# Patient Record
Sex: FEMALE | Race: BLACK OR AFRICAN AMERICAN | Employment: PART TIME | URBAN - METROPOLITAN AREA
[De-identification: names, ages, dates, MRNs, and addresses within clinical notes are randomized per-mention and may not be internally consistent; named-entity substitution may affect disease eponyms.]

---

## 2017-01-11 ENCOUNTER — HOSPITAL ENCOUNTER (OUTPATIENT)
Dept: SLEEP MEDICINE | Age: 62
Discharge: HOME | End: 2017-01-11
Payer: MEDICARE

## 2017-01-11 PROCEDURE — 95810 POLYSOM 6/> YRS 4/> PARAM: CPT

## 2017-05-15 ENCOUNTER — HOSPITAL ENCOUNTER (OUTPATIENT)
Dept: LAB | Age: 62
Discharge: HOME | End: 2017-05-15
Attending: INTERNAL MEDICINE
Payer: MEDICARE

## 2017-05-15 DIAGNOSIS — I10 ESSENTIAL HYPERTENSION: ICD-10-CM

## 2017-05-15 DIAGNOSIS — E78.5 DYSLIPIDEMIA: ICD-10-CM

## 2017-05-15 LAB
ALBUMIN SERPL BCP-MCNC: 3.4 G/DL (ref 3.5–5)
ALBUMIN/GLOB SERPL: 0.9 {RATIO}
ALP SERPL-CCNC: 98 U/L (ref 50–136)
ALT SERPL-CCNC: 20 U/L (ref 12–65)
ANION GAP BLD CALC-SCNC: 5 MMOL/L
AST SERPL W P-5'-P-CCNC: 18 U/L (ref 15–37)
BILIRUB DIRECT SERPL-MCNC: 0.1 MG/DL
BILIRUB SERPL-MCNC: 0.4 MG/DL (ref 0.2–1.1)
BUN SERPL-MCNC: 13 MG/DL (ref 6–23)
CALCIUM SERPL-MCNC: 8.8 MG/DL (ref 8.3–10.4)
CHLORIDE SERPL-SCNC: 103 MMOL/L (ref 98–107)
CHOLEST SERPL-MCNC: 190 MG/DL
CO2 SERPL-SCNC: 34 MMOL/L (ref 21–32)
CREAT SERPL-MCNC: 1 MG/DL (ref 0.6–1)
GLOBULIN SER CALC-MCNC: 3.9 G/DL
GLUCOSE SERPL-MCNC: 113 MG/DL (ref 65–100)
HDLC SERPL-MCNC: 71 MG/DL (ref 40–60)
HDLC SERPL: 2.7 {RATIO}
LDLC SERPL CALC-MCNC: 97 MG/DL
LIPID PROFILE,FLP: ABNORMAL
POTASSIUM SERPL-SCNC: 3.2 MMOL/L (ref 3.5–5.1)
PROT SERPL-MCNC: 7.3 G/DL (ref 6.3–8.2)
SODIUM SERPL-SCNC: 142 MMOL/L (ref 136–145)
TRIGL SERPL-MCNC: 110 MG/DL (ref 35–150)
VLDLC SERPL CALC-MCNC: 22 MG/DL (ref 6–23)

## 2017-05-15 PROCEDURE — 80076 HEPATIC FUNCTION PANEL: CPT | Performed by: INTERNAL MEDICINE

## 2017-05-15 PROCEDURE — 36415 COLL VENOUS BLD VENIPUNCTURE: CPT | Performed by: INTERNAL MEDICINE

## 2017-05-15 PROCEDURE — 80048 BASIC METABOLIC PNL TOTAL CA: CPT | Performed by: INTERNAL MEDICINE

## 2017-05-15 PROCEDURE — 80061 LIPID PANEL: CPT | Performed by: INTERNAL MEDICINE

## 2017-10-31 PROBLEM — E11.9 TYPE 2 DIABETES MELLITUS (HCC): Status: ACTIVE | Noted: 2017-10-31

## 2017-10-31 PROBLEM — F34.1 DYSTHYMIA: Status: ACTIVE | Noted: 2017-10-31

## 2017-11-27 PROBLEM — R06.02 SHORTNESS OF BREATH: Status: ACTIVE | Noted: 2017-11-27

## 2018-01-19 PROBLEM — E66.01 OBESITY, MORBID (HCC): Status: ACTIVE | Noted: 2018-01-19

## 2018-01-19 PROBLEM — R06.02 SHORTNESS OF BREATH: Status: RESOLVED | Noted: 2017-11-27 | Resolved: 2018-01-19

## 2018-02-13 ENCOUNTER — ANESTHESIA EVENT (OUTPATIENT)
Dept: ENDOSCOPY | Age: 63
End: 2018-02-13
Payer: MEDICARE

## 2018-02-13 RX ORDER — SODIUM CHLORIDE 0.9 % (FLUSH) 0.9 %
5-10 SYRINGE (ML) INJECTION AS NEEDED
Status: CANCELLED | OUTPATIENT
Start: 2018-02-13

## 2018-02-13 RX ORDER — SODIUM CHLORIDE, SODIUM LACTATE, POTASSIUM CHLORIDE, CALCIUM CHLORIDE 600; 310; 30; 20 MG/100ML; MG/100ML; MG/100ML; MG/100ML
100 INJECTION, SOLUTION INTRAVENOUS CONTINUOUS
Status: CANCELLED | OUTPATIENT
Start: 2018-02-13

## 2018-02-14 ENCOUNTER — ANESTHESIA (OUTPATIENT)
Dept: ENDOSCOPY | Age: 63
End: 2018-02-14
Payer: MEDICARE

## 2018-02-14 ENCOUNTER — HOSPITAL ENCOUNTER (OUTPATIENT)
Age: 63
Setting detail: OUTPATIENT SURGERY
Discharge: HOME | End: 2018-02-14
Attending: SURGERY | Admitting: SURGERY
Payer: MEDICARE

## 2018-02-14 VITALS
WEIGHT: 200 LBS | DIASTOLIC BLOOD PRESSURE: 74 MMHG | RESPIRATION RATE: 16 BRPM | TEMPERATURE: 96.8 F | SYSTOLIC BLOOD PRESSURE: 127 MMHG | BODY MASS INDEX: 34.15 KG/M2 | OXYGEN SATURATION: 100 % | HEART RATE: 63 BPM | HEIGHT: 64 IN

## 2018-02-14 LAB — GLUCOSE BLD STRIP.AUTO-MCNC: 97 MG/DL (ref 65–100)

## 2018-02-14 PROCEDURE — 76040000025: Performed by: SURGERY

## 2018-02-14 PROCEDURE — 74011250636 HC RX REV CODE- 250/636: Performed by: ANESTHESIOLOGY

## 2018-02-14 PROCEDURE — 74011250636 HC RX REV CODE- 250/636

## 2018-02-14 PROCEDURE — 74011250637 HC RX REV CODE- 250/637: Performed by: ANESTHESIOLOGY

## 2018-02-14 PROCEDURE — 82962 GLUCOSE BLOOD TEST: CPT

## 2018-02-14 PROCEDURE — 76060000032 HC ANESTHESIA 0.5 TO 1 HR: Performed by: SURGERY

## 2018-02-14 RX ORDER — FAMOTIDINE 20 MG/1
20 TABLET, FILM COATED ORAL AS NEEDED
Status: COMPLETED | OUTPATIENT
Start: 2018-02-14 | End: 2018-02-14

## 2018-02-14 RX ORDER — PROPOFOL 10 MG/ML
INJECTION, EMULSION INTRAVENOUS AS NEEDED
Status: DISCONTINUED | OUTPATIENT
Start: 2018-02-14 | End: 2018-02-14 | Stop reason: HOSPADM

## 2018-02-14 RX ORDER — PROPOFOL 10 MG/ML
INJECTION, EMULSION INTRAVENOUS
Status: DISCONTINUED | OUTPATIENT
Start: 2018-02-14 | End: 2018-02-14 | Stop reason: HOSPADM

## 2018-02-14 RX ORDER — DEXTROMETHORPHAN/PSEUDOEPHED 2.5-7.5/.8
40 DROPS ORAL
Status: DISCONTINUED | OUTPATIENT
Start: 2018-02-14 | End: 2018-02-14 | Stop reason: HOSPADM

## 2018-02-14 RX ORDER — SODIUM CHLORIDE, SODIUM LACTATE, POTASSIUM CHLORIDE, CALCIUM CHLORIDE 600; 310; 30; 20 MG/100ML; MG/100ML; MG/100ML; MG/100ML
100 INJECTION, SOLUTION INTRAVENOUS CONTINUOUS
Status: DISCONTINUED | OUTPATIENT
Start: 2018-02-14 | End: 2018-02-14 | Stop reason: HOSPADM

## 2018-02-14 RX ADMIN — SODIUM CHLORIDE, SODIUM LACTATE, POTASSIUM CHLORIDE, AND CALCIUM CHLORIDE 100 ML/HR: 600; 310; 30; 20 INJECTION, SOLUTION INTRAVENOUS at 07:45

## 2018-02-14 RX ADMIN — PROPOFOL 30 MG: 10 INJECTION, EMULSION INTRAVENOUS at 08:08

## 2018-02-14 RX ADMIN — FAMOTIDINE 20 MG: 20 TABLET, FILM COATED ORAL at 07:44

## 2018-02-14 RX ADMIN — PROPOFOL 300 MCG/KG/MIN: 10 INJECTION, EMULSION INTRAVENOUS at 08:08

## 2018-02-14 NOTE — ANESTHESIA POSTPROCEDURE EVALUATION
Post-Anesthesia Evaluation and Assessment    Patient: Alecia Gillespie MRN: [id number]  SSN: [id number]    YOB: 1955  Age: 63 y.o. Sex: female       Cardiovascular Function/Vital Signs  Visit Vitals    /62    Pulse 66    Temp 36 °C (96.8 °F)    Resp 16    Ht 5' 4\" (1.626 m)    Wt 90.7 kg (200 lb)    SpO2 98%    BMI 34.33 kg/m2       Patient is status post total IV anesthesia anesthesia for Procedure(s):  COLONOSCOPY/ 41. Nausea/Vomiting: None    Postoperative hydration reviewed and adequate. Pain:  Pain Scale 1: Numeric (0 - 10) (02/14/18 0850)  Pain Intensity 1: 0 (02/14/18 0850)   Managed    Neurological Status: At baseline    Mental Status and Level of Consciousness: Arousable    Pulmonary Status:   O2 Device: Room air (02/14/18 0850)   Adequate oxygenation and airway patent    Complications related to anesthesia: None    Post-anesthesia assessment completed.  No concerns    Signed By: Ada Suarez MD     February 14, 2018

## 2018-02-14 NOTE — PROGRESS NOTES
Discharge instructions and discharge med list given to pt's daughter Geno Burgess, voiced understanding.

## 2018-02-14 NOTE — ANESTHESIA PREPROCEDURE EVALUATION
Anesthetic History   No history of anesthetic complications            Review of Systems / Medical History  Patient summary reviewed, nursing notes reviewed and pertinent labs reviewed    Pulmonary    COPD: mild    Sleep apnea: No treatment  Shortness of breath and smoker (quit 22 years)         Neuro/Psych       CVA (had some left sided weakness, resolved)       Cardiovascular    Hypertension: well controlled          Past MI (2003, no intervention, no angina since.) and CAD    Exercise tolerance: <4 METS     GI/Hepatic/Renal     GERD: well controlled           Endo/Other    Diabetes: well controlled    Morbid obesity and arthritis     Other Findings              Physical Exam    Airway  Mallampati: II  TM Distance: > 6 cm  Neck ROM: normal range of motion   Mouth opening: Diminished (comment)     Cardiovascular  Regular rate and rhythm,  S1 and S2 normal,  no murmur, click, rub, or gallop  Rhythm: regular  Rate: normal         Dental    Dentition: Full lower dentures, Full upper dentures and Edentulous     Pulmonary  Breath sounds clear to auscultation               Abdominal         Other Findings            Anesthetic Plan    ASA: 3  Anesthesia type: total IV anesthesia          Induction: Intravenous  Anesthetic plan and risks discussed with: Patient

## 2018-02-14 NOTE — DISCHARGE INSTRUCTIONS
Gastrointestinal Colonoscopy/Flexible Sigmoidoscopy - Lower Exam Discharge Instructions  1. Call Dr. Jamila Alex at [phone] for any problems or questions. 2. Contact the doctors office for follow up appointment as directed  3. Medication may cause drowsiness for several hours, therefore, do not drive or operate machinery for remainder of the day. 4. No alcohol today. 5. Ordinarily, you may resume regular diet and activity after exam unless otherwise specified by your physician. 6. Because of air put into your colon during exam, you may experience some abdominal distension, relieved by the passage of gas, for several hours. 7. Contact your physician if you have any of the following:  a. Excessive amount of bleeding - large amount when having a bowel movement. Occasional specks of blood with bowel movement would not be unusual.  b. Severe abdominal pain  c. Fever or Chills  Any additional instructions:    Repeat Colonoscopy in 5 years      Instructions given to Ericka Nasir and other family members.   Instructions given by:

## 2018-02-14 NOTE — IP AVS SNAPSHOT
[address] 
[phone] Patient: Vance Zavala MRN: [id number] KBH:4[birth year] About your hospitalization You were admitted on:  February 14, 2018 You last received care in the:  [facility] You were discharged on:  February 14, 2018 Why you were hospitalized Your primary diagnosis was:  Not on File Follow-up Information None Your Scheduled Appointments Thursday February 22, 2018  8:00 AM EST  
SHORT with MD Kenny Lara [address] Kenny [address]) [facility] [address]  
[phone] Discharge Orders None A check radha indicates which time of day the medication should be taken. My Medications ASK your doctor about these medications Instructions Each Dose to Equal  
 Morning Noon Evening Bedtime  
 amLODIPine 10 mg tablet Commonly known as:  Rulon Naheed Your last dose was: Your next dose is: Take 1 Tab by mouth daily. 10 mg  
    
   
   
   
  
 atenolol 100 mg tablet Commonly known as:  TENORMIN Your last dose was: Your next dose is: Take 1 Tab by mouth two (2) times a day. 100 mg  
    
   
   
   
  
 cyclobenzaprine 10 mg tablet Commonly known as:  FLEXERIL Your last dose was: Your next dose is: Take 10 mg by mouth daily. 10 mg FLUoxetine 10 mg capsule Commonly known as:  PROzac Your last dose was: Your next dose is: Take 10 mg by mouth daily as needed. In the morning 10 mg  
    
   
   
   
  
 hydroCHLOROthiazide 25 mg tablet Commonly known as:  HYDRODIURIL Your last dose was: Your next dose is: Take 25 mg by mouth daily. 25 mg  
    
   
   
   
  
 losartan-hydroCHLOROthiazide 100-25 mg per tablet Commonly known as:  HYZAAR Your last dose was: Your next dose is: Take 1 Tab by mouth daily. 1 Tab  
    
   
   
   
  
 meloxicam 7.5 mg tablet Commonly known as:  MOBIC Your last dose was: Your next dose is: Take 1 Tab by mouth daily. 7.5 mg  
    
   
   
   
  
 metFORMIN 500 mg Tg24 24 hour tablet Commonly known asJunette Leak ER Your last dose was: Your next dose is: Take 1,000 mg by mouth daily. 2 Tab  
    
   
   
   
  
 minoxidil 10 mg tablet Commonly known as:  Gennaro Bradener Your last dose was: Your next dose is: Take 1 Tab by mouth daily. 10 mg Discharge Instructions Gastrointestinal Colonoscopy/Flexible Sigmoidoscopy - Lower Exam Discharge Instructions 1. Call Dr. Mary Reeves at [phone] for any problems or questions. 2. Contact the doctors office for follow up appointment as directed 3. Medication may cause drowsiness for several hours, therefore, do not drive or operate machinery for remainder of the day. 4. No alcohol today. 5. Ordinarily, you may resume regular diet and activity after exam unless otherwise specified by your physician. 6. Because of air put into your colon during exam, you may experience some abdominal distension, relieved by the passage of gas, for several hours. 7. Contact your physician if you have any of the following: 
a. Excessive amount of bleeding  large amount when having a bowel movement. Occasional specks of blood with bowel movement would not be unusual. 
b. Severe abdominal pain 
c. Fever or Chills Any additional instructions:   
Repeat Colonoscopy in 5 years Instructions given to Roverto Haynes and other family members. Instructions given by: Introducing [facility]!    
 Padmini Del Castillo introduces [employer] patient portal. Now you can access parts of your medical record, email your doctor's office, and request medication refills online. 1. In your internet browser, go to [email or web address] 2. Click on the First Time User? Click Here link in the Sign In box. You will see the New Member Sign Up page. 3. Enter your BA[employer]AT Access Code exactly as it appears below. You will not need to use this code after youve completed the sign-up process. If you do not sign up before the expiration date, you must request a new code. · BA[employer]AT Access Code: [email or web address] Expires: 2/25/2018  8:51 AM 
 
4. Enter the last four digits of your Social Security Number (xxxx) and Date of Birth (mm/dd/yyyy) as indicated and click Submit. You will be taken to the next sign-up page. 5. Create a BAUNAT ID. This will be your BAUNAT login ID and cannot be changed, so think of one that is secure and easy to remember. 6. Create a BAUNAT password. You can change your password at any time. 7. Enter your Password Reset Question and Answer. This can be used at a later time if you forget your password. 8. Enter your e-mail address. You will receive e-mail notification when new information is available in [address] 9. Click Sign Up. You can now view and download portions of your medical record. 10. Click the Download Summary menu link to download a portable copy of your medical information. If you have questions, please visit the Frequently Asked Questions section of the BA[employer]AT website. Remember, BAUNAT is NOT to be used for urgent needs. For medical emergencies, dial 911. Now available from your iPhone and Android! Providers Seen During Your Hospitalization Provider Specialty Primary office phone Moe Chin, [address] [place] [address] [phone] Your Primary Care Physician (PCP) Primary Care Physician Office Phone Office Fax Georgi Parry [phone] You are allergic to the following Allergen Reactions Codeine Swelling Severe Facial swelling Recent Documentation Height Weight BMI Smoking Status 1.626 m 90.7 kg 34.33 kg/m2 Former Smoker Emergency Contacts Name Discharge Info Relation Home Work Mobile Fer Renner  Spouse [7]   [phone] Patient Belongings The following personal items are in your possession at time of discharge: 
  Dental Appliances: Uppers  Visual Aid: Glasses Please provide this summary of care documentation to your next provider. Signatures-by signing, you are acknowledging that this After Visit Summary has been reviewed with you and you have received a copy. Patient Signature:  ____________________________________________________________ Date:  ____________________________________________________________  
  
[facility] Provider Signature:  ____________________________________________________________ Date:  ____________________________________________________________

## 2018-02-14 NOTE — H&P (VIEW-ONLY)
Name: Pia Garner      MRN: [id number]       : 1955       Age: 63 y.o. Sex: female        Dalia Brunner MD       CC:    Chief Complaint   Patient presents with    New Patient     colonoscopy       HPI:  The patient is referred here for a colonoscopy by Dr. Dalia Brunner, her PCP at [facility]. She has a family history of colon cancer with a brother who was diagnosed at age 43. She has had some occasional BRBPR on the toilet tissue as well as in the water of the commode. Family hx of colon cancer YES      Previous colonoscopy YES   BRBPR YES   Melena NO   Loss of appetite NO   Weight loss NO      Change in bowel habits NO       HISTORY:    Past Medical History:   Diagnosis Date    [facility]     Depression     Diabetes [facility]     Dyslipidemia     GERD (gastroesophageal reflux disease)     Glaucoma     Heart attack     Hypercholesterolemia     Hypertension     Morbid obesity ([facility]     OA (osteoarthritis)     Sleep apnea     Stroke [facility])      Past Surgical History:   Procedure Laterality Date    HX COLONOSCOPY      HX CYST INCISION AND DRAINAGE      HX HYSTERECTOMY      TOTAL     Prior to Admission medications    Medication Sig Start Date End Date Taking? Authorizing Provider   atenolol (TENORMIN) 100 mg tablet Take 1 Tab by mouth two (2) times a day. 18   Dalia Brunner MD   [facility]) 500 mg TG24 24 hour tablet Take 1,000 mg by mouth daily. 18   Dalia Brunner MD   amLODIPine (NORVASC) 10 mg tablet Take 1 Tab by mouth daily. 18   Dalia Brunner MD   losartan-hydroCHLOROthiazide (HYZAAR) 100-25 mg per tablet Take 1 Tab by mouth daily. 18   Dalia Brunner MD   meloxicam (MOBIC) 7.5 mg tablet Take 1 Tab by mouth daily. 18   Dalia Brunner MD   hydroCHLOROthiazide (HYDRODIURIL) 25 mg tablet Take 25 mg by mouth daily. Historical Provider   minoxidil (LONITEN) 10 mg tablet Take 1 Tab by mouth daily.  8/15/17   Vandana Portillo MD Teja   cyclobenzaprine (FLEXERIL) 10 mg tablet Take 10 mg by mouth daily. Historical Provider   FLUoxetine (PROZAC) 10 mg capsule Take 10 mg by mouth daily as needed. In the morning       Historical Provider     Social History   Substance Use Topics    Smoking status: Former Smoker     Quit date: 11/14/1999    Smokeless tobacco: Never Used    Alcohol use No     Family History   Problem Relation Age of Onset    Sudden Death Sister 43     MI     . Allergies   Allergen Reactions    Codeine Swelling     Face         Physical Exam:     Visit Vitals    /71    Pulse 64    Ht 5' 3\" (1.6 m)    Wt 222 lb (100.7 kg)    BMI 39.33 kg/m2       General: Alert, oriented, cooperative black female in no acute distress. Resp: Breathing is  non-labored. Lungs clear to auscultation without wheezing or rhonchi   CV: RRR. No murmurs, rubs or gallops appreciated. Abd: soft non-tender and non-distended without peritoneal signs. +bs    Extremities: No clubbing, cyanosis or edema. Strength intact. Assessment/Plan:  Emil Abdullahi is a 63 y.o. female who is here for a colonoscopy due to recent BRBPR plus a family history. 1. Off ASA for one week, if on aspirin    2. Bowel prep. Two day prep as her prep has been poor in the past.    3. Colonoscopy with MAC. I went through the risks of bleeding, perforation of the colon and cardiopulmonary problems that can develop with the use of anesthesia.     Marzena Leos MD     FACS   1/30/2018  3:20 PM

## 2018-02-14 NOTE — OP NOTES
[facility] REPORT    Ethan Gerber  MR#: [id number]  : 1955  ACCOUNT #: [de-identified]   DATE OF SERVICE: 2018    PREOPERATIVE DIAGNOSES:  Bright red blood per rectum plus family history of colon cancer. POSTOPERATIVE DIAGNOSES:  1. Pan diverticular disease. 2.  Grade II internal hemorrhoids. 3.  No masses, polyps or bleeding noted. PROCEDURE PERFORMED:  Colonoscopy. SURGEON:  Miah Grubbs MD.     ASSISTANT:  None     ANESTHESIA:  Monitored anesthesia care with Dr. Roshni Roe and Benny Portillo.    ESTIMATED BLOOD LOSS:  None. SPECIMENS REMOVED:  None. IMPLANTS:  None. COMPLICATIONS:  None. HISTORY OF PRESENT ILLNESS:  This is a 82-year-old female who had a brother who had colon cancer and had surgery at age 43. She has had some occasional bright red blood per rectum with blood on the toilet tissue. She was due for a colonoscopy and was referred by Dr. Tammy Dunham at [facility]. I saw the patient in the office and went through the procedure, risks of bleeding, infection, anesthesia, perforation of the colon. Patient was agreeable and signed a consent form and was scheduled for today. Patient underwent a bowel prep on 2018 and came to [facility] endoscopy center where she was seen in the preoperative area. She was transported to room 4 at [facility] endoscopy center where she was placed in the stretcher in left lateral decubitus position. Oxygen per nasal cannula was administered. Dr. Roshni Roe and Viktoria Curry performed monitored anesthesia care. Please see their records for the vital signs and amount of medication given. We did a timeout identifying the patient, the surgeon procedure and her birthdate of 1955. When everyone agreed with the time-out, monitored anesthesia care was initiated.   Once the sedative effects had taken hold, an adult colonoscope was advanced through the anus and all the way to the cecum. The bowel prep was good. The cecum was identified, the ileocecal valve, cecal folds, external compression right lower quadrant corresponded to an indentation seen with the scope. The patient had diverticula noted throughout the colon, less numerous in the right side of the colon, more numerous in the descending and sigmoid, but seen throughout. There were no masses noted or polyps noted in the cecum, ascending colon, transverse colon, descending colon and sigmoid colon. There was just pandiverticular disease. No evidence of bleeding was noted. Scope was brought back to the rectum and was retroflexed. She had some grade II internal hemorrhoids. This is likely the source of her intermittent blood on the toilet tissue. The scope was withdrawn. Digital rectal exam revealed good sphincter tone. FINDINGS:  1. Pan diverticular disease. No evidence of acute diverticulitis or bleeding. 2.  Grade II internal hemorrhoids. 3.  No masses, polyps or bleeding noted. PLAN:  Repeat the colonoscopy in 5 years.       MD EVENS Hutchinson / EMMA  D: 02/14/2018 08:41     T: 02/14/2018 09:30  JOB #: [id number]  CC: Marco Freitas MD

## 2018-02-14 NOTE — INTERVAL H&P NOTE
H&P Update:  Alicia Graf was seen and examined. History and physical has been reviewed. The patient has been examined.  There have been no significant clinical changes since the completion of the originally dated History and Physical.    Signed By: Azeb Centeno MD     February 14, 2018 7:08 AM

## 2018-07-09 PROBLEM — E66.01 SEVERE OBESITY (BMI 35.0-39.9): Status: ACTIVE | Noted: 2018-07-09

## 2018-11-07 PROBLEM — F32.A MILD DEPRESSION: Status: ACTIVE | Noted: 2018-11-07

## 2019-01-05 ENCOUNTER — HOSPITAL ENCOUNTER (OUTPATIENT)
Dept: MAMMOGRAPHY | Age: 64
Discharge: HOME | End: 2019-01-05
Attending: INTERNAL MEDICINE
Payer: MEDICARE

## 2019-01-05 DIAGNOSIS — Z12.31 SCREENING MAMMOGRAM, ENCOUNTER FOR: ICD-10-CM

## 2019-01-05 PROCEDURE — 77067 SCR MAMMO BI INCL CAD: CPT

## 2019-01-15 ENCOUNTER — HOSPITAL ENCOUNTER (OUTPATIENT)
Dept: ULTRASOUND IMAGING | Age: 64
Discharge: HOME | End: 2019-01-15
Attending: PODIATRIST
Payer: MEDICARE

## 2019-01-15 DIAGNOSIS — R20.2 PARESTHESIA: ICD-10-CM

## 2019-01-15 DIAGNOSIS — Z86.73 HISTORY OF CVA (CEREBROVASCULAR ACCIDENT): ICD-10-CM

## 2019-01-15 DIAGNOSIS — M20.12 VALGUS DEFORMITY OF BOTH GREAT TOES: ICD-10-CM

## 2019-01-15 DIAGNOSIS — M20.11 VALGUS DEFORMITY OF BOTH GREAT TOES: ICD-10-CM

## 2019-01-15 DIAGNOSIS — E11.8 TYPE 2 DIABETES MELLITUS WITH COMPLICATION, WITHOUT LONG-TERM CURRENT USE OF INSULIN (HCC): ICD-10-CM

## 2019-01-15 PROCEDURE — 93922 UPR/L XTREMITY ART 2 LEVELS: CPT

## 2019-02-04 PROBLEM — M79.605 PAIN OF LEFT LOWER EXTREMITY: Status: ACTIVE | Noted: 2019-02-04

## 2019-03-15 ENCOUNTER — APPOINTMENT (OUTPATIENT)
Dept: CT IMAGING | Age: 64
End: 2019-03-15
Attending: EMERGENCY MEDICINE
Payer: COMMERCIAL

## 2019-03-15 ENCOUNTER — HOSPITAL ENCOUNTER (EMERGENCY)
Age: 64
Discharge: HOME | End: 2019-03-15
Attending: EMERGENCY MEDICINE
Payer: COMMERCIAL

## 2019-03-15 VITALS
HEART RATE: 76 BPM | OXYGEN SATURATION: 99 % | TEMPERATURE: 98 F | RESPIRATION RATE: 20 BRPM | SYSTOLIC BLOOD PRESSURE: 142 MMHG | DIASTOLIC BLOOD PRESSURE: 60 MMHG

## 2019-03-15 DIAGNOSIS — I10 ESSENTIAL HYPERTENSION: ICD-10-CM

## 2019-03-15 DIAGNOSIS — K57.32 DIVERTICULITIS OF LARGE INTESTINE WITHOUT COMPLICATION: Primary | ICD-10-CM

## 2019-03-15 LAB
ANION GAP SERPL CALC-SCNC: 6 MMOL/L
BUN SERPL-MCNC: 18 MG/DL (ref 8–23)
CALCIUM SERPL-MCNC: 9 MG/DL (ref 8.3–10.4)
CHLORIDE SERPL-SCNC: 104 MMOL/L (ref 98–107)
CO2 SERPL-SCNC: 28 MMOL/L (ref 21–32)
CREAT SERPL-MCNC: 1.26 MG/DL (ref 0.6–1)
ERYTHROCYTE [DISTWIDTH] IN BLOOD BY AUTOMATED COUNT: 13.4 % (ref 11.9–14.6)
GLUCOSE SERPL-MCNC: 119 MG/DL (ref 65–100)
HCT VFR BLD AUTO: 38 % (ref 35.8–46.3)
HGB BLD-MCNC: 12.1 G/DL (ref 11.7–15.4)
MCH RBC QN AUTO: 27 PG (ref 26.1–32.9)
MCHC RBC AUTO-ENTMCNC: 31.8 G/DL (ref 31.4–35)
MCV RBC AUTO: 84.8 FL (ref 79.6–97.8)
NRBC # BLD: 0 K/UL (ref 0–0.2)
PLATELET # BLD AUTO: 193 K/UL (ref 150–450)
PMV BLD AUTO: 10.6 FL (ref 9.4–12.3)
POTASSIUM SERPL-SCNC: 3.4 MMOL/L (ref 3.5–5.1)
RBC # BLD AUTO: 4.48 M/UL (ref 4.05–5.2)
SODIUM SERPL-SCNC: 138 MMOL/L (ref 136–145)
WBC # BLD AUTO: 4 K/UL (ref 4.3–11.1)

## 2019-03-15 PROCEDURE — 85027 COMPLETE CBC AUTOMATED: CPT

## 2019-03-15 PROCEDURE — 74176 CT ABD & PELVIS W/O CONTRAST: CPT

## 2019-03-15 PROCEDURE — 74011250636 HC RX REV CODE- 250/636: Performed by: EMERGENCY MEDICINE

## 2019-03-15 PROCEDURE — 80048 BASIC METABOLIC PNL TOTAL CA: CPT

## 2019-03-15 PROCEDURE — 74011250637 HC RX REV CODE- 250/637: Performed by: EMERGENCY MEDICINE

## 2019-03-15 PROCEDURE — 96375 TX/PRO/DX INJ NEW DRUG ADDON: CPT | Performed by: EMERGENCY MEDICINE

## 2019-03-15 PROCEDURE — 99284 EMERGENCY DEPT VISIT MOD MDM: CPT | Performed by: EMERGENCY MEDICINE

## 2019-03-15 PROCEDURE — 96374 THER/PROPH/DIAG INJ IV PUSH: CPT | Performed by: EMERGENCY MEDICINE

## 2019-03-15 RX ORDER — CIPROFLOXACIN 500 MG/1
500 TABLET ORAL 2 TIMES DAILY
Qty: 20 TAB | Refills: 0 | Status: SHIPPED | OUTPATIENT
Start: 2019-03-15 | End: 2019-03-25

## 2019-03-15 RX ORDER — SODIUM CHLORIDE 0.9 % (FLUSH) 0.9 %
5-40 SYRINGE (ML) INJECTION EVERY 8 HOURS
Status: DISCONTINUED | OUTPATIENT
Start: 2019-03-15 | End: 2019-03-15 | Stop reason: HOSPADM

## 2019-03-15 RX ORDER — ONDANSETRON 8 MG/1
8 TABLET, ORALLY DISINTEGRATING ORAL
Qty: 12 TAB | Refills: 0 | Status: SHIPPED | OUTPATIENT
Start: 2019-03-15 | End: 2019-05-02

## 2019-03-15 RX ORDER — HYDROMORPHONE HYDROCHLORIDE 2 MG/ML
1 INJECTION, SOLUTION INTRAMUSCULAR; INTRAVENOUS; SUBCUTANEOUS
Status: COMPLETED | OUTPATIENT
Start: 2019-03-15 | End: 2019-03-15

## 2019-03-15 RX ORDER — CIPROFLOXACIN 500 MG/1
500 TABLET ORAL ONCE
Status: COMPLETED | OUTPATIENT
Start: 2019-03-15 | End: 2019-03-15

## 2019-03-15 RX ORDER — SODIUM CHLORIDE 0.9 % (FLUSH) 0.9 %
5-40 SYRINGE (ML) INJECTION AS NEEDED
Status: DISCONTINUED | OUTPATIENT
Start: 2019-03-15 | End: 2019-03-15 | Stop reason: HOSPADM

## 2019-03-15 RX ORDER — METRONIDAZOLE 500 MG/1
500 TABLET ORAL
Status: COMPLETED | OUTPATIENT
Start: 2019-03-15 | End: 2019-03-15

## 2019-03-15 RX ORDER — MORPHINE SULFATE 15 MG/1
7.5 TABLET ORAL
Qty: 6 TAB | Refills: 0 | Status: SHIPPED | OUTPATIENT
Start: 2019-03-15 | End: 2019-03-18

## 2019-03-15 RX ORDER — KETOROLAC TROMETHAMINE 30 MG/ML
15 INJECTION, SOLUTION INTRAMUSCULAR; INTRAVENOUS
Status: COMPLETED | OUTPATIENT
Start: 2019-03-15 | End: 2019-03-15

## 2019-03-15 RX ORDER — METRONIDAZOLE 500 MG/1
500 TABLET ORAL 3 TIMES DAILY
Qty: 30 TAB | Refills: 0 | Status: SHIPPED | OUTPATIENT
Start: 2019-03-15 | End: 2019-03-25

## 2019-03-15 RX ORDER — ONDANSETRON 2 MG/ML
4 INJECTION INTRAMUSCULAR; INTRAVENOUS
Status: COMPLETED | OUTPATIENT
Start: 2019-03-15 | End: 2019-03-15

## 2019-03-15 RX ADMIN — ONDANSETRON 4 MG: 2 INJECTION INTRAMUSCULAR; INTRAVENOUS at 02:17

## 2019-03-15 RX ADMIN — CIPROFLOXACIN HYDROCHLORIDE 500 MG: 500 TABLET, FILM COATED ORAL at 03:34

## 2019-03-15 RX ADMIN — HYDROMORPHONE HYDROCHLORIDE 1 MG: 2 INJECTION, SOLUTION INTRAMUSCULAR; INTRAVENOUS; SUBCUTANEOUS at 02:18

## 2019-03-15 RX ADMIN — METRONIDAZOLE 500 MG: 500 TABLET ORAL at 03:34

## 2019-03-15 RX ADMIN — KETOROLAC TROMETHAMINE 15 MG: 30 INJECTION, SOLUTION INTRAMUSCULAR; INTRAVENOUS at 02:18

## 2019-03-15 NOTE — ED NOTES
I have reviewed discharge instructions with the patient. The patient verbalized understanding. Patient left ED via Discharge Method: ambulatory to Home with (family). Opportunity for questions and clarification provided. Patient given 1 scripts. To continue your aftercare when you leave the hospital, you may receive an automated call from our care team to check in on how you are doing. This is a free service and part of our promise to provide the best care and service to meet your aftercare needs.  If you have questions, or wish to unsubscribe from this service please call [phone]. Thank you for Choosing our [facility] [employer] [facility] Department.

## 2019-03-15 NOTE — ED PROVIDER NOTES
Left flank pain onset yesterday. Worsening throughout the day. Some nausea but no vomiting. Patient denies fever. Has history of hypertension and reports compliance with medication. Blood pressure extremely elevated. The history is provided by the patient. Flank Pain    This is a new problem. The current episode started yesterday. The problem has been gradually worsening. Episode frequency: intermittent yesterday but more constant and gradually worsening today. The pain is associated with no known injury. The pain is present in the left side. The quality of the pain is described as stabbing. The pain radiates to the left groin. The pain is moderate (moderate constant pain with occasional severe exacerbations). Associated symptoms include abdominal pain. Pertinent negatives include no fever, no numbness, no dysuria, no leg pain, no paresthesias, no paresis, no tingling and no weakness. She has tried nothing for the symptoms.         Past Medical History:   Diagnosis Date    [facility] ([facility]     Dyslipidemia     OA (osteoarthritis)     Sleep apnea     pt denies    Stroke ([facility] [address] [facility]     2007- slight limp on LLE-  no use of asst device       Past Surgical History:   Procedure Laterality Date    COLONOSCOPY N/A 2/14/2018    COLONOSCOPY/ 39 performed by Benjamin Hopper MD at [facility] ENDOSCOPY    HX COLONOSCOPY      HX CYST INCISION AND DRAINAGE  1975    HX HYSTERECTOMY  1989    TOTAL         Family History:   Problem Relation Age of Onset    Sudden Death Sister 43        MI    Stroke Mother 78    Heart Disease Mother 77        \"silent heart attack\"    Hypertension Mother     Diabetes Father     Hypertension Brother     Hypertension Sister     Hypertension Brother        Social History     Socioeconomic History    Marital status:      Spouse name: Not on file    Number of children: Not on file    Years of education: Not on file    Highest education level: Not on file   Social Needs    Financial resource strain: Not on file    Food insecurity - worry: Not on file    Food insecurity - inability: Not on file    Transportation needs - medical: Not on file   Bloominous needs - non-medical: Not on file   Occupational History    Not on file   Tobacco Use    Smoking status: Former Smoker     Last attempt to quit: 1999     Years since quittin.3    Smokeless tobacco: Never Used   Substance and Sexual Activity    Alcohol use: No    Drug use: No    Sexual activity: Not Currently   Other Topics Concern    Not on file   Social History Narrative    Not on file         ALLERGIES: Codeine    Review of Systems   Constitutional: Negative for fever. Gastrointestinal: Positive for abdominal pain. Genitourinary: Positive for flank pain. Negative for dysuria. Neurological: Negative for tingling, weakness, numbness and paresthesias. Vitals:    03/15/19 0153 03/15/19 0215   BP:  (!) 203/80   Temp: 97.7 °F (36.5 °C)    SpO2:  99%            Physical Exam   Constitutional: She appears well-developed and well-nourished. She appears distressed. HENT:   Mouth/Throat: Oropharynx is clear and moist.   Eyes: Conjunctivae are normal.   Cardiovascular: Normal rate, regular rhythm and normal heart sounds. Pulses:       Carotid pulses are 2+ on the right side, and 2+ on the left side. Radial pulses are 2+ on the right side, and 2+ on the left side. Femoral pulses are 2+ on the right side, and 2+ on the left side. Pulmonary/Chest: Effort normal and breath sounds normal.   Abdominal: Soft. She exhibits no distension and no mass. There is tenderness (occasional tenderness suprapubic and left lower abdomen). There is no guarding. No pulsatile abdominal mass        MDM  Number of Diagnoses or Management Options  Diagnosis management comments: Do not appreciate a pulsatile abdominal mass femoral pulses are equal bilaterally.   Limited tenderness in a lucid exam.  Suspect renal colic given lack of reproducibility on most palpations. Blood pressure extremely elevated. Will recheck after pain medication. 1:94 AM  Systolic blood pressure 297S after pain control. CT shows no ureteral stone but showed diverticulitis without abscess or perforation.        Amount and/or Complexity of Data Reviewed  Clinical lab tests: ordered and reviewed (Results for orders placed or performed during the hospital encounter of 03/15/19  -CBC W/O DIFF       Result                      Value             Ref Range           WBC                         4.0 (L)           4.3 - 11.1 K*       RBC                         4.48              4.05 - 5.2 M*       HGB                         12.1              11.7 - 15.4 *       HCT                         38.0              35.8 - 46.3 %       MCV                         84.8              79.6 - 97.8 *       MCH                         27.0              26.1 - 32.9 *       MCHC                        31.8              31.4 - 35.0 *       RDW                         13.4              11.9 - 14.6 %       PLATELET                    193               150 - 450 K/*       MPV                         10.6              9.4 - 12.3 FL       ABSOLUTE NRBC               0.00              0.0 - 0.2 K/*  -METABOLIC PANEL, BASIC       Result                      Value             Ref Range           Sodium                      138               136 - 145 mm*       Potassium                   3.4 (L)           3.5 - 5.1 mm*       Chloride                    104               98 - 107 mmo*       CO2                         28                21 - 32 mmol*       Anion gap                   6                 mmol/L              Glucose                     119 (H)           65 - 100 mg/*       BUN                         18                8 - 23 MG/DL        Creatinine                  1.26 (H)          0.6 - 1.0 MG*       GFR est AA                  56 (L)            >60 ml/min/1* GFR est non-AA              46                ml/min/1.73m2       Calcium                     9.0               8.3 - 10.4 M*  )  Tests in the radiology section of CPT®: ordered and reviewed (Ct Urogram Wo Cont    Result Date: 3/15/2019  CT ABDOMEN AND PELVIS WITHOUT CONTRAST HISTORY: Left lower quadrant pain COMPARISON: None TECHNIQUE: Helical imaging was performed from the lung bases through the ischial tuberosities without intravenous contrast. Oral contrast was not administered. Coronal and sagittal reformats were performed. Dose reduction techniques used: Automated exposure control, adjustment of the mAs and/or kVp according to patient's size, and iterative reconstruction techniques. FINDINGS: *  LUNG BASES: Within normal limits. *  LIVER: Within normal limits. *  GALLBLADDER AND BILE DUCTS: Normal. *  SPLEEN: Within normal limits. *  URINARY TRACT: Within normal limits. *  ADRENALS: Within normal limits. *  PANCREAS: Within normal limits. *  GASTROINTESTINAL TRACT: Mild sigmoid diverticulitis without evidence of abscess or free air. *  RETROPERITONEUM: Within normal limits. *  PERITONEAL CAVITY AND ABDOMINAL WALL: Within normal limits. *  PELVIS: Within normal limits. *  SPINE / BONES: Within normal limits. *  OTHER COMMENTS: None. IMPRESSION: Mild sigmoid diverticulitis without evidence of abscess or free air.   Evaluation of the abdominal viscera is limited without intravenous contrast. Date of Dictation: 3/15/2019 2:55 AM     )           Procedures

## 2019-03-15 NOTE — DISCHARGE INSTRUCTIONS
Patient Education   Cipro and Flagyl starting again later this morning. Cipro twice daily for 10 days and Flagyl 3 times daily for 10 days. Avoid alcohol while taking Flagyl. Clear liquid diet for 12-24 hours and advance as tolerated. Tylenol 501,000 mg every 6 hours for pain. Ibuprofen 400 mg every 6 hours for pain. Morphine one half tablet to one tablet every 6 hours if needed for breakthrough pain only. Morphine only for 2 perhaps 3 days at most for pain. Return if any new, worsening or concerning symptoms. Follow-up with your doctor in 3-5 days on improving. Diverticulitis: Care Instructions  Your Care Instructions    Diverticulitis occurs when pouches form in the wall of the colon and become inflamed or infected. It can be very painful. Doctors aren't sure what causes diverticulitis. There is no proof that foods such as nuts, seeds, or berries cause it or make it worse. A low-fiber diet may cause the colon to work harder to push stool forward. Pouches may form because of this extra work. It may be hard to think about healthy eating while you're in pain. But as you recover, you might think about how you can use healthy eating for overall better health. Healthy eating may help you avoid future attacks. Follow-up care is a key part of your treatment and safety. Be sure to make and go to all appointments, and call your doctor if you are having problems. It's also a good idea to know your test results and keep a list of the medicines you take. How can you care for yourself at home? · Drink plenty of fluids, enough so that your urine is light yellow or clear like water. If you have kidney, heart, or liver disease and have to limit fluids, talk with your doctor before you increase the amount of fluids you drink. · Stick to liquids or a bland diet (plain rice, bananas, dry toast or crackers, applesauce) until you are feeling better.  Then you can return to regular foods and gradually increase the amount of fiber in your diet. · Use a heating pad set on low on your belly to relieve mild cramps and pain. · Get extra rest until you are feeling better. · Be safe with medicines. Read and follow all instructions on the label. ? If the doctor gave you a prescription medicine for pain, take it as prescribed. ? If you are not taking a prescription pain medicine, ask your doctor if you can take an over-the-counter medicine. · If your doctor prescribed antibiotics, take them as directed. Do not stop taking them just because you feel better. You need to take the full course of antibiotics. To prevent future attacks of diverticulitis  · Avoid constipation:  ? Include fruits, vegetables, beans, and whole grains in your diet each day. These foods are high in fiber. ? Drink plenty of fluids, enough so that your urine is light yellow or clear like water. If you have kidney, heart, or liver disease and have to limit fluids, talk with your doctor before you increase the amount of fluids you drink. ? Get some exercise every day. Build up slowly to 30 to 60 minutes a day on 5 or more days of the week. ? Take a fiber supplement, such as Citrucel or Metamucil, every day if needed. Read and follow all instructions on the label. ? Schedule time each day for a bowel movement. Having a daily routine may help. Take your time and do not strain when having a bowel movement. When should you call for help? Call your doctor now or seek immediate medical care if:    · You have a fever.     · You are vomiting.     · You have new or worse belly pain.     · You cannot pass stools or gas.    Watch closely for changes in your health, and be sure to contact your doctor if you have any problems. Where can you learn more? Go to http://sharon-gem.info/. Enter H901 in the search box to learn more about \"Diverticulitis: Care Instructions. \"  Current as of: March 27, 2018  Content Version: 11.9  © 5845-0387 [employer]. Care instructions adapted under license by [employer] (which disclaims liability or warranty for this information). If you have questions about a medical condition or this instruction, always ask your healthcare professional. [address] any warranty or liability for your use of this information. Patient Education        High Blood Pressure: Care Instructions  Overview    It's normal for blood pressure to go up and down throughout the day. But if it stays up, you have high blood pressure. Another name for high blood pressure is hypertension. Despite what a lot of people think, high blood pressure usually doesn't cause headaches or make you feel dizzy or lightheaded. It usually has no symptoms. But it does increase your risk of stroke, heart attack, and other problems. You and your doctor will talk about your risks of these problems based on your blood pressure. Your doctor will give you a goal for your blood pressure. Your goal will be based on your health and your age. Lifestyle changes, such as eating healthy and being active, are always important to help lower blood pressure. You might also take medicine to reach your blood pressure goal.  Follow-up care is a key part of your treatment and safety. Be sure to make and go to all appointments, and call your doctor if you are having problems. It's also a good idea to know your test results and keep a list of the medicines you take. How can you care for yourself at home? Medical treatment  · If you stop taking your medicine, your blood pressure will go back up. You may take one or more types of medicine to lower your blood pressure. Be safe with medicines. Take your medicine exactly as prescribed. Call your doctor if you think you are having a problem with your medicine. · Talk to your doctor before you start taking aspirin every day.  Aspirin can help certain people lower their risk of a heart attack or stroke. But taking aspirin isn't right for everyone, because it can cause serious bleeding. · See your doctor regularly. You may need to see the doctor more often at first or until your blood pressure comes down. · If you are taking blood pressure medicine, talk to your doctor before you take decongestants or anti-inflammatory medicine, such as ibuprofen. Some of these medicines can raise blood pressure. · Learn how to check your blood pressure at home. Lifestyle changes  · Stay at a healthy weight. This is especially important if you put on weight around the waist. Losing even 10 pounds can help you lower your blood pressure. · If your doctor recommends it, get more exercise. Walking is a good choice. Bit by bit, increase the amount you walk every day. Try for at least 30 minutes on most days of the week. You also may want to swim, bike, or do other activities. · Avoid or limit alcohol. Talk to your doctor about whether you can drink any alcohol. · Try to limit how much sodium you eat to less than 2,300 milligrams (mg) a day. Your doctor may ask you to try to eat less than 1,500 mg a day. · Eat plenty of fruits (such as bananas and oranges), vegetables, legumes, whole grains, and low-fat dairy products. · Lower the amount of saturated fat in your diet. Saturated fat is found in animal products such as milk, cheese, and meat. Limiting these foods may help you lose weight and also lower your risk for heart disease. · Do not smoke. Smoking increases your risk for heart attack and stroke. If you need help quitting, talk to your doctor about stop-smoking programs and medicines. These can increase your chances of quitting for good. When should you call for help? Call 911 anytime you think you may need emergency care. This may mean having symptoms that suggest that your blood pressure is causing a serious heart or blood vessel problem.  Your blood pressure may be over 180/120.   For example, call 911 if:    · You have symptoms of a heart attack. These may include:  ? Chest pain or pressure, or a strange feeling in the chest.  ? Sweating. ? Shortness of breath. ? Nausea or vomiting. ? Pain, pressure, or a strange feeling in the back, neck, jaw, or upper belly or in one or both shoulders or arms. ? Lightheadedness or sudden weakness. ? A fast or irregular heartbeat.     · You have symptoms of a stroke. These may include:  ? Sudden numbness, tingling, weakness, or loss of movement in your face, arm, or leg, especially on only one side of your body. ? Sudden vision changes. ? Sudden trouble speaking. ? Sudden confusion or trouble understanding simple statements. ? Sudden problems with walking or balance. ? A sudden, severe headache that is different from past headaches.     · You have severe back or belly pain.    Do not wait until your blood pressure comes down on its own. Get help right away.   Call your doctor now or seek immediate care if:    · Your blood pressure is much higher than normal (such as 180/120 or higher), but you don't have symptoms.     · You think high blood pressure is causing symptoms, such as:  ? Severe headache.  ? Blurry vision.    Watch closely for changes in your health, and be sure to contact your doctor if:    · Your blood pressure measures higher than your doctor recommends at least 2 times. That means the top number is higher or the bottom number is higher, or both.     · You think you may be having side effects from your blood pressure medicine. Where can you learn more? Go to http://sharon-gem.info/. Enter U265 in the search box to learn more about \"High Blood Pressure: Care Instructions. \"  Current as of: July 22, 2018  Content Version: 11.9  © 1725-7550 [employer] ???. Care instructions adapted under license by [employer] (which disclaims liability or warranty for this information).  If you have questions about a medical condition or this instruction, always ask your healthcare professional. [place] [address] any warranty or liability for your use of this information.

## 2019-05-14 PROBLEM — I73.9 PERIPHERAL VASCULAR DISEASE (HCC): Status: ACTIVE | Noted: 2019-05-14

## 2019-05-14 PROBLEM — R20.0 NUMBNESS OF RIGHT FOOT: Status: ACTIVE | Noted: 2019-05-14

## 2019-05-14 PROBLEM — E11.42 DIABETIC POLYNEUROPATHY ASSOCIATED WITH TYPE 2 DIABETES MELLITUS (HCC): Status: ACTIVE | Noted: 2019-05-14

## 2019-05-14 PROBLEM — G62.9 NEUROPATHY: Status: ACTIVE | Noted: 2019-05-14

## 2019-08-06 PROBLEM — E66.01 SEVERE OBESITY (BMI >= 40) (HCC): Status: ACTIVE | Noted: 2019-08-06

## 2019-08-06 PROBLEM — E66.01 SEVERE OBESITY WITH BODY MASS INDEX (BMI) OF 35.0 TO 39.9 WITH SERIOUS COMORBIDITY (HCC): Status: RESOLVED | Noted: 2018-07-09 | Resolved: 2019-08-06

## 2019-12-19 ENCOUNTER — HOSPITAL ENCOUNTER (OUTPATIENT)
Dept: GENERAL RADIOLOGY | Age: 64
Discharge: HOME | End: 2019-12-19
Attending: SURGERY
Payer: COMMERCIAL

## 2019-12-19 DIAGNOSIS — K21.9 GASTROESOPHAGEAL REFLUX DISEASE WITHOUT ESOPHAGITIS: ICD-10-CM

## 2019-12-19 PROCEDURE — 74247 XR UPPER GI W KUB AIR CONT: CPT

## 2019-12-19 PROCEDURE — 74011000250 HC RX REV CODE- 250: Performed by: SURGERY

## 2019-12-19 PROCEDURE — 74011000255 HC RX REV CODE- 255: Performed by: SURGERY

## 2019-12-19 RX ADMIN — BARIUM SULFATE 355 ML: 0.6 SUSPENSION ORAL at 09:13

## 2019-12-19 RX ADMIN — ANTACID/ANTIFLATULENT 4 G: 380; 550; 10; 10 GRANULE, EFFERVESCENT ORAL at 09:13

## 2019-12-19 RX ADMIN — BARIUM SULFATE 135 ML: 980 POWDER, FOR SUSPENSION ORAL at 09:13

## 2020-02-03 ENCOUNTER — HOSPITAL ENCOUNTER (EMERGENCY)
Age: 65
Discharge: HOME | End: 2020-02-04
Attending: STUDENT IN AN ORGANIZED HEALTH CARE EDUCATION/TRAINING PROGRAM
Payer: COMMERCIAL

## 2020-02-03 DIAGNOSIS — L23.9 ALLERGIC DERMATITIS: Primary | ICD-10-CM

## 2020-02-03 PROCEDURE — 99283 EMERGENCY DEPT VISIT LOW MDM: CPT

## 2020-02-03 PROCEDURE — 96372 THER/PROPH/DIAG INJ SC/IM: CPT

## 2020-02-04 VITALS
OXYGEN SATURATION: 100 % | BODY MASS INDEX: 39.61 KG/M2 | SYSTOLIC BLOOD PRESSURE: 184 MMHG | HEART RATE: 58 BPM | RESPIRATION RATE: 16 BRPM | TEMPERATURE: 98.2 F | HEIGHT: 64 IN | DIASTOLIC BLOOD PRESSURE: 83 MMHG | WEIGHT: 232 LBS

## 2020-02-04 PROCEDURE — 74011250637 HC RX REV CODE- 250/637: Performed by: STUDENT IN AN ORGANIZED HEALTH CARE EDUCATION/TRAINING PROGRAM

## 2020-02-04 PROCEDURE — 74011250636 HC RX REV CODE- 250/636: Performed by: STUDENT IN AN ORGANIZED HEALTH CARE EDUCATION/TRAINING PROGRAM

## 2020-02-04 RX ORDER — FAMOTIDINE 20 MG/1
20 TABLET, FILM COATED ORAL 2 TIMES DAILY
Qty: 20 TAB | Refills: 0 | Status: SHIPPED | OUTPATIENT
Start: 2020-02-04 | End: 2020-02-14

## 2020-02-04 RX ORDER — DIPHENHYDRAMINE HCL 25 MG
25 CAPSULE ORAL
Qty: 20 CAP | Refills: 0 | Status: SHIPPED | OUTPATIENT
Start: 2020-02-04 | End: 2020-02-06

## 2020-02-04 RX ORDER — DEXAMETHASONE SODIUM PHOSPHATE 100 MG/10ML
10 INJECTION INTRAMUSCULAR; INTRAVENOUS
Status: COMPLETED | OUTPATIENT
Start: 2020-02-04 | End: 2020-02-04

## 2020-02-04 RX ORDER — DIPHENHYDRAMINE HCL 25 MG
50 CAPSULE ORAL
Status: COMPLETED | OUTPATIENT
Start: 2020-02-04 | End: 2020-02-04

## 2020-02-04 RX ORDER — PREDNISONE 20 MG/1
40 TABLET ORAL DAILY
Qty: 8 TAB | Refills: 0 | Status: SHIPPED | OUTPATIENT
Start: 2020-02-04 | End: 2020-02-06

## 2020-02-04 RX ADMIN — DEXAMETHASONE SODIUM PHOSPHATE 10 MG: 10 INJECTION INTRAMUSCULAR; INTRAVENOUS at 00:43

## 2020-02-04 RX ADMIN — DIPHENHYDRAMINE HYDROCHLORIDE 50 MG: 25 CAPSULE ORAL at 00:43

## 2020-02-04 NOTE — ED NOTES
I have reviewed discharge instructions with the patient. The patient verbalized understanding. Patient left ED via Discharge Method: ambulatory to Home with self. Opportunity for questions and clarification provided. Patient given 3 scripts. To continue your aftercare when you leave the hospital, you may receive an automated call from our care team to check in on how you are doing. This is a free service and part of our promise to provide the best care and service to meet your aftercare needs.  If you have questions, or wish to unsubscribe from this service please call [phone]. Thank you for Choosing our [facility].

## 2020-02-04 NOTE — ED PROVIDER NOTES
72-year-old female patient presents with reports of itching skin rash and redness. Symptoms started several hours ago suddenly. Patient denies any known exposures or reported allergies aside from codeine. But she describes itching sensation over the back of her neck, lateral aspect of her right thigh. She denies nausea, vomiting, trouble breathing or swallowing. No diarrhea. She denies any new lotions, soaps or detergents. No other known exposure.            Past Medical History:   Diagnosis Date    Congestive heart failure ([address]     Diabetic polyneuropathy associated with type 2 diabetes mellitus ([address] 5/14/2019    Dyslipidemia     Neuropathy 5/14/2019    Numbness of right foot 5/14/2019    OA (osteoarthritis)     Peripheral vascular disease ([facility] [address] [facility] 5/14/2019    Shingles 2019    Sleep apnea     pt denies    Stroke ([address]     2007- slight limp on LLE-  no use of asst device       Past Surgical History:   Procedure Laterality Date    COLONOSCOPY N/A 2/14/2018    COLONOSCOPY/ 41 performed by Kaylie [facility]ino [facility] at [facility] ENDOSCOPY    HX COLONOSCOPY      HX CYST INCISION AND DRAINAGE  1975    HX HYSTERECTOMY  1989    TOTAL         Family History:   Problem Relation Age of Onset    Sudden Death Sister 43        MI    Stroke Mother 78    Heart Disease Mother 77        \"silent heart attack\"    Hypertension Mother     Diabetes Father     Hypertension Brother     Hypertension Sister     Hypertension Brother        Social History     Socioeconomic History    Marital status:      Spouse name: Not on file    Number of children: Not on file    Years of education: Not on file    Highest education level: Not on file   Occupational History    Not on file   Social Needs    Financial resource strain: Not hard at all   [place] insecurity:     Worry: Never true     Inability: Never true   Frockadvisor needs:     Medical: No     Non-medical: No   Tobacco Use    Smoking status: Former Smoker Last attempt to quit: 1999     Years since quittin.2    Smokeless tobacco: Never Used   Substance and Sexual Activity    Alcohol use: No    Drug use: No    Sexual activity: Not Currently   Lifestyle    Physical activity:     Days per week: 5 days     Minutes per session: 30 min    Stress: Not at all   Relationships    Social connections:     Talks on phone: Not on file     Gets together: Not on file     Attends Yarsanism service: Not on file     Active member of club or organization: Not on file     Attends meetings of clubs or organizations: Not on file     Relationship status: Not on file    Intimate partner violence:     Fear of current or ex partner: No     Emotionally abused: No     Physically abused: No     Forced sexual activity: No   Other Topics Concern    Not on file   Social History Narrative    Not on file         ALLERGIES: Codeine    Review of Systems   All other systems reviewed and are negative. Vitals:    20 2339   BP: 194/83   Pulse: (!) 58   Resp: 16   Temp: 98.4 °F (36.9 °C)   SpO2: 98%   Weight: 105.2 kg (232 lb)   Height: 5' 4\" (1.626 m)            Physical Exam  Vitals signs and nursing note reviewed. Constitutional:       General: She is not in acute distress. Appearance: She is well-developed. She is not diaphoretic. Comments: Alert and oriented to person place and time. No acute distress, speaks in clear, fluid sentences. HENT:      Head: Normocephalic and atraumatic. Right Ear: External ear normal.      Left Ear: External ear normal.      Nose: Nose normal.   Eyes:      Pupils: Pupils are equal, round, and reactive to light. Neck:      Musculoskeletal: Normal range of motion. Cardiovascular:      Rate and Rhythm: Normal rate and regular rhythm. Heart sounds: Normal heart sounds. No murmur. No friction rub. No gallop. Pulmonary:      Effort: Pulmonary effort is normal. No respiratory distress.       Breath sounds: Normal breath sounds. No stridor. No decreased breath sounds, wheezing, rhonchi or rales. Chest:      Chest wall: No tenderness. Abdominal:      General: There is no distension. Palpations: Abdomen is soft. There is no mass. Tenderness: There is no abdominal tenderness. There is no guarding or rebound. Hernia: No hernia is present. Musculoskeletal: Normal range of motion. General: No tenderness or deformity. Skin:     General: Skin is warm and dry. Findings: Rash present. Rash is urticarial.      Comments: Urticarial appearing rash over the posterior aspect of the patient's upper back and neck. No discharge drainage or evidence of cellulitis. Rash consistent with allergic dermatitis. Neurological:      Mental Status: She is alert and oriented to person, place, and time. Cranial Nerves: No cranial nerve deficit. MDM  Number of Diagnoses or Management Options  Allergic dermatitis: new and does not require workup  Diagnosis management comments: Plan for outpatient treatment of allergic reaction. Patient exhibits no evidence of anaphylaxis, difficulty breathing or swallowing. Unclear of cause for reaction. Advised patient to arrange follow-up with primary care and return for worsening symptoms. Voice dictation software was used during the making of this note. This software is not perfect and grammatical and other typographical errors may be present. This note has been proofread, but may still contain errors.   [address] [facility], ; 2/4/2020 @1:28 AM   ===================================================================      Risk of Complications, Morbidity, and/or Mortality  Presenting problems: low  Diagnostic procedures: low  Management options: low    Patient Progress  Patient progress: stable         Procedures

## 2020-02-04 NOTE — DISCHARGE INSTRUCTIONS
Patient Education        Dermatitis: Care Instructions  Your Care Instructions  Dermatitis is the general name used for any rash or inflammation of the skin. Different kinds of dermatitis cause different kinds of rashes. Common causes of a rash include new medicines, plants (such as poison oak or poison ivy), heat, and stress. Certain illnesses can also cause a rash. An allergic reaction to something that touches your skin, such as latex, nickel, or poison ivy, is called contact dermatitis. Contact dermatitis may also be caused by something that irritates the skin, such as bleach, a chemical, or soap. These types of rashes cannot be spread from person to person. How long your rash will last depends on what caused it. Rashes may last a few days or months. Follow-up care is a key part of your treatment and safety. Be sure to make and go to all appointments, and call your doctor if you are having problems. It's also a good idea to know your test results and keep a list of the medicines you take. How can you care for yourself at home? · Do not scratch the rash. Cut your nails short, and file them smooth. Or wear gloves if this helps keep you from scratching. · Wash the area with water only. Pat dry. · Put cold, wet cloths on the rash to reduce itching. · Keep cool, and stay out of the sun. · Leave the rash open to the air as much as possible. · If the rash itches, use hydrocortisone cream. Follow the directions on the label. Calamine lotion may help for plant rashes. · Take an over-the-counter antihistamine, such as diphenhydramine (Benadryl) or loratadine (Claritin), to help calm the itching. Read and follow all instructions on the label. · If your doctor prescribed a cream, use it as directed. If your doctor prescribed medicine, take it exactly as directed. When should you call for help?   Call your doctor now or seek immediate medical care if:    · You have symptoms of infection, such as:  ? Increased pain, swelling, warmth, or redness. ? Red streaks leading from the area. ? Pus draining from the area. ? A fever.     · You have joint pain along with the rash.    Watch closely for changes in your health, and be sure to contact your doctor if:    · Your rash is changing or getting worse.     · You are not getting better as expected. Where can you learn more? Go to http://sharon-gem.info/. Enter [phone] in the search box to learn more about \"Dermatitis: Care Instructions. \"  Current as of: April 1, 2019  Content Version: 12.2  © 7194-8273 [employer], [employer]. Care instructions adapted under license by [employer] (which disclaims liability or warranty for this information). If you have questions about a medical condition or this instruction, always ask your healthcare professional. [address] any warranty or liability for your use of this information.

## 2020-02-22 ENCOUNTER — HOSPITAL ENCOUNTER (OUTPATIENT)
Dept: MAMMOGRAPHY | Age: 65
Discharge: HOME | End: 2020-02-22
Attending: NURSE PRACTITIONER
Payer: MEDICARE

## 2020-02-22 DIAGNOSIS — Z12.31 ENCOUNTER FOR SCREENING MAMMOGRAM FOR MALIGNANT NEOPLASM OF BREAST: ICD-10-CM

## 2020-02-22 PROCEDURE — 77067 SCR MAMMO BI INCL CAD: CPT

## 2020-05-15 ENCOUNTER — HOSPITAL ENCOUNTER (OUTPATIENT)
Age: 65
Setting detail: OBSERVATION
Discharge: HOME HEALTH | DRG: 305 | End: 2020-05-17
Attending: EMERGENCY MEDICINE | Admitting: INTERNAL MEDICINE
Payer: COMMERCIAL

## 2020-05-15 ENCOUNTER — APPOINTMENT (OUTPATIENT)
Dept: MRI IMAGING | Age: 65
DRG: 305 | End: 2020-05-15
Attending: NURSE PRACTITIONER
Payer: COMMERCIAL

## 2020-05-15 ENCOUNTER — APPOINTMENT (OUTPATIENT)
Dept: CT IMAGING | Age: 65
DRG: 305 | End: 2020-05-15
Attending: EMERGENCY MEDICINE
Payer: COMMERCIAL

## 2020-05-15 DIAGNOSIS — I16.1 HYPERTENSIVE EMERGENCY: Primary | ICD-10-CM

## 2020-05-15 DIAGNOSIS — I67.4 HYPERTENSIVE ENCEPHALOPATHY: ICD-10-CM

## 2020-05-15 LAB
ANION GAP SERPL CALC-SCNC: 6 MMOL/L (ref 7–16)
ATRIAL RATE: 69 BPM
BASOPHILS # BLD: 0 K/UL (ref 0–0.2)
BASOPHILS NFR BLD: 0 % (ref 0–2)
BUN SERPL-MCNC: 19 MG/DL (ref 8–23)
CALCIUM SERPL-MCNC: 9.5 MG/DL (ref 8.3–10.4)
CALCULATED P AXIS, ECG09: 58 DEGREES
CALCULATED R AXIS, ECG10: 70 DEGREES
CALCULATED T AXIS, ECG11: 51 DEGREES
CHLORIDE SERPL-SCNC: 104 MMOL/L (ref 98–107)
CO2 SERPL-SCNC: 29 MMOL/L (ref 21–32)
CREAT SERPL-MCNC: 1.2 MG/DL (ref 0.6–1)
DIAGNOSIS, 93000: NORMAL
DIFFERENTIAL METHOD BLD: ABNORMAL
EOSINOPHIL # BLD: 0 K/UL (ref 0–0.8)
EOSINOPHIL NFR BLD: 1 % (ref 0.5–7.8)
ERYTHROCYTE [DISTWIDTH] IN BLOOD BY AUTOMATED COUNT: 13.8 % (ref 11.9–14.6)
GLUCOSE BLD STRIP.AUTO-MCNC: 104 MG/DL (ref 65–100)
GLUCOSE BLD STRIP.AUTO-MCNC: 105 MG/DL (ref 65–100)
GLUCOSE BLD STRIP.AUTO-MCNC: 141 MG/DL (ref 65–100)
GLUCOSE SERPL-MCNC: 108 MG/DL (ref 65–100)
HCT VFR BLD AUTO: 39.7 % (ref 35.8–46.3)
HGB BLD-MCNC: 12.7 G/DL (ref 11.7–15.4)
IMM GRANULOCYTES # BLD AUTO: 0 K/UL (ref 0–0.5)
IMM GRANULOCYTES NFR BLD AUTO: 0 % (ref 0–5)
INR BLD: 1 (ref 0.9–1.2)
LYMPHOCYTES # BLD: 1.9 K/UL (ref 0.5–4.6)
LYMPHOCYTES NFR BLD: 46 % (ref 13–44)
MCH RBC QN AUTO: 27.1 PG (ref 26.1–32.9)
MCHC RBC AUTO-ENTMCNC: 32 G/DL (ref 31.4–35)
MCV RBC AUTO: 84.8 FL (ref 79.6–97.8)
MONOCYTES # BLD: 0.5 K/UL (ref 0.1–1.3)
MONOCYTES NFR BLD: 11 % (ref 4–12)
NEUTS SEG # BLD: 1.7 K/UL (ref 1.7–8.2)
NEUTS SEG NFR BLD: 42 % (ref 43–78)
NRBC # BLD: 0 K/UL (ref 0–0.2)
P-R INTERVAL, ECG05: 204 MS
PLATELET # BLD AUTO: 215 K/UL (ref 150–450)
PMV BLD AUTO: 11.3 FL (ref 9.4–12.3)
POTASSIUM SERPL-SCNC: 3.4 MMOL/L (ref 3.5–5.1)
PT BLD: 11.7 SECS (ref 9.6–11.6)
Q-T INTERVAL, ECG07: 388 MS
QRS DURATION, ECG06: 86 MS
QTC CALCULATION (BEZET), ECG08: 415 MS
RBC # BLD AUTO: 4.68 M/UL (ref 4.05–5.2)
SODIUM SERPL-SCNC: 139 MMOL/L (ref 136–145)
VENTRICULAR RATE, ECG03: 69 BPM
WBC # BLD AUTO: 4 K/UL (ref 4.3–11.1)

## 2020-05-15 PROCEDURE — 65660000000 HC RM CCU STEPDOWN

## 2020-05-15 PROCEDURE — 96375 TX/PRO/DX INJ NEW DRUG ADDON: CPT

## 2020-05-15 PROCEDURE — 74011636320 HC RX REV CODE- 636/320: Performed by: EMERGENCY MEDICINE

## 2020-05-15 PROCEDURE — 74011250636 HC RX REV CODE- 250/636: Performed by: EMERGENCY MEDICINE

## 2020-05-15 PROCEDURE — 70450 CT HEAD/BRAIN W/O DYE: CPT

## 2020-05-15 PROCEDURE — 85025 COMPLETE CBC W/AUTO DIFF WBC: CPT

## 2020-05-15 PROCEDURE — 74011000258 HC RX REV CODE- 258: Performed by: EMERGENCY MEDICINE

## 2020-05-15 PROCEDURE — 70551 MRI BRAIN STEM W/O DYE: CPT

## 2020-05-15 PROCEDURE — 99285 EMERGENCY DEPT VISIT HI MDM: CPT

## 2020-05-15 PROCEDURE — 80048 BASIC METABOLIC PNL TOTAL CA: CPT

## 2020-05-15 PROCEDURE — 96365 THER/PROPH/DIAG IV INF INIT: CPT

## 2020-05-15 PROCEDURE — 99291 CRITICAL CARE FIRST HOUR: CPT | Performed by: PSYCHIATRY & NEUROLOGY

## 2020-05-15 PROCEDURE — 74011250637 HC RX REV CODE- 250/637: Performed by: INTERNAL MEDICINE

## 2020-05-15 PROCEDURE — 82962 GLUCOSE BLOOD TEST: CPT

## 2020-05-15 PROCEDURE — 74011250636 HC RX REV CODE- 250/636: Performed by: INTERNAL MEDICINE

## 2020-05-15 PROCEDURE — 85610 PROTHROMBIN TIME: CPT

## 2020-05-15 PROCEDURE — 70496 CT ANGIOGRAPHY HEAD: CPT

## 2020-05-15 PROCEDURE — 77030040361 HC SLV COMPR DVT MDII -B

## 2020-05-15 PROCEDURE — 92610 EVALUATE SWALLOWING FUNCTION: CPT

## 2020-05-15 PROCEDURE — 99218 HC RM OBSERVATION: CPT

## 2020-05-15 PROCEDURE — 96376 TX/PRO/DX INJ SAME DRUG ADON: CPT

## 2020-05-15 PROCEDURE — C8929 TTE W OR WO FOL WCON,DOPPLER: HCPCS

## 2020-05-15 PROCEDURE — 74011000250 HC RX REV CODE- 250: Performed by: EMERGENCY MEDICINE

## 2020-05-15 PROCEDURE — 74011000250 HC RX REV CODE- 250: Performed by: INTERNAL MEDICINE

## 2020-05-15 RX ORDER — SODIUM CHLORIDE 0.9 % (FLUSH) 0.9 %
5-40 SYRINGE (ML) INJECTION EVERY 8 HOURS
Status: DISCONTINUED | OUTPATIENT
Start: 2020-05-15 | End: 2020-05-17 | Stop reason: HOSPADM

## 2020-05-15 RX ORDER — SODIUM CHLORIDE 0.9 % (FLUSH) 0.9 %
5-40 SYRINGE (ML) INJECTION AS NEEDED
Status: DISCONTINUED | OUTPATIENT
Start: 2020-05-15 | End: 2020-05-17 | Stop reason: HOSPADM

## 2020-05-15 RX ORDER — ONDANSETRON 2 MG/ML
4 INJECTION INTRAMUSCULAR; INTRAVENOUS
Status: COMPLETED | OUTPATIENT
Start: 2020-05-15 | End: 2020-05-15

## 2020-05-15 RX ORDER — MELATONIN
25 DAILY
Status: DISCONTINUED | OUTPATIENT
Start: 2020-05-16 | End: 2020-05-17 | Stop reason: HOSPADM

## 2020-05-15 RX ORDER — NICARDIPINE HYDROCHLORIDE 0.1 MG/ML
5-15 INJECTION INTRAVENOUS
Status: DISCONTINUED | OUTPATIENT
Start: 2020-05-15 | End: 2020-05-15

## 2020-05-15 RX ORDER — LORAZEPAM 2 MG/ML
1 INJECTION INTRAMUSCULAR
Status: ACTIVE | OUTPATIENT
Start: 2020-05-15 | End: 2020-05-16

## 2020-05-15 RX ORDER — NALBUPHINE HYDROCHLORIDE 10 MG/ML
5 INJECTION, SOLUTION INTRAMUSCULAR; INTRAVENOUS; SUBCUTANEOUS
Status: DISCONTINUED | OUTPATIENT
Start: 2020-05-15 | End: 2020-05-15

## 2020-05-15 RX ORDER — INSULIN LISPRO 100 [IU]/ML
0-10 INJECTION, SOLUTION INTRAVENOUS; SUBCUTANEOUS
Status: DISCONTINUED | OUTPATIENT
Start: 2020-05-15 | End: 2020-05-17 | Stop reason: HOSPADM

## 2020-05-15 RX ORDER — ROSUVASTATIN CALCIUM 20 MG/1
40 TABLET, COATED ORAL
Status: DISCONTINUED | OUTPATIENT
Start: 2020-05-15 | End: 2020-05-17 | Stop reason: HOSPADM

## 2020-05-15 RX ORDER — ACETAMINOPHEN 500 MG
500 TABLET ORAL
Status: DISCONTINUED | OUTPATIENT
Start: 2020-05-15 | End: 2020-05-17 | Stop reason: HOSPADM

## 2020-05-15 RX ORDER — SODIUM CHLORIDE 0.9 % (FLUSH) 0.9 %
10 SYRINGE (ML) INJECTION
Status: COMPLETED | OUTPATIENT
Start: 2020-05-15 | End: 2020-05-15

## 2020-05-15 RX ORDER — NICARDIPINE HYDROCHLORIDE 0.1 MG/ML
5-15 INJECTION INTRAVENOUS
Status: DISCONTINUED | OUTPATIENT
Start: 2020-05-15 | End: 2020-05-16

## 2020-05-15 RX ORDER — MORPHINE SULFATE 2 MG/ML
4 INJECTION, SOLUTION INTRAMUSCULAR; INTRAVENOUS ONCE
Status: COMPLETED | OUTPATIENT
Start: 2020-05-15 | End: 2020-05-15

## 2020-05-15 RX ADMIN — ONDANSETRON 4 MG: 2 INJECTION INTRAMUSCULAR; INTRAVENOUS at 12:28

## 2020-05-15 RX ADMIN — Medication 10 ML: at 21:24

## 2020-05-15 RX ADMIN — MORPHINE SULFATE 4 MG: 2 INJECTION, SOLUTION INTRAMUSCULAR; INTRAVENOUS at 13:02

## 2020-05-15 RX ADMIN — PERFLUTREN 1 ML: 6.52 INJECTION, SUSPENSION INTRAVENOUS at 16:18

## 2020-05-15 RX ADMIN — Medication 10 ML: at 14:46

## 2020-05-15 RX ADMIN — SODIUM CHLORIDE 100 ML: 900 INJECTION, SOLUTION INTRAVENOUS at 11:42

## 2020-05-15 RX ADMIN — ROSUVASTATIN 40 MG: 20 TABLET, FILM COATED ORAL at 21:23

## 2020-05-15 RX ADMIN — NICARDIPINE HYDROCHLORIDE 5 MG/HR: 0.1 INJECTION, SOLUTION INTRAVENOUS at 11:50

## 2020-05-15 RX ADMIN — NICARDIPINE HYDROCHLORIDE 5 MG/HR: 25 INJECTION INTRAVENOUS at 11:21

## 2020-05-15 RX ADMIN — ACETAMINOPHEN 500 MG: 500 TABLET, FILM COATED ORAL at 17:19

## 2020-05-15 RX ADMIN — Medication 10 ML: at 11:42

## 2020-05-15 RX ADMIN — IOPAMIDOL 50 ML: 755 INJECTION, SOLUTION INTRAVENOUS at 11:40

## 2020-05-15 NOTE — CONSULTS
Consult    Patient: Leigh Ann Arita MRN: [id number]     YOB: 1955  Age: 60 y.o. Sex: female      Subjective:      Leigh Ann Arita is a 60 y.o. female who is being seen for code S. Presented with sudden onset of occipital hA, dizziness, blurred vision, nausea and vomiting. The patient was last known normal at 1030. The patient presented to [facility] ED. A code S was called at [phone] Neurology arrived to the bedside at on arrival. Initial NIHSS was 0. SBP on arrival to ED was 240's, nicardipine drip was initiated. CT of the head was obtained and negative for acute intracranial abnormality. CTA of head neck was obtained and negative for LVO. She was not candidate for tPA due to low NIH. The patient was not a candidate for mechanical thrombectomy, as no large vessel occlusion was identified on CTA.     Past Medical History:   Diagnosis Date    [facility]     Diabetic polyneuropathy associated with type 2 diabetes mellitus ([facility] 2019    Dyslipidemia     Neuropathy 2019    Numbness of right foot 2019    OA (osteoarthritis)     Peripheral vascular disease ([address] 2019    Shingles 2019    Sleep apnea     pt denies    Stroke ([facility] [facility]     - slight limp on LLE-  no use of asst device     Past Surgical History:   Procedure Laterality Date    COLONOSCOPY N/A 2018    COLONOSCOPY/ 41 performed by Gabriela Narvaez MD at [facility] ENDOSCOPY    HX COLONOSCOPY      HX CYST INCISION AND DRAINAGE      HX HYSTERECTOMY      TOTAL      Family History   Problem Relation Age of Onset    Sudden Death Sister 43        MI    Stroke Mother 78    Heart Disease Mother 77        \"silent heart attack\"    Hypertension Mother     Diabetes Father     Hypertension Brother     Hypertension Sister     Hypertension Brother      Social History     Tobacco Use    Smoking status: Former Smoker     Last attempt to quit: 1999     Years since quittin.5    Smokeless tobacco: Never Used   Substance Use Topics    Alcohol use: No      Current Facility-Administered Medications   Medication Dose Route Frequency Provider Last Rate Last Dose    niCARdipine in Saline (CARDENE) 0.1mg/mL 200 ml premix infusion  5-15 mg/hr IntraVENous TITRATE Mi Espinosa MD 50 mL/hr at 05/15/20 1150 5 mg/hr at 05/15/20 1150    ondansetron (ZOFRAN) injection 4 mg  4 mg IntraVENous NOW Mi Espinosa MD         Current Outpatient Medications   Medication Sig Dispense Refill    furosemide (LASIX) 20 mg tablet TAKE 1 TAB BY MOUTH DAILY AS NEEDED (LEG SWELLING). 30 Tab 0    lisinopril-hydroCHLOROthiazide (PRINZIDE, ZESTORETIC) 10-12.5 mg per tablet TAKE 1 TAB BY MOUTH TWO (2) TIMES A DAY. 60 Tab 0    cholecalciferol (VITAMIN D3) 25 mcg (1,000 unit) cap Take  by mouth daily.  ergocalciferol (ERGOCALCIFEROL) 1,250 mcg (50,000 unit) capsule Take 1 capsule twice a week x 4 weeks, then once a week thereafter 8 Cap 2    atenoloL (TENORMIN) 100 mg tablet Take 1 Tab by mouth two (2) times a day. 180 Tab 2    rosuvastatin (CRESTOR) 40 mg tablet Take 1 Tab by mouth nightly. 90 Tab 0    potassium chloride (K-DUR, KLOR-CON) 20 mEq tablet Take 1 Tab by mouth daily as needed (with lasix for leg swelling). 30 Tab 1    meloxicam (MOBIC) 7.5 mg tablet Take 1 Tab by mouth daily. 30 Tab 2    glucose blood VI test strips (ACCU-CHEK SANTINO PLUS TEST STRP) strip Test up to 2 times a day 100 Strip 2    Blood-Glucose Meter misc 1 Units by Does Not Apply route DIALYSIS PRN. 1 Each 0    metFORMIN (GLUMETZA ER) 500 mg TG24 24 hour tablet Take 1,000 mg by mouth daily.  180 Tab 2        Allergies   Allergen Reactions    Codeine Swelling     Severe Facial swelling       Review of Systems:  Not obtained due to emergent situation         Objective:     Vitals:    05/15/20 1118 05/15/20 1135 05/15/20 1150   BP: (!) 244/110 160/90 175/77   Pulse: 64 62 63   Resp: 22     Temp: 98.3 °F (36.8 °C)     SpO2: 100% Weight: 230 lb (104.3 kg)     Height: 5' 4\" (1.626 m)          Physical Exam:  General - Well developed, well nourished, in no apparent distress. Pleasant and conversent. HEENT - Normocephalic, atraumatic. Conjunctiva, tympanic membranes, and oropharynx are clear. Neck - Supple without masses, no bruits   Cardiovascular - Regular rate and rhythm. Normal S1, S2 without murmurs, rubs, or gallops. Lungs - Clear to auscultation. Abdomen - Soft, nontender with normal bowel sounds. Extremities - Peripheral pulses intact. No edema and no rashes. Neurological examination - Comprehension, attention, memory and reasoning are intact. Language and speech are normal.   On cranial nerve examination, (II, III, IV, VI) pupils are equal, round, and reactive to light. Fundoscopic exam is normal. Visual acuity is adequate. Visual fields are full to finger confrontation. Extraocular motility is right gaze nystagmus (V, VII) Face is symmetric and sensation is intact to light touch. (VIII) Hearing is intact. (IX, X) Palate and uvula elevate symmetrically. Voice is normal. (XI) Shoulder shrug is strong and equal bilaterally. (XII)Tongue is midline. Motor examination - There is normal muscle tone and bulk. Power is full throughout. Muscle stretch reflexes are normoactive and there are no pathological reflexes present. Plantar response is flexor. Sensation is intact to light touch, pinprick, vibration and proprioception in all extremities.  Cerebellar examination is normal. Gait and stance are normal.     NIHSS   NIHSS Score: 0  1a-Level of Consciousness 0  1b-What is Month/Age 0  1c-Open/Close Eyes&Hand 0  2 -Best Gaze 0  3 -Visual Fields 0  4 -Facial Palsy 0  5a-Motor-Left Arm 0  5b-Motor-Right Arm 0  6a-Motor-Left Leg 0  6b-Motor-Right Leg 0  7 -Limb Ataxia 0  8 -Sensory 0  9 -Best Language 0  10-Dysarthria 0  11-Extinction/Inattention 0    Lab Results   Component Value Date/Time    Cholesterol, total 229 (H) 02/06/2020 10:10 AM    HDL Cholesterol 58 02/06/2020 10:10 AM    LDL, calculated 128 (H) 02/06/2020 10:10 AM    VLDL, calculated 43 (H) 02/06/2020 10:10 AM    Triglyceride 213 (H) 02/06/2020 10:10 AM    CHOL/HDL Ratio 2.7 05/15/2017 09:12 AM        Lab Results   Component Value Date/Time    Hemoglobin A1c 5.7 (H) 02/06/2020 10:10 AM    Hemoglobin A1c, External 5.3 10/04/2016        Images personally reviewed by me at 1134. CT Results (most recent):  Results from Hospital Encounter encounter on 05/15/20   CT CODE NEURO HEAD WO CONTRAST    Narrative EXAMINATION: CT  HEAD 5/15/2020 11:32 AM    ACCESSION NUMBER:  [id number]    INDICATION:  patient with acute neuro changes, occipital headache, neck pain,  dizziness, nausea for about our    COMPARISON: Head CT 10/5/2008    TECHNIQUE: Contiguous axial computed tomographic images were obtained of the  head without intravenous contrast.    Radiation dose reduction techniques were used for this study:  Our CT scanners  use one or all of the following: Automated exposure control, adjustment of the  mA and/or kVp according to patient's size, iterative reconstruction. FINDINGS:    There is no evidence of acute intracranial hemorrhage or extra-axial  collections. There is no CT evidence of acute infarction. The ventricles are within normal limits. There is no midline shift. The basilar  cisterns are patent. There is no cerebellar tonsillar ectopia or herniation. The paranasal sinuses and mastoid air cells are well aerated and clear. Prior  bilateral lens replacement. Impression IMPRESSION:    No acute intracranial abnormality. VOICE DICTATED BY: Dr. Bushra Krishnamurthy       No results found for this or any previous visit. MRI Results (most recent):   No results found for this or any previous visit. Most recent CTA:      Most recent MRA:  No results found for this or any previous visit.     Most recent Echo:  No results found for this visit on 05/15/20. Assessment:     60year old  seen as a code S with sudden onset of severe occiptial HA, dizziness, blurred vision, nausea and vomiting. Initial NIHSS was 0. SBP on arrival to ED was 240's, nicardipine drip was initiated. CT of the head was obtained and negative for acute intracranial abnormality. CTA of head neck was obtained and negative for LVO. She was not candidate for tPA due to low NIH and concern for possible elevated ICP. The patient was not a candidate for mechanical thrombectomy, as no large vessel occlusion was identified on CTA. Differentials include but are not limited to stroke v. CT scan demonstrating concern for possible PRES v. Increased ICP. Continue Nicardipine drip to maintain -170. Stat MRI of brain pending. Plan:     · Start ASA 81 mg daily   · Initiate high intensity statin   · Neurochecks Q4H  · Stat MRI of brain  · CTA of head and neck done  · Bedside swallow test   · Labs: A1c, FLP, TSH, Cardiac enzymes, BMP, CBC  · Telemetry and echocardiogram with bubble study  · PT/OT/ST  · DVT prophylaxis   · BP management - continue nicardipine drip to maintain -170s  · Smoking cessation if applicable   · Diabetes education if applicable   · Depression Screening prior to discharge      Signed By: Kajal Alexis NP     May 15, 2020      Transcribed for Dr. Angelita Garland was in attendance for the entire Code S and provided critical care management of this patient with severe encephalopathy related to malignant hypertension with probable presence of posterior reversible encephalopathy syndrome.   Time 60 minutes critical care

## 2020-05-15 NOTE — PROGRESS NOTES
05/15/20 1624   Cardiac   Cardiac (WDL) X   B/P Outside of Defined Limits Yes   Cardiac Regularity Regular   Cardiac Rhythm Sinus Donnie  (per tele 52)   Cardiac/Telemetry Monitor On Yes   MD notified

## 2020-05-15 NOTE — PROGRESS NOTES
05/15/20 1432   Dual Skin Pressure Injury Assessment   Dual Skin Pressure Injury Assessment WDL   Second Care Provider (Based on [address] [place] [address]) Shanell Cardona RN    Skin Integumentary   Skin Integumentary (WDL) WDL   Skin Color Appropriate for ethnicity   Skin Condition/Temp Warm;Dry   Skin Integrity Intact   Turgor Non-tenting   Wound Prevention and Protection Methods   Orientation of Wound Prevention Posterior   Location of Wound Prevention Sacrum/Coccyx   Dressing Present  No   Wound Offloading (Prevention Methods) Bed, pressure reduction mattress

## 2020-05-15 NOTE — PROGRESS NOTES
No dysphagia goals identified as oropharyngeal swallow function is within normal limits. STG: Patient will participate in cognitive linguistic evaluation if indicated pending workup    SPEECH LANGUAGE PATHOLOGY: DYSPHAGIA- Initial Assessment    NAME/AGE/GENDER: Jeramie Hickman is a 60 y.o. female  DATE: 5/15/2020  PRIMARY DIAGNOSIS: Hypertensive emergency [I16.1]      ICD-10: Treatment Diagnosis: R13.12 Dysphagia, Oropharyngeal Phase    RECOMMENDATIONS   DIET:    PO:  Regular   Liquids:  regular thin    MEDICATIONS: With liquid     ASPIRATION PRECAUTIONS  · Slow rate of intake  · Small bites/sips  · Upright at 90 degrees during meal     COMPENSATORY STRATEGIES/MODIFICATIONS  · Alternate liquids/solids  · Small sips and bites  · Slow rate of intake  · Remain upright 20-30 min after intake     EDUCATION:  · Recommendations discussed with Nursing  · Patient     CONTINUATION OF SKILLED SERVICES/MEDICAL NECESSITY:  No dysphagia goals identified as swallow function is within normal limits. Will follow up for cognitive linguistic evaluation if deemed appropriate pending imaging     RECOMMENDATIONS for CONTINUED SPEECH THERAPY:   YES: Anticipate need for ongoing speech therapy during this hospitalization. ASSESSMENT   Patient presents with normal oropharyngeal swallow. No overt s/sx aspiration and normal oral phase. S/sx possible esophageal dysphagia given complaints of sticking in chest with liquids and solids and frequent sensation of needing to belch. Recommend regular diet/thin liquids. meds 1 at a time with liquid rinse. Discussed compensatory strategies listed above. No oropharyngeal dysphagia treatment indicated. Will follow up for cognitive linguistic evaluation if indicated pending workup. REHABILITATION POTENTIAL FOR STATED GOALS: Excellent    PLAN    FREQUENCY/DURATION: Speech therapy to follow up for assessment of cognitive-linguistic function.      SUBJECTIVE   Upright in bed, RN starting nursing dysphagia stand per protocol. Complaints of headache. RN present and aware. History of Present Injury/Illness: Ms. Danita Driver  has a past medical history of Congestive heart failure ([facility] [address] Diabetic polyneuropathy associated with type 2 diabetes mellitus ([facility] [address] (5/14/2019), Dyslipidemia, Neuropathy (5/14/2019), Numbness of right foot (5/14/2019), OA (osteoarthritis), Peripheral vascular disease ([facility] [address] (5/14/2019), Shingles (2019), Sleep apnea, and Stroke ([facility] [address] [facility] . She also  has a past surgical history that includes hx colonoscopy; hx hysterectomy (1989); hx cyst incision and drainage (1975); and colonoscopy (N/A, 2/14/2018). Problem List:  (Impairments causing functional limitations):  1. Oropharyngeal dysphagia- No symptoms identified    Previous Dysphagia: reports history of hiatal hernia and \"slowed flow\" and \"it curves\"  Diet Prior to Evaluation: NPO     Per chart review:  Upper GI w KUB Air cont 12/19/2019: 1. Few tertiary contractions throughout the esophagus, with otherwise unremarkable evaluation.     Orientation:   Person  Place  Time  Situation    Pain: Pain Scale 1: Numeric (0 - 10)  Pain Intensity 1: 10  Pain Location 1: Head  Pain Intervention(s) 1: Nurse notified    Cognitive-Linguistic Screen:   Speech Production:   o WNL   Expressive Language:  o WNL     Receptive Language:  o WNL     Cognition:   o Needs further assessment  o Pending workup    OBJECTIVE   Oral Motor:   · Labial: No impairment  · Dentition: Edentulous  · Oral Hygiene: Adequate  · Lingual: No impairment    Swallow evaluation:   Patient presented with thin liquid via cup and straw, puree, mixed, and solid consistencies. Appropriate oral prep with all textures, despite edentulous state. Complete oral clearance. Timely swallow initiation, and single swallows upon palpation. Serial swallows tolerated without overt s/sx and vocal quality clear.  No overt signs or symptoms of airway compromise observed with liquid or solid textures. Complaints of sticking sensation mid sternum with liquids and solids. Reported sensation of needing to belch. Occasional belching throughout. INTERDISCIPLINARY COLLABORATION: Registered Nurse  PRECAUTIONS/ALLERGIES: Codeine     Tool Used: Dysphagia Outcome and Severity Scale (DEL)    Score Comments   Normal Diet  [x] 7 With no strategies or extra time needed   Functional Swallow  [] 6 May have mild oral or pharyngeal delay   Mild Dysphagia  [] 5 Which may require one diet consistency restricted    Mild-Moderate Dysphagia  [] 4 With 1-2 diet consistencies restricted   Moderate Dysphagia  [] 3 With 2 or more diet consistencies restricted   Moderate-Severe Dysphagia  [] 2 With partial PO strategies (trials with ST only)   Severe Dysphagia  [] 1 With inability to tolerate any PO safely      Score:  Initial: 7 Most Recent: 7 (Date 05/15/20 )   Interpretation of Tool: The Dysphagia Outcome and Severity Scale (DEL) is a simple, easy-to-use, 7-point scale developed to systematically rate the functional severity of dysphagia based on objective assessment and make recommendations for diet level, independence level, and type of nutrition. Current Medications:   No current facility-administered medications on file prior to encounter. Current Outpatient Medications on File Prior to Encounter   Medication Sig Dispense Refill    furosemide (LASIX) 20 mg tablet TAKE 1 TAB BY MOUTH DAILY AS NEEDED (LEG SWELLING). 30 Tab 0    lisinopril-hydroCHLOROthiazide (PRINZIDE, ZESTORETIC) 10-12.5 mg per tablet TAKE 1 TAB BY MOUTH TWO (2) TIMES A DAY. 60 Tab 0    cholecalciferol (VITAMIN D3) 25 mcg (1,000 unit) cap Take  by mouth daily.  ergocalciferol (ERGOCALCIFEROL) 1,250 mcg (50,000 unit) capsule Take 1 capsule twice a week x 4 weeks, then once a week thereafter 8 Cap 2    atenoloL (TENORMIN) 100 mg tablet Take 1 Tab by mouth two (2) times a day.  180 Tab 2    rosuvastatin (CRESTOR) 40 mg tablet Take 1 Tab by mouth nightly. 90 Tab 0    potassium chloride (K-DUR, KLOR-CON) 20 mEq tablet Take 1 Tab by mouth daily as needed (with lasix for leg swelling). 30 Tab 1    meloxicam (MOBIC) 7.5 mg tablet Take 1 Tab by mouth daily. 30 Tab 2    glucose blood VI test strips (ACCU-CHEK SANTINO PLUS TEST STRP) strip Test up to 2 times a day 100 Strip 2    Blood-Glucose Meter misc 1 Units by Does Not Apply route DIALYSIS PRN. 1 Each 0    metFORMIN (GLUMETZA ER) 500 mg TG24 24 hour tablet Take 1,000 mg by mouth daily.  180 Tab 2       After treatment position/precautions:  · Upright in bed  · RN notified  · Call light within reach    Total Treatment Duration:   Time In: 9925  Time Out: [address], [facility], [address] [facility]

## 2020-05-15 NOTE — ED TRIAGE NOTES
Pt arrives from work. 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 Pt started vomiting in route. Pt has a hx of CVA in 2007. CINN negative in route, but HTN with SBP in 240's. Last /100 on arrival. Dr. Archana Pozo arrives to bedside with Neuro team. Nystagmus reported in right eye by Dr. Archana Pozo. Pt is not on thinners. Pt has no hx of ICH. Pt exhibiting some weakness in BLE. Pharm at bedside. Pt states she takes antiHTN and did take it today.

## 2020-05-15 NOTE — H&P
HOSPITALIST H&P/CONSULT  NAME:  Mateo Arevalo   Age:  60 y.o.  :   1955   MRN:   [id number]  PCP: Sebastian Jones NP  Consulting MD:  Treatment Team: Attending Provider: Binh Hayward DO; Consulting Provider: Harpreet Carty MD; Utilization Review: Tobin Heck RN; Consulting Provider: Ferdinand Boeck, Rockford Blanc, MD  HPI:   61 yo AAF with past history of prior CVA, HLD, HTN, and DM type II presents with a 2-day history of worsening dizziness, headache, and nausea without vomiting. She says her symptoms have been intermittent and \"come and go\" but is worsened \"whenever I bent forward. \" She is involved with patient care and has been trying to [facility] at a distant object to make my symptoms go away. \" Due to increasing severity of her symptoms she called EMS. She denies chest pain, shortness of breath, or abdominal pain. She describes her headaches as \"tension headaches across the front that go to the back of my head. \" No associated blurry vision, difficulty with speech/swallowing. ED Course: noted SBPs in the 240s. Code S called. NIH of 0. Neurology consulted. Not a tPA candidate due to low NIH. CT head negative. Etiology thought likely CVA vs PRES. Started on Cardene gtt but had to be stopped as SBPs fell to the 130s. Hospitalist called for admission.      Complete ROS done and is as stated in HPI or otherwise negative  Past Medical History:   Diagnosis Date    Congestive heart failure ([address]     Diabetic polyneuropathy associated with type 2 diabetes mellitus ([address] 2019    Dyslipidemia     Neuropathy 2019    Numbness of right foot 2019    OA (osteoarthritis)     Peripheral vascular disease ([address] 2019    Shingles 2019    Sleep apnea     pt denies    Stroke ([address]     - slight limp on LLE-  no use of asst device      Past Surgical History:   Procedure Laterality Date    COLONOSCOPY N/A 2018    COLONOSCOPY/ 41 performed by Muna Campbell MD at [facility] ENDOSCOPY    HX COLONOSCOPY      HX CYST INCISION AND DRAINAGE      HX HYSTERECTOMY      TOTAL      Prior to Admission Medications   Prescriptions Last Dose Informant Patient Reported? Taking? Blood-Glucose Meter misc   No No   Si Units by Does Not Apply route DIALYSIS PRN. atenoloL (TENORMIN) 100 mg tablet   No No   Sig: Take 1 Tab by mouth two (2) times a day. cholecalciferol (VITAMIN D3) 25 mcg (1,000 unit) cap   Yes No   Sig: Take  by mouth daily. ergocalciferol (ERGOCALCIFEROL) 1,250 mcg (50,000 unit) capsule   No No   Sig: Take 1 capsule twice a week x 4 weeks, then once a week thereafter   furosemide (LASIX) 20 mg tablet   No No   Sig: TAKE 1 TAB BY MOUTH DAILY AS NEEDED (LEG SWELLING). glucose blood VI test strips (ACCU-CHEK SANTINO PLUS TEST STRP) strip   No No   Sig: Test up to 2 times a day   lisinopril-hydroCHLOROthiazide (PRINZIDE, ZESTORETIC) 10-12.5 mg per tablet   No No   Sig: TAKE 1 TAB BY MOUTH TWO (2) TIMES A DAY. meloxicam (MOBIC) 7.5 mg tablet   No No   Sig: Take 1 Tab by mouth daily. metFORMIN (GLUMETZA ER) 500 mg TG24 24 hour tablet   No No   Sig: Take 1,000 mg by mouth daily. potassium chloride (K-DUR, KLOR-CON) 20 mEq tablet   No No   Sig: Take 1 Tab by mouth daily as needed (with lasix for leg swelling). rosuvastatin (CRESTOR) 40 mg tablet   No No   Sig: Take 1 Tab by mouth nightly.       Facility-Administered Medications: None     Allergies   Allergen Reactions    Codeine Swelling     Severe Facial swelling      Social History     Tobacco Use    Smoking status: Former Smoker     Last attempt to quit: 1999     Years since quittin.5    Smokeless tobacco: Never Used   Substance Use Topics    Alcohol use: No      Family History   Problem Relation Age of Onset    Sudden Death Sister 43        MI    Stroke Mother 78    Heart Disease Mother 77        \"silent heart attack\"    Hypertension Mother     Diabetes Father     Hypertension Brother     Hypertension Sister     Hypertension Brother       Objective:     Visit Vitals  BP (!) 140/94 (BP 1 Location: Left arm, BP Patient Position: At rest)   Pulse (!) 50   Temp 97.5 °F (36.4 °C)   Resp 18   Ht 5' 4\" (1.626 m)   Wt 104.3 kg (230 lb)   SpO2 95%   BMI 39.48 kg/m²      Temp (24hrs), Av.8 °F (36.6 °C), Min:97.5 °F (36.4 °C), Max:98.3 °F (36.8 °C)    Oxygen Therapy  O2 Sat (%): 95 % (05/15/20 1600)  Pulse via Oximetry: 75 beats per minute (05/15/20 1235)  O2 Device: Room air (05/15/20 1126)  Physical Exam:  General:    Alert, cooperative, no distress, appears stated age. Head:   Normocephalic, without obvious abnormality, atraumatic. Nose:  Nares normal. No drainage or sinus tenderness. Lungs:   Clear to auscultation bilaterally. No Wheezing or Rhonchi. No rales. Heart:   Regular rate and rhythm,  no murmur, rub or gallop. Abdomen:   Soft, non-tender. Not distended. Bowel sounds normal.   Extremities: No cyanosis. No edema. No clubbing  Skin:     Texture, turgor normal. No rashes or lesions. Not Jaundiced  Neurologic: Alert and oriented x3. +4/5 muscle strength on RLE extension compared to left. Sensation to light touch intact b/l. CN II thru XII intact b/l.  Finger-to-nose testing normal.    Data Review:   Recent Results (from the past 24 hour(s))   GLUCOSE, POC    Collection Time: 05/15/20 11:21 AM   Result Value Ref Range    Glucose (POC) 105 (H) 65 - 100 mg/dL   POC PT/INR    Collection Time: 05/15/20 11:24 AM   Result Value Ref Range    Prothrombin time (POC) 11.7 (H) 9.6 - 11.6 SECS    INR (POC) 1.0 0.9 - 1.2     CBC WITH AUTOMATED DIFF    Collection Time: 05/15/20 11:27 AM   Result Value Ref Range    WBC 4.0 (L) 4.3 - 11.1 K/uL    RBC 4.68 4.05 - 5.2 M/uL    HGB 12.7 11.7 - 15.4 g/dL    HCT 39.7 35.8 - 46.3 %    MCV 84.8 79.6 - 97.8 FL    MCH 27.1 26.1 - 32.9 PG    MCHC 32.0 31.4 - 35.0 g/dL    RDW 13.8 11.9 - 14.6 %    PLATELET 290 540 - 770 K/uL    MPV 11.3 9.4 - 12.3 FL    ABSOLUTE NRBC 0.00 0.0 - 0.2 K/uL    DF AUTOMATED      NEUTROPHILS 42 (L) 43 - 78 %    LYMPHOCYTES 46 (H) 13 - 44 %    MONOCYTES 11 4.0 - 12.0 %    EOSINOPHILS 1 0.5 - 7.8 %    BASOPHILS 0 0.0 - 2.0 %    IMMATURE GRANULOCYTES 0 0.0 - 5.0 %    ABS. NEUTROPHILS 1.7 1.7 - 8.2 K/UL    ABS. LYMPHOCYTES 1.9 0.5 - 4.6 K/UL    ABS. MONOCYTES 0.5 0.1 - 1.3 K/UL    ABS. EOSINOPHILS 0.0 0.0 - 0.8 K/UL    ABS. BASOPHILS 0.0 0.0 - 0.2 K/UL    ABS. IMM. GRANS. 0.0 0.0 - 0.5 K/UL   METABOLIC PANEL, BASIC    Collection Time: 05/15/20 11:27 AM   Result Value Ref Range    Sodium 139 136 - 145 mmol/L    Potassium 3.4 (L) 3.5 - 5.1 mmol/L    Chloride 104 98 - 107 mmol/L    CO2 29 21 - 32 mmol/L    Anion gap 6 (L) 7 - 16 mmol/L    Glucose 108 (H) 65 - 100 mg/dL    BUN 19 8 - 23 MG/DL    Creatinine 1.20 (H) 0.6 - 1.0 MG/DL    GFR est AA 59 (L) >60 ml/min/1.73m2    GFR est non-AA 48 (L) >60 ml/min/1.73m2    Calcium 9.5 8.3 - 10.4 MG/DL   EKG, 12 LEAD, INITIAL    Collection Time: 05/15/20 12:14 PM   Result Value Ref Range    Ventricular Rate 69 BPM    Atrial Rate 69 BPM    P-R Interval 204 ms    QRS Duration 86 ms    Q-T Interval 388 ms    QTC Calculation (Bezet) 415 ms    Calculated P Axis 58 degrees    Calculated R Axis 70 degrees    Calculated T Axis 51 degrees    Diagnosis       !! AGE AND GENDER SPECIFIC ECG ANALYSIS !! Normal sinus rhythm  Normal ECG  When compared with ECG of 15-MAY-2020 12:13,  Sinus rhythm has replaced Junctional rhythm  ST no longer depressed in Inferior leads  T wave inversion no longer evident in Inferior leads  QT has shortened  Confirmed by Tomas Velasco MD (), DESEAN BRANCH ([id number]) on 5/15/2020 3:03:36 PM     GLUCOSE, POC    Collection Time: 05/15/20  4:19 PM   Result Value Ref Range    Glucose (POC) 104 (H) 65 - 100 mg/dL     Imaging /Procedures /Studies     Assessment and Plan:      Active Hospital Problems    Diagnosis Date Noted    Hypertensive emergency 05/15/2020    Severe obesity (BMI >= 40) ([facility] 08/06/2019    Peripheral vascular disease ([facility] 05/14/2019    Type 2 diabetes mellitus ([facility] 10/31/2017    Dyslipidemia     History of stroke     Hypertension      Diagnosed at the age of 50         PLAN    # Hypertensive emergency with stroke-like symptoms  - NIH of 0 on presentation, not tPA candidate   - CT head and CTA head unremarkable  - neurology consulted and following  - started on Cardene gtt in ED but held due to arizmendi drop on SBPs to 130s  - goal SBPs between 150s-170s  - hold home BP meds for now  - MRI head ordered and pending  - start ASA 81 mg  - high intensity statin  - secondary risk stratification with HgbA1c and lipid panel  - PT/OT/speech therapy consults  - continuous neuro checks  - telemetry ordered    # DM type II  - hold home metformin  - Humalog SSI and serial CBGs  - goal CBGs between 140-180    # HLD   - high intensity statin    F/E/N: no fluids, replete electrolytes as needed, diabetic diet    Ppx: SCDs for VTE    Code Status: FULL CODE    Disposition: Admit to Inpatient with plan as above. PT/OT consults. Discussed with patient at bedside and daughter via phone. All questions answered.      Signed By: Rebekah Aguilar DO     May 15, 2020

## 2020-05-15 NOTE — PROGRESS NOTES
05/15/20 1909   NIH Stroke Scale   Interval Other (comment)  (HEMA [place]ider )   LOC 0   LOC Questions 0   LOC Commands 0   Best Gaze 0   Visual 0   Facial Palsy 0   Motor Right Arm 0   Motor Left Arm 0   Motor Right Leg 1   Motor Left Leg 0   Limb Ataxia 0   Sensory 0   Best Language 0   Dysarthria 0   Extinction and Inattention 0   Total 1

## 2020-05-15 NOTE — ED NOTES
TRANSFER - OUT REPORT:    Verbal report given to HEMA Villalobos(name) on [facility]  being transferred to [facility](unit) for routine progression of care       Report consisted of patients Situation, Background, Assessment and   Recommendations(SBAR). Information from the following report(s) ED Summary was reviewed with the receiving nurse. Lines:   Peripheral IV 05/15/20 Right Antecubital (Active)       Peripheral IV 05/15/20 Left Antecubital (Active)   Site Assessment Clean, dry, & intact 5/15/2020 11:25 AM   Phlebitis Assessment 0 5/15/2020 11:25 AM   Infiltration Assessment 0 5/15/2020 11:25 AM   Dressing Status Clean, dry, & intact 5/15/2020 11:25 AM   Dressing Type Transparent 5/15/2020 11:25 AM   Hub Color/Line Status Pink 5/15/2020 11:25 AM       Peripheral IV 05/15/20 Left Forearm (Active)        Opportunity for questions and clarification was provided.       Patient transported with:   Registered Nurse

## 2020-05-15 NOTE — PROGRESS NOTES
Problem: Patient Education: Go to Patient Education Activity  Goal: Patient/Family Education  Outcome: Progressing Towards Goal     Problem: Falls - Risk of  Goal: *Absence of Falls  Description: Document Darío Bhakta Fall Risk and appropriate interventions in the flowsheet.   Outcome: Progressing Towards Goal  Note: Fall Risk Interventions:  Mobility Interventions: Assess mobility with egress test         Medication Interventions: Bed/chair exit alarm, Patient to call before getting OOB    Elimination Interventions: Bed/chair exit alarm, Call light in reach, Toileting schedule/hourly rounds

## 2020-05-15 NOTE — ED PROVIDER NOTES
80-year-old female with a history of hypertension, congestive heart failure, diabetes, high cholesterol, neuropathy, peripheral vascular disease, sleep apnea, stroke presents with sudden onset severe posterior headache, dizziness, blurry vision, nausea, and vomiting about 1 hour ago. Blood pressure 240s with EMS. She does not take anticoagulants. She reports diffuse weakness. Called as a code stroke. Neurology, Dr Karel Bernal, at bedside. I wore appropriate PPE throughout this patient's ED visit. Juan Carlos Guzman MD, 11:42 AM          Headache    Associated symptoms include dizziness, nausea and vomiting. Pertinent negatives include no fever, no palpitations, no shortness of breath and no weakness. Vomiting    Associated symptoms include headaches and headaches. Pertinent negatives include no chills, no fever, no abdominal pain, no diarrhea and no cough.         Past Medical History:   Diagnosis Date    Congestive heart failure ([address]     Diabetic polyneuropathy associated with type 2 diabetes mellitus ([address] 5/14/2019    Dyslipidemia     Neuropathy 5/14/2019    Numbness of right foot 5/14/2019    OA (osteoarthritis)     Peripheral vascular disease ([address] 5/14/2019    Shingles 2019    Sleep apnea     pt denies    Stroke ([address]     2007- slight limp on LLE-  no use of asst device       Past Surgical History:   Procedure Laterality Date    COLONOSCOPY N/A 2/14/2018    COLONOSCOPY/ 41 performed [facility] at [facility] ENDOSCOPY    HX COLONOSCOPY      HX CYST INCISION AND DRAINAGE  1975    HX HYSTERECTOMY  1989    TOTAL         Family History:   Problem Relation Age of Onset    Sudden Death Sister 43        MI    Stroke Mother 78    Heart Disease Mother 77        \"silent heart attack\"    Hypertension Mother     Diabetes Father     Hypertension Brother     Hypertension Sister     Hypertension Brother        Social History     Socioeconomic History    Marital status:      Spouse name: Not on file    Number of children: Not on file    Years of education: Not on file    Highest education level: Not on file   Occupational History    Not on file   Social Needs    Financial resource strain: Not hard at all    Food insecurity     Worry: Never true     Inability: Never true   [employer] needs     Medical: No     Non-medical: No   Tobacco Use    Smoking status: Former Smoker     Last attempt to quit: 1999     Years since quittin.5    Smokeless tobacco: Never Used   Substance and Sexual Activity    Alcohol use: No    Drug use: No    Sexual activity: Not Currently   Lifestyle    Physical activity     Days per week: 5 days     Minutes per session: 30 min    Stress: Not at all   Relationships    Social connections     Talks on phone: Not on file     Gets together: Not on file     Attends Alevism service: Not on file     Active member of club or organization: Not on file     Attends meetings of clubs or organizations: Not on file     Relationship status:     Intimate partner violence     Fear of current or ex partner: No     Emotionally abused: No     Physically abused: No     Forced sexual activity: No   Other Topics Concern    Not on file   Social History Narrative    Not on file         ALLERGIES: Codeine    Review of Systems   Constitutional: Negative for chills and fever. HENT: Negative for hearing loss. Eyes: Positive for visual disturbance. Respiratory: Negative for cough and shortness of breath. Cardiovascular: Negative for chest pain and palpitations. Gastrointestinal: Positive for nausea and vomiting. Negative for abdominal pain and diarrhea. Musculoskeletal: Positive for neck pain. Negative for back pain. Skin: Negative for rash. Neurological: Positive for dizziness and headaches. Negative for weakness. Psychiatric/Behavioral: Negative for confusion.        Vitals:    05/15/20 1118   BP: (!) 244/110   Pulse: 64   Resp: 22   Temp: 98.3 °F (36.8 °C)   SpO2: 100%   Weight: 104.3 kg (230 lb)   Height: 5' 4\" (1.626 m)            Physical Exam  Vitals signs and nursing note reviewed. Constitutional:       Appearance: She is well-developed. She is ill-appearing and diaphoretic. HENT:      Head: Normocephalic and atraumatic. Eyes:      Pupils: Pupils are equal, round, and reactive to light. Neck:      Musculoskeletal: Normal range of motion and neck supple. Cardiovascular:      Rate and Rhythm: Regular rhythm. Heart sounds: Normal heart sounds. Pulmonary:      Effort: Pulmonary effort is normal.      Breath sounds: Normal breath sounds. Abdominal:      Palpations: Abdomen is soft. Tenderness: There is no abdominal tenderness. Musculoskeletal: Normal range of motion. Skin:     General: Skin is warm. Neurological:      General: No focal deficit present. Mental Status: She is alert. Cranial Nerves: No cranial nerve deficit. Comments: diffuse weakness, nystagmus to right   Psychiatric:         Behavior: Behavior normal.          MDM  Number of Diagnoses or Management Options  Diagnosis management comments: Parts of this document were created using dragon voice recognition software. The chart has been reviewed but errors may still be present. Started on cardene gtt for /110    In CT.    11:46 AM  Dr Sandie Spring does NOT want TPA until STAT MRI completed. He is concerned for increased ICP/PRES. BP improved. 12:39 PM  MRI pending. dw hospitalist for admission  CRITICAL CARE Documentation: This patient is critically ill and there is a high probability of of imminent or life threatening deterioration in the patient's condition without immediate management. The nature of the patient's clinical problem is: acute stroke, hypertensive emergency    I have spent 30 minutes in direct patient care, documentation, review of labs/xrays/old records, discussion with Staff, Colleague .      The time involved in the performance of separately reportable procedures was not counted toward critical care time. Fiona Love MD; 5/15/2020 @11:43 AM           Amount and/or Complexity of Data Reviewed  Clinical lab tests: reviewed and ordered (Results for orders placed or performed during the hospital encounter of 05/15/20  -CBC WITH AUTOMATED DIFF       Result                      Value             Ref Range           WBC                         4.0 (L)           4.3 - 11.1 K*       RBC                         4.68              4.05 - 5.2 M*       HGB                         12.7              11.7 - 15.4 *       HCT                         39.7              35.8 - 46.3 %       MCV                         84.8              79.6 - 97.8 *       MCH                         27.1              26.1 - 32.9 *       MCHC                        32.0              31.4 - 35.0 *       RDW                         13.8              11.9 - 14.6 %       PLATELET                    215               150 - 450 K/*       MPV                         11.3              9.4 - 12.3 FL       ABSOLUTE NRBC               0.00              0.0 - 0.2 K/*       DF                          AUTOMATED                             NEUTROPHILS                 42 (L)            43 - 78 %           LYMPHOCYTES                 46 (H)            13 - 44 %           MONOCYTES                   11                4.0 - 12.0 %        EOSINOPHILS                 1                 0.5 - 7.8 %         BASOPHILS                   0                 0.0 - 2.0 %         IMMATURE GRANULOCYTES       0                 0.0 - 5.0 %         ABS. NEUTROPHILS            1.7               1.7 - 8.2 K/*       ABS. LYMPHOCYTES            1.9               0.5 - 4.6 K/*       ABS. MONOCYTES              0.5               0.1 - 1.3 K/*       ABS. EOSINOPHILS            0.0               0.0 - 0.8 K/*       ABS. BASOPHILS              0.0               0.0 - 0.2 K/*       ABS. IMM. GRANS. 0.0               0.0 - 0.5 K/*  -METABOLIC PANEL, BASIC       Result                      Value             Ref Range           Sodium                      139               136 - 145 mm*       Potassium                   3.4 (L)           3.5 - 5.1 mm*       Chloride                    104               98 - 107 mmo*       CO2                         29                21 - 32 mmol*       Anion gap                   6 (L)             7 - 16 mmol/L       Glucose                     108 (H)           65 - 100 mg/*       BUN                         19                8 - 23 MG/DL        Creatinine                  1.20 (H)          0.6 - 1.0 MG*       GFR est AA                  59 (L)            >60 ml/min/1*       GFR est non-AA              48 (L)            >60 ml/min/1*       Calcium                     9.5               8.3 - 10.4 M*  -POC PT/INR       Result                      Value             Ref Range           Prothrombin time (POC)      11.7 (H)          9.6 - 11.6 S*       INR (POC)                   1.0               0.9 - 1.2      -GLUCOSE, POC       Result                      Value             Ref Range           Glucose (POC)               105 (H)           65 - 100 mg/*  -EKG, 12 LEAD, INITIAL       Result                      Value             Ref Range           Ventricular Rate            69                BPM                 Atrial Rate                 69                BPM                 P-R Interval                204               ms                  QRS Duration                86                ms                  Q-T Interval                388               ms                  QTC Calculation (Bezet)     415               ms                  Calculated P Axis           58                degrees             Calculated R Axis           70                degrees             Calculated T Axis           51                degrees             Diagnosis !! AGE AND GENDER SPECIFIC ECG ANALYSIS !! Normal sinus rhythm   Normal ECG   When compared with ECG of 15-MAY-2020 12:13,   Sinus rhythm has replaced Junctional rhythm   ST no longer depressed in Inferior leads   T wave inversion no longer evident in Inferior leads   QT has shortened     )  Tests in the radiology section of CPT®: ordered and reviewed (Ct Code Neuro Head Wo Contrast    Result Date: 5/15/2020  EXAMINATION: CT  HEAD 5/15/2020 11:32 AM ACCESSION NUMBER:  [id number] INDICATION:  patient with acute neuro changes, occipital headache, neck pain, dizziness, nausea for about our COMPARISON: Head CT 10/5/2008 TECHNIQUE: Contiguous axial computed tomographic images were obtained of the head without intravenous contrast. Radiation dose reduction techniques were used for this study:  Our CT scanners use one or all of the following: Automated exposure control, adjustment of the mA and/or kVp according to patient's size, iterative reconstruction. FINDINGS: There is no evidence of acute intracranial hemorrhage or extra-axial collections. There is no CT evidence of acute infarction. The ventricles are within normal limits. There is no midline shift. The basilar cisterns are patent. There is no cerebellar tonsillar ectopia or herniation. The paranasal sinuses and mastoid air cells are well aerated and clear. Prior bilateral lens replacement. IMPRESSION: No acute intracranial abnormality.  VOICE DICTATED BY: Dr. Suraj Campbell    )  Tests in the medicine section of CPT®: reviewed and ordered           Procedures

## 2020-05-16 LAB
ANION GAP SERPL CALC-SCNC: 4 MMOL/L (ref 7–16)
BASOPHILS # BLD: 0 K/UL (ref 0–0.2)
BASOPHILS NFR BLD: 0 % (ref 0–2)
BUN SERPL-MCNC: 16 MG/DL (ref 8–23)
CALCIUM SERPL-MCNC: 8.8 MG/DL (ref 8.3–10.4)
CHLORIDE SERPL-SCNC: 107 MMOL/L (ref 98–107)
CHOLEST SERPL-MCNC: 263 MG/DL
CO2 SERPL-SCNC: 30 MMOL/L (ref 21–32)
CREAT SERPL-MCNC: 0.99 MG/DL (ref 0.6–1)
DIFFERENTIAL METHOD BLD: ABNORMAL
EOSINOPHIL # BLD: 0 K/UL (ref 0–0.8)
EOSINOPHIL NFR BLD: 1 % (ref 0.5–7.8)
ERYTHROCYTE [DISTWIDTH] IN BLOOD BY AUTOMATED COUNT: 13.9 % (ref 11.9–14.6)
EST. AVERAGE GLUCOSE BLD GHB EST-MCNC: 120 MG/DL
GLUCOSE BLD STRIP.AUTO-MCNC: 103 MG/DL (ref 65–100)
GLUCOSE BLD STRIP.AUTO-MCNC: 149 MG/DL (ref 65–100)
GLUCOSE BLD STRIP.AUTO-MCNC: 94 MG/DL (ref 65–100)
GLUCOSE BLD STRIP.AUTO-MCNC: 99 MG/DL (ref 65–100)
GLUCOSE SERPL-MCNC: 100 MG/DL (ref 65–100)
HBA1C MFR BLD: 5.8 % (ref 4.8–6)
HCT VFR BLD AUTO: 34.8 % (ref 35.8–46.3)
HDLC SERPL-MCNC: 61 MG/DL (ref 40–60)
HDLC SERPL: 4.3 {RATIO}
HGB BLD-MCNC: 11.3 G/DL (ref 11.7–15.4)
IMM GRANULOCYTES # BLD AUTO: 0 K/UL (ref 0–0.5)
IMM GRANULOCYTES NFR BLD AUTO: 0 % (ref 0–5)
LDLC SERPL CALC-MCNC: 175.6 MG/DL
LIPID PROFILE,FLP: ABNORMAL
LYMPHOCYTES # BLD: 1.8 K/UL (ref 0.5–4.6)
LYMPHOCYTES NFR BLD: 44 % (ref 13–44)
MCH RBC QN AUTO: 27.4 PG (ref 26.1–32.9)
MCHC RBC AUTO-ENTMCNC: 32.5 G/DL (ref 31.4–35)
MCV RBC AUTO: 84.5 FL (ref 79.6–97.8)
MONOCYTES # BLD: 0.5 K/UL (ref 0.1–1.3)
MONOCYTES NFR BLD: 11 % (ref 4–12)
NEUTS SEG # BLD: 1.9 K/UL (ref 1.7–8.2)
NEUTS SEG NFR BLD: 44 % (ref 43–78)
NRBC # BLD: 0 K/UL (ref 0–0.2)
PLATELET # BLD AUTO: 186 K/UL (ref 150–450)
PMV BLD AUTO: 11.1 FL (ref 9.4–12.3)
POTASSIUM SERPL-SCNC: 2.9 MMOL/L (ref 3.5–5.1)
POTASSIUM SERPL-SCNC: 4 MMOL/L (ref 3.5–5.1)
RBC # BLD AUTO: 4.12 M/UL (ref 4.05–5.2)
SODIUM SERPL-SCNC: 141 MMOL/L (ref 136–145)
TRIGL SERPL-MCNC: 132 MG/DL (ref 35–150)
VLDLC SERPL CALC-MCNC: 26.4 MG/DL (ref 6–23)
WBC # BLD AUTO: 4.2 K/UL (ref 4.3–11.1)

## 2020-05-16 PROCEDURE — 97165 OT EVAL LOW COMPLEX 30 MIN: CPT

## 2020-05-16 PROCEDURE — 74011250637 HC RX REV CODE- 250/637: Performed by: HOSPITALIST

## 2020-05-16 PROCEDURE — 84132 ASSAY OF SERUM POTASSIUM: CPT

## 2020-05-16 PROCEDURE — 99218 HC RM OBSERVATION: CPT

## 2020-05-16 PROCEDURE — 36415 COLL VENOUS BLD VENIPUNCTURE: CPT

## 2020-05-16 PROCEDURE — 85025 COMPLETE CBC W/AUTO DIFF WBC: CPT

## 2020-05-16 PROCEDURE — 65660000000 HC RM CCU STEPDOWN

## 2020-05-16 PROCEDURE — 74011250636 HC RX REV CODE- 250/636: Performed by: INTERNAL MEDICINE

## 2020-05-16 PROCEDURE — 80061 LIPID PANEL: CPT

## 2020-05-16 PROCEDURE — 83036 HEMOGLOBIN GLYCOSYLATED A1C: CPT

## 2020-05-16 PROCEDURE — 97161 PT EVAL LOW COMPLEX 20 MIN: CPT | Performed by: PHYSICAL THERAPIST

## 2020-05-16 PROCEDURE — 80048 BASIC METABOLIC PNL TOTAL CA: CPT

## 2020-05-16 PROCEDURE — 99232 SBSQ HOSP IP/OBS MODERATE 35: CPT | Performed by: PSYCHIATRY & NEUROLOGY

## 2020-05-16 PROCEDURE — 82962 GLUCOSE BLOOD TEST: CPT

## 2020-05-16 PROCEDURE — 74011250637 HC RX REV CODE- 250/637: Performed by: INTERNAL MEDICINE

## 2020-05-16 PROCEDURE — 93888 INTRACRANIAL LIMITED STUDY: CPT

## 2020-05-16 PROCEDURE — 93888 INTRACRANIAL LIMITED STUDY: CPT | Performed by: PSYCHIATRY & NEUROLOGY

## 2020-05-16 PROCEDURE — 97116 GAIT TRAINING THERAPY: CPT | Performed by: PHYSICAL THERAPIST

## 2020-05-16 PROCEDURE — 97530 THERAPEUTIC ACTIVITIES: CPT

## 2020-05-16 RX ORDER — LISINOPRIL AND HYDROCHLOROTHIAZIDE 10; 12.5 MG/1; MG/1
1 TABLET ORAL 2 TIMES DAILY
Status: DISCONTINUED | OUTPATIENT
Start: 2020-05-16 | End: 2020-05-17 | Stop reason: HOSPADM

## 2020-05-16 RX ORDER — MECLIZINE HYDROCHLORIDE 25 MG/1
25 TABLET ORAL 3 TIMES DAILY
Status: DISCONTINUED | OUTPATIENT
Start: 2020-05-16 | End: 2020-05-17 | Stop reason: HOSPADM

## 2020-05-16 RX ORDER — GUAIFENESIN 100 MG/5ML
81 LIQUID (ML) ORAL DAILY
Status: DISCONTINUED | OUTPATIENT
Start: 2020-05-17 | End: 2020-05-17 | Stop reason: HOSPADM

## 2020-05-16 RX ORDER — POTASSIUM CHLORIDE 20 MEQ/1
40 TABLET, EXTENDED RELEASE ORAL
Status: COMPLETED | OUTPATIENT
Start: 2020-05-16 | End: 2020-05-16

## 2020-05-16 RX ORDER — POTASSIUM CHLORIDE 20 MEQ/1
40 TABLET, EXTENDED RELEASE ORAL 2 TIMES DAILY
Status: COMPLETED | OUTPATIENT
Start: 2020-05-16 | End: 2020-05-16

## 2020-05-16 RX ADMIN — Medication 10 ML: at 13:21

## 2020-05-16 RX ADMIN — POTASSIUM CHLORIDE 40 MEQ: 20 TABLET, EXTENDED RELEASE ORAL at 09:23

## 2020-05-16 RX ADMIN — MECLIZINE HYDROCHLORIDE 25 MG: 25 TABLET ORAL at 21:01

## 2020-05-16 RX ADMIN — LISINOPRIL AND HYDROCHLOROTHIAZIDE 1 TABLET: 12.5; 1 TABLET ORAL at 09:23

## 2020-05-16 RX ADMIN — Medication 10 ML: at 21:01

## 2020-05-16 RX ADMIN — POTASSIUM CHLORIDE 40 MEQ: 20 TABLET, EXTENDED RELEASE ORAL at 06:20

## 2020-05-16 RX ADMIN — LISINOPRIL AND HYDROCHLOROTHIAZIDE 1 TABLET: 12.5; 1 TABLET ORAL at 17:03

## 2020-05-16 RX ADMIN — MECLIZINE HYDROCHLORIDE 25 MG: 25 TABLET ORAL at 17:03

## 2020-05-16 RX ADMIN — ROSUVASTATIN 40 MG: 20 TABLET, FILM COATED ORAL at 21:01

## 2020-05-16 RX ADMIN — VITAMIN D, TAB 1000IU (100/BT) 1 TABLET: 25 TAB at 09:23

## 2020-05-16 RX ADMIN — POTASSIUM CHLORIDE 40 MEQ: 20 TABLET, EXTENDED RELEASE ORAL at 17:03

## 2020-05-16 RX ADMIN — ACETAMINOPHEN 500 MG: 500 TABLET, FILM COATED ORAL at 09:27

## 2020-05-16 NOTE — PROGRESS NOTES
Problem: Self Care Deficits Care Plan (Adult)  Goal: *Acute Goals and Plan of Care (Insert Text)  Description: 1. Patient will complete lower body bathing and dressing with independence and adaptive equipment as needed. 2. Patient will complete toileting with independence. 3. Patient will tolerate 23 minutes of OT treatment with less than 3 rest breaks to increase activity tolerance for ADLs. 4. Patient will complete functional transfers with independence and adaptive equipment as needed. Timeframe: 7 visits    Outcome: Progressing Towards Goal       OCCUPATIONAL THERAPY: Initial Assessment, Daily Note, and AM 5/16/2020  INPATIENT: OT Visit Days: 1  Payor: Gregorio Cobos / Plan: [address] HMO / Product Type: Managed Care Medicare /      NAME/AGE/GENDER: Flaco Anderson is a 60 y.o. female   PRIMARY DIAGNOSIS:  Hypertensive emergency [I16.1] Hypertensive emergency Hypertensive emergency        ICD-10: Treatment Diagnosis:    Generalized Muscle Weakness (M62.81)   Precautions/Allergies:     Codeine      ASSESSMENT:     Ms. Luis Pennington presents to hospital for above. Pt lives with spouse in a one-level home, where she is typically independent with ADLs/IADLs, including driving and working part time for [employer]. Pt denies use of AD/DME at baseline for functional mobility; pt endorses 0 falls in the last 6 months. Today, pt is found supine in bed upon arrival, AOx4, and agreeable to OT evaluation. Per BUE screen, AROM, strength, and coordination are WNL. Per visual screen, acuity intact. Tracking impaired by nystagmus. Pt becomes dizzy and nauseated with tracking activities. MD notified. Pt able to move supine to sit with mod I, demonstrating intact sitting balance at EOB. Pt completes STS with CGA and completes functional mobility within room with CGA and RW, demonstrating fair balance in standing. Pt left with possessions in reach and all needs met.  Ms. Luis Pennington presents with functional limitations listed below and appears to be functioning below baseline. They will benefit from continued skilled OT services to maximize safety and independence with ADLs. Will follow during acute stay. This section established at most recent assessment   PROBLEM LIST (Impairments causing functional limitations):  Decreased Strength  Decreased Transfer Abilities  Decreased Balance  Decreased Activity Tolerance   INTERVENTIONS PLANNED: (Benefits and precautions of occupational therapy have been discussed with the patient.)  Activities of daily living training  Adaptive equipment training  Balance training  Therapeutic activity  Therapeutic exercise     TREATMENT PLAN: Frequency/Duration: Follow patient 3x/week to address above goals. Rehabilitation Potential For Stated Goals: Excellent     REHAB RECOMMENDATIONS (at time of discharge pending progress):    Placement: It is my opinion, based on this patient's performance to date, that Ms. Renner may benefit from OUTPATIENT THERAPY after discharge due to the functional deficits listed above that are likely to improve with skilled rehabilitation because because his/her necessity for specific vertigo therapeutic intervention. Equipment:   None at this time              OCCUPATIONAL PROFILE AND HISTORY:   History of Present Injury/Illness (Reason for Referral):  See H&P  Past Medical History/Comorbidities:   Ms. Nat Aragon  has a past medical history of Congestive heart failure ([address] Diabetic polyneuropathy associated with type 2 diabetes mellitus ([address] (5/14/2019), Dyslipidemia, Neuropathy (5/14/2019), Numbness of right foot (5/14/2019), OA (osteoarthritis), Peripheral vascular disease ([address] (5/14/2019), Shingles (2019), Sleep apnea, and Stroke ([address] Ms. Nat Aragon  has a past surgical history that includes hx colonoscopy; hx hysterectomy (1989); hx cyst incision and drainage (1975); and colonoscopy (N/A, 2/14/2018).   Social History/Living Environment:      Prior Level of Function/Work/Activity:  Independent with ADLs/IADLs. Drives. Works. Personal Factors:          Sex:  female        Age:  60 y.o. Number of Personal Factors/Comorbidities that affect the Plan of Care: Expanded review of therapy/medical records (1-2):  MODERATE COMPLEXITY   ASSESSMENT OF OCCUPATIONAL PERFORMANCE[de-identified]   Activities of Daily Living:   Basic ADLs (From Assessment) Complex ADLs (From Assessment)   Feeding: Independent  Oral Facial Hygiene/Grooming: Independent  Bathing: Stand-by assistance  Upper Body Dressing: Independent  Lower Body Dressing: Contact guard assistance  Toileting: Contact guard assistance     Grooming/Bathing/Dressing Activities of Daily Living     Cognitive Retraining  Safety/Judgement: Awareness of environment                       Bed/Mat Mobility  Rolling: Modified independent  Supine to Sit: Modified independent  Sit to Stand: Contact guard assistance  Stand to Sit: Contact guard assistance  Bed to Chair: Contact guard assistance  Scooting: Modified independent     Most Recent Physical Functioning:   Gross Assessment:  AROM: Within functional limits  Strength:  Within functional limits  Coordination: Within functional limits               Posture:     Balance:  Sitting: Intact  Standing: Impaired  Standing - Static: Good;Fair  Standing - Dynamic : Fair Bed Mobility:  Rolling: Modified independent  Supine to Sit: Modified independent  Scooting: Modified independent  Wheelchair Mobility:     Transfers:  Sit to Stand: Contact guard assistance  Stand to Sit: Contact guard assistance  Bed to Chair: Contact guard assistance            Patient Vitals for the past 6 hrs:   BP BP Patient Position SpO2 Pulse   05/16/20 0800 118/73 At rest 98 % (!) 58   05/16/20 0923 140/68 -- -- 62   05/16/20 1200 123/79 Supine 99 % 63       Mental Status  Neurologic State: Alert  Orientation Level: Oriented X4  Cognition: Follows commands  Perception: Appears intact  Perseveration: No perseveration noted  Safety/Judgement: Awareness of environment                          Physical Skills Involved:  Balance  Strength  Activity Tolerance Cognitive Skills Affected (resulting in the inability to perform in a timely and safe manner):  N/a  Psychosocial Skills Affected:  Habits/Routines  Social Roles   Number of elements that affect the Plan of Care: 3-5:  MODERATE COMPLEXITY   CLINICAL DECISION MAKIN[address] [place] [address] AM-PAC 6 Clicks   Daily Activity Inpatient Short Form  How much help from another person does the patient currently need. .. Total A Lot A Little None   1. Putting on and taking off regular lower body clothing? [] 1   [] 2   [] 3   [x] 4   2. Bathing (including washing, rinsing, drying)? [] 1   [] 2   [x] 3   [] 4   3. Toileting, which includes using toilet, bedpan or urinal?   [] 1   [] 2   [] 3   [x] 4   4. Putting on and taking off regular upper body clothing? [] 1   [] 2   [] 3   [x] 4   5. Taking care of personal grooming such as brushing teeth? [] 1   [] 2   [] 3   [x] 4   6. Eating meals? [] 1   [] 2   [] 3   [x] 4   © , Trustees of [address] [place] [address], under license to [employer]. All rights reserved      Score:  Initial: 23 Most Recent: X (Date: -- )    Interpretation of Tool:  Represents activities that are increasingly more difficult (i.e. Bed mobility, Transfers, Gait). Medical Necessity:     Patient demonstrates   excellent   rehab potential due to higher previous functional level. Reason for Services/Other Comments:  Patient continues to require skilled intervention due to   medical complications  .    Use of outcome tool(s) and clinical judgement create a POC that gives a: LOW COMPLEXITY         TREATMENT:   (In addition to Assessment/Re-Assessment sessions the following treatments were rendered)     Pre-treatment Symptoms/Complaints:    Pain: Initial:   Pain Intensity 1: 8  Pain Location 1: Head  Pain Intervention(s) 1: Medication (see MAR)  Post Session: same, RN aware     Therapeutic Activity: (    25 minutes): Therapeutic activities including Bed transfers, Chair transfers, Ambulation on level ground, and visual tracking/balance activities to improve mobility and balance. Required minimal   to promote dynamic balance in standing. Braces/Orthotics/Lines/Etc:   O2 Device: Room air  Treatment/Session Assessment:    Response to Treatment:  tolerated well  Interdisciplinary Collaboration:   Occupational Therapist  Registered Nurse  Physician  After treatment position/precautions:   Up in chair  Bed/Chair-wheels locked  Call light within reach  RN notified  MD at bedside   Compliance with Program/Exercises: Compliant all of the time, Will assess as treatment progresses. Recommendations/Intent for next treatment session: \"Next visit will focus on advancements to more challenging activities and reduction in assistance provided\".   Total Treatment Duration:  OT Patient Time In/Time Out  Time In: 2459  Time Out: R [place] 21

## 2020-05-16 NOTE — PROGRESS NOTES
Neurology Daily Progress Note     Assessment:     Malignant HTN. She was seen for code S yesterday, who presented with sudden onset of occipital hA, dizziness, blurred vision, nausea and vomiting with /110 on arrival. CTA of head/neck negative for LVO or high grade stenosis. MRI of brain negative for acute intracranial abnormality. TTE negative for PFO or atrial enlargement. Continues to endorse dizziness this AM, likely result of fluctuation in blood pressure. K 2.9 this AM. Recommend repleting electrolytes. No additional neurological work up is needed at this time. Will sign off. Plan:     · Replete electrolytes  · Continue ASA 81 mg daily   · Continue high intensity statin   · Telemetry   · PT/OT/ST  · DVT prophylaxis   · BP management - normotensive, with long-term goal <130/80  · Smoking cessation if applicable   · Diabetes education if applicable   · Depression Screening prior to discharge      Subjective: Interval history:    Patient resting in bed. Continues to endorse dizziness, increased with positional changes. Denies blurred vision, HA, N/V this AM. BP stable overnight. CTA of head/neck negative for LVO or high grade stenosis. MRI of brain negative for acute intracranial abnormality. TTE negative for PFO or atrial enlargement. K 2.9 this AM. Recommend repleting electrolytes. History:    Markus Haque is a 60 y.o. female who is being seen for Code S. Review of systems negative with exception of pertinent positives and negatives noted above.        Objective:     Vitals:    05/16/20 0324 05/16/20 0409 05/16/20 0800 05/16/20 0923   BP: 136/68 126/53 118/73 140/68   Pulse: 75 (!) 54 (!) 58 62   Resp: 16 16 18    Temp: 98.4 °F (36.9 °C) 97.7 °F (36.5 °C) 97.4 °F (36.3 °C)    SpO2: 97% 93% 98%    Weight:       Height:              Current Facility-Administered Medications:     potassium chloride (K-DUR, KLOR-CON) SR tablet 40 mEq, 40 mEq, Oral, BID, Ciesco, Tony, DO, 40 mEq at 05/16/20 0923    lisinopril-hydroCHLOROthiazide (PRINZIDE, ZESTORETIC) 10-12.5 mg per tablet 1 Tab, 1 Tab, Oral, BID, Ciesco, Tony, DO, 1 Tab at 05/16/20 9306    sodium chloride (NS) flush 5-40 mL, 5-40 mL, IntraVENous, Q8H, Ciesco, Tony, DO, Stopped at 05/16/20 0600    sodium chloride (NS) flush 5-40 mL, 5-40 mL, IntraVENous, PRN, Ciesco, Tony, DO    LORazepam (ATIVAN) injection 1 mg, 1 mg, IntraVENous, ONCE PRN, Ciesco, Tony, DO    cholecalciferol (VITAMIN D3) (1000 Units /25 mcg) tablet 1 Tab, 25 mcg, Oral, DAILY, Ciesco, Tony, DO, 1 Tab at 05/16/20 0923    insulin lispro (HUMALOG) injection 0-10 Units, 0-10 Units, SubCUTAneous, AC&HS, Ciesco, Tony, DO, Stopped at 05/15/20 1634    rosuvastatin (CRESTOR) tablet 40 mg, 40 mg, Oral, QHS, Ciesco, Tony, DO, 40 mg at 05/15/20 2123    acetaminophen (TYLENOL) tablet 500 mg, 500 mg, Oral, Q6H PRN, Ciesco, Tony, DO, 500 mg at 05/16/20 3801    Recent Results (from the past 12 hour(s))   HEMOGLOBIN A1C WITH EAG    Collection Time: 05/16/20  5:01 AM   Result Value Ref Range    Hemoglobin A1c 5.8 4.8 - 6.0 %    Est. average glucose 710 mg/dL   METABOLIC PANEL, BASIC    Collection Time: 05/16/20  5:01 AM   Result Value Ref Range    Sodium 141 136 - 145 mmol/L    Potassium 2.9 (LL) 3.5 - 5.1 mmol/L    Chloride 107 98 - 107 mmol/L    CO2 30 21 - 32 mmol/L    Anion gap 4 (L) 7 - 16 mmol/L    Glucose 100 65 - 100 mg/dL    BUN 16 8 - 23 MG/DL    Creatinine 0.99 0.6 - 1.0 MG/DL    GFR est AA >60 >60 ml/min/1.73m2    GFR est non-AA >60 >60 ml/min/1.73m2    Calcium 8.8 8.3 - 10.4 MG/DL   CBC WITH AUTOMATED DIFF    Collection Time: 05/16/20  5:01 AM   Result Value Ref Range    WBC 4.2 (L) 4.3 - 11.1 K/uL    RBC 4.12 4.05 - 5.2 M/uL    HGB 11.3 (L) 11.7 - 15.4 g/dL    HCT 34.8 (L) 35.8 - 46.3 %    MCV 84.5 79.6 - 97.8 FL    MCH 27.4 26.1 - 32.9 PG    MCHC 32.5 31.4 - 35.0 g/dL    RDW 13.9 11.9 - 14.6 %    PLATELET 927 959 - 731 K/uL    MPV 11.1 9.4 - 12.3 FL    ABSOLUTE NRBC 0.00 0.0 - 0.2 K/uL    DF AUTOMATED      NEUTROPHILS 44 43 - 78 %    LYMPHOCYTES 44 13 - 44 %    MONOCYTES 11 4.0 - 12.0 %    EOSINOPHILS 1 0.5 - 7.8 %    BASOPHILS 0 0.0 - 2.0 %    IMMATURE GRANULOCYTES 0 0.0 - 5.0 %    ABS. NEUTROPHILS 1.9 1.7 - 8.2 K/UL    ABS. LYMPHOCYTES 1.8 0.5 - 4.6 K/UL    ABS. MONOCYTES 0.5 0.1 - 1.3 K/UL    ABS. EOSINOPHILS 0.0 0.0 - 0.8 K/UL    ABS. BASOPHILS 0.0 0.0 - 0.2 K/UL    ABS. IMM. GRANS. 0.0 0.0 - 0.5 K/UL   LIPID PANEL    Collection Time: 05/16/20  5:01 AM   Result Value Ref Range    LIPID PROFILE          Cholesterol, total 263 (H) <200 MG/DL    Triglyceride 132 35 - 150 MG/DL    HDL Cholesterol 61 (H) 40 - 60 MG/DL    LDL, calculated 175.6 (H) <100 MG/DL    VLDL, calculated 26.4 (H) 6.0 - 23.0 MG/DL    CHOL/HDL Ratio 4.3     GLUCOSE, POC    Collection Time: 05/16/20  8:23 AM   Result Value Ref Range    Glucose (POC) 149 (H) 65 - 100 mg/dL     MRI Results (most recent):  Results from [place] encounter on 05/15/20   MRI BRAIN WO CONT    Narrative EXAMINATION: BRAIN MRI 5/15/2020 2:21 PM    ACCESSION NUMBER: [id number]    INDICATION: occipital HA, n/v, ? PRES v Stroke    COMPARISON: Head CT and CTA head and neck 5/15/2020    TECHNIQUE: Multiplanar multisequence MRI of the brain without the administration  of intravenous contrast.    FINDINGS:    There are a few nonspecific punctate T2 hyperintensities scattered throughout  the periventricular and subcortical white matter. Midline structures including the corpus callosum, pituitary gland, optic nerves,  and cerebellum are well developed. The ventricles are within normal limits. There is no midline shift. The basilar  cisterns are patent. There is no cerebellar tonsillar ectopia or herniation. Diffusion imaging shows no evidence of acute infarction or other acute  abnormality. There is patchy T1 hypointensity throughout the visualized bone marrow elements.   This is a nonspecific finding which can be seen with smoking, obesity, anemia,  or chronic disease. Impression IMPRESSION:    1. No evidence of acute infarction. 2. Mild burden of chronic microangiopathy. VOICE DICTATED BY: Dr. Vannessa Choudhary         Physical Exam:  General - Well developed, well nourished, in no apparent distress. Pleasant and conversent. HEENT - Normocephalic, atraumatic. Conjunctiva are clear. Neck - Supple without masses  Cardiovascular - Regular rate and rhythm. Normal S1, S2 without murmurs, rubs, or gallops. Lungs - Clear to auscultation. Abdomen - Soft, nontender with normal bowel sounds. Extremities - Peripheral pulses intact. No edema and no rashes. Neurological examination - Comprehension, attention, memory and reasoning are intact. Language and speech are normal.   On cranial nerve examination, (II, III, IV, VI) pupils are equal, round, and reactive to light. Visual acuity is adequate. Visual fields are full to finger confrontation. Extraocular motility is normal. (V, VII) Face is symmetric and sensation is intact to light touch. (VIII) Hearing is intact. (IX, X) Palate and uvula elevate symmetrically. Voice is normal. (XI) Shoulder shrug is strong and equal bilaterally. (XII)Tongue is midline. Motor examination - There is normal muscle tone and bulk. Power is full throughout, RLE drift. Muscle stretch reflexes are normoactive and there are no pathological reflexes present. Plantar response is flexor. Sensation is intact to light touch, pinprick, vibration and proprioception in all extremities. Cerebellar examination is normal.     Signed By: Karyle Locker, NP     May 16, 2020    Neurology attending    Patient is stable looks good this morning with subjective degree of dizziness. We did not ambulate the patient. Blood pressure control is improved.     Unclear if in the past has had primary investigations for secondary hypertension but this may be a prudent idea given degree of hypertensive urgency at the time she presented yesterday I will leave this to hospitalists

## 2020-05-16 NOTE — PROGRESS NOTES
CM received consult to assist with discharge planning. CM met with patient to complete assessment. Patient presented alert and oriented. Demographic information confirmed by the patient. The patient lives with her spouse in a one level home, with 3 steps at the entrance. Patient states he is independent with completing ADL's. The patient is currently employed with [employer]. The patient receives her medications from [facility] on [address] Patient voiced no difficulty with obtaining medications in the community. The patient confirmed no  services. PT/OT/Speech have been consulted. The patient states she would like to return home with when medically stable. However, the patient is open to obtaining outpatient therapy services if recommended. Patient states she has completed outpatient therapy in the past with Jairo Kennedy to assist with vertigo. CM was receptive to this information. Patient states she will likely need a rolling walker upon discharge. CM will notify week staff, to assist with this need following discharge. CM continues to follow the patient during this hospitalization to assist with discharge planning. I have confirmed that the patient has the capacity at home to be able to do a virtual visit with their PCP. Care Management Interventions  PCP Verified by CM: Yes  Mode of Transport at Discharge:  Other (see comment)(TBD: based upon need)  Transition of Care Consult (CM Consult): Discharge Planning  Discharge Durable Medical Equipment: No(Patient confirmed no DME. )  Physical Therapy Consult: Yes  Occupational Therapy Consult: Yes  Speech Therapy Consult: Yes  Current Support Network: Own Home, Lives with Spouse(The patient lives with her spouse Anne Stratton [phone] in a one level home. )  Confirm Follow Up Transport: [address] Provided?: No  Discharge Location  Discharge Placement: Unable to determine at this time

## 2020-05-16 NOTE — PROGRESS NOTES
Reviewed notes for spiritual concerns prior to visit  Visit with patient to build rapport with . Calm  Encouraged with presence and words of hope    Patient demonstrates confidence in the care team.  Appears to be coping with illness.     No physical contact with patient per guidelines  Assurance of ongoing prayers      Gabriela Howard,  Staff   C: [phone]  /  [email or web address]

## 2020-05-16 NOTE — PROGRESS NOTES
Problem: Falls - Risk of  Goal: *Absence of Falls  Description: Document Chevy Severino Fall Risk and appropriate interventions in the flowsheet.   Outcome: Progressing Towards Goal  Note: Fall Risk Interventions:  Mobility Interventions: Bed/chair exit alarm, Communicate number of staff needed for ambulation/transfer         Medication Interventions: Assess postural VS orthostatic hypotension, Bed/chair exit alarm, Evaluate medications/consider consulting pharmacy, Teach patient to arise slowly    Elimination Interventions: Call light in reach, Bed/chair exit alarm, Toileting schedule/hourly rounds, Toilet paper/wipes in reach              Problem: Patient Education: Go to Patient Education Activity  Goal: Patient/Family Education  Outcome: Progressing Towards Goal

## 2020-05-16 NOTE — PROGRESS NOTES
PT Note    Orders received, chart reviewed. Attempted to see patient this morning but she is currently working with another service. Will attempt again pending scheduling and patient status.      Conor Foster, PT

## 2020-05-16 NOTE — PROGRESS NOTES
05/16/20 0658   NIH Stroke Scale   Interval Other (comment)  (dual NIH with Damien Peña RN )   LOC 0   LOC Questions 0   LOC Commands 0   Best Gaze 0   Visual 0   Facial Palsy 0   Motor Right Arm 0   Motor Left Arm 0   Motor Right Leg 1   Motor Left Leg 0   Limb Ataxia 0   Sensory 0   Best Language 0   Dysarthria 0   Extinction and Inattention 0   Total 1

## 2020-05-16 NOTE — PROGRESS NOTES
Problem: Mobility Impaired (Adult and Pediatric)  Goal: *Acute Goals and Plan of Care (Insert Text)  Description:     LTG:  (1.)Ms. Niki Alvarez will move from supine to sit and sit to supine , scoot up and down and roll side to side in bed with INDEPENDENCE within 7 treatment day(s). (2.)Ms. Niki Alvarez will transfer from bed to chair and chair to bed with MODIFIED INDEPENDENCE using the least restrictive device within 7 treatment day(s). (3.)Ms. Renner will ambulate with MODIFIED INDEPENDENCE for 1000 feet with the least restrictive device within 7 treatment day(s). (4.)Ms. Niki Alvarez will climb at least 2 steps with modified independence with the least restrictive device within 7 treatment days. ________________________________________________________________________________________________     Outcome: Progressing Towards Goal     PHYSICAL THERAPY: Initial Assessment, Daily Note, and PM 5/16/2020  INPATIENT: PT Visit Days : 1  Payor: Ezekiel Rodas / Plan: [address] [place] [address] HMO / Product Type: [employer] /       NAME/AGE/GENDER: Bill Santos is a 60 y.o. female   PRIMARY DIAGNOSIS: Hypertensive emergency [I16.1] Hypertensive emergency Hypertensive emergency        ICD-10: Treatment Diagnosis:    Other lack of cordination (R27.8)  Difficulty in walking, Not elsewhere classified (R26.2)  Other abnormalities of gait and mobility (R26.89)   Precaution/Allergies:  Codeine      ASSESSMENT:     Ms. Niki Alvarez is a 60year old F who presents for hypertensive emergency. Prior to hospital admission pt lives with her  who helps her with washing her back in 1 story home with 2 step(s) to enter. Pt endorses no falls in past 6 months. Prior to admission Ms. Renner uses nothing for mobility. Upon entering, pt sitting EOB, agreeable to PT evaluation. she reports 0 pain in  at rest. BLE assessment indicates sensation to light touch intact, AROM WNL, and strength 4/5 bilateral LE.  Pt sitting EOB with good static sitting balance control. Sit > stand with CGA. During gait, pnt noted to be mildly unsteady on feet w/ uneven step length and trunk sway increased. Coordination and walking speed were improved with the addition of a RW. Gait x 1000 ft with RW, cues for turning w/ walker, posture, and foot clearance. At end of session pt up in bedside chair with all needs within reach. Pt presents as functioning below her baseline, with deficits in mobility including transfers, gait, balance, and activity tolerance. Pt will benefit from skilled therapy services to address stated deficits to promote return to highest level of function, independence, and safety. Will continue to follow. This section established at most recent assessment   PROBLEM LIST (Impairments causing functional limitations):  Decreased ADL/Functional Activities  Decreased Transfer Abilities  Decreased Ambulation Ability/Technique  Decreased Balance   INTERVENTIONS PLANNED: (Benefits and precautions of physical therapy have been discussed with the patient.)  Balance Exercise  Bed Mobility  Gait Training  Neuromuscular Re-education/Strengthening  Range of Motion (ROM)  Therapeutic Activites  Therapeutic Exercise/Strengthening  Transfer Training     TREATMENT PLAN: Frequency/Duration: 3 times a week for duration of hospital stay  Rehabilitation Potential For Stated Goals: Excellent     REHAB RECOMMENDATIONS (at time of discharge pending progress):    Placement: It is my opinion, based on this patient's performance to date, that Ms. Niki Alvarez may benefit from being discharged with NO further skilled therapy due to the high likelihood of returning to baseline. Equipment:   Walkers, Type: Rolling Walker              HISTORY:   History of Present Injury/Illness (Reason for Referral):  61 yo AAF with past history of prior CVA, HLD, HTN, and DM type II presents with a 2-day history of worsening dizziness, headache, and nausea without vomiting.  She says her symptoms have been intermittent and \"come and go\" but is worsened \"whenever I bent forward. \" She is involved with patient care and has been trying to [facility] at a distant object to make my symptoms go away. \" Due to increasing severity of her symptoms she called EMS. She denies chest pain, shortness of breath, or abdominal pain. She describes her headaches as \"tension headaches across the front that go to the back of my head. \" No associated blurry vision, difficulty with speech/swallowing. Past Medical History/Comorbidities:   Ms. Sabine Lipscomb  has a past medical history of Congestive heart failure ([facility] [address] Diabetic polyneuropathy associated with type 2 diabetes mellitus ([facility] [address] (5/14/2019), Dyslipidemia, Neuropathy (5/14/2019), Numbness of right foot (5/14/2019), OA (osteoarthritis), Peripheral vascular disease ([address] (5/14/2019), Shingles (2019), Sleep apnea, and Stroke ([facility] [address] Ms. Sabine Lipscomb  has a past surgical history that includes hx colonoscopy; hx hysterectomy (1989); hx cyst incision and drainage (1975); and colonoscopy (N/A, 2/14/2018). Social History/Living Environment:      Prior Level of Function/Work/Activity:  Independent Ambulator. Drives and works as caregiver. Requires help with washing her back from her  and has mild lingering right sided weakness since CVA in 2007     Number of Personal Factors/Comorbidities that affect the Plan of Care: 1-2: MODERATE COMPLEXITY   EXAMINATION:   Most Recent Physical Functioning:   Gross Assessment:  AROM: Within functional limits  PROM: Within functional limits  Strength:  Within functional limits  Coordination: Generally decreased, functional               Posture:     Balance:  Sitting: Intact  Standing: Impaired  Standing - Static: Good;Fair  Standing - Dynamic : Fair Bed Mobility:     Wheelchair Mobility:     Transfers:  Sit to Stand: Contact guard assistance  Stand to Sit: Contact guard assistance  Gait:     Base of Support: Widened  Speed/Marita: Slow  Gait Abnormalities: Trunk sway increased;Decreased step clearance; Path deviations  Distance (ft): 1000 Feet (ft)  Assistive Device: Walker, rolling;Gait belt  Ambulation - Level of Assistance: Contact guard assistance  Duration: 15 Minutes      Body Structures Involved:  Bones  Joints  Muscles Body Functions Affected:  Neuromusculoskeletal  Movement Related Activities and Participation Affected: Mobility  Self Care  Domestic Life  Interpersonal Interactions and Relationships  Community, Social and [place]   Number of elements that affect the Plan of Care: 4+: HIGH COMPLEXITY   CLINICAL PRESENTATION:   Presentation: Stable and uncomplicated: LOW COMPLEXITY   CLINICAL DECISION MAKIN [facility] Inpatient Short Form  How much difficulty does the patient currently have. .. Unable A Lot A Little None   1. Turning over in bed (including adjusting bedclothes, sheets and blankets)? [] 1   [] 2   [] 3   [x] 4   2. Sitting down on and standing up from a chair with arms ( e.g., wheelchair, bedside commode, etc.)   [] 1   [] 2   [x] 3   [] 4   3. Moving from lying on back to sitting on the side of the bed? [] 1   [] 2   [x] 3   [] 4   How much help from another person does the patient currently need. .. Total A Lot A Little None   4. Moving to and from a bed to a chair (including a wheelchair)? [] 1   [] 2   [x] 3   [] 4   5. Need to walk in hospital room? [] 1   [] 2   [x] 3   [] 4   6. Climbing 3-5 steps with a railing? [] 1   [] 2   [x] 3   [] 4   © , Trustees of [address] [place] [address] [address] [address], under license to [email or web address]. All rights reserved      Score:  Initial: 19 Most Recent: X (Date: -- )    Interpretation of Tool:  Represents activities that are increasingly more difficult (i.e. Bed mobility, Transfers, Gait). Medical Necessity:     Skilled intervention continues to be required due to decreased balance during ambulation.   Reason for Services/Other Comments:     Use of outcome tool(s) and clinical judgement create a POC that gives a: Clear prediction of patient's progress: LOW COMPLEXITY            TREATMENT:   (In addition to Assessment/Re-Assessment sessions the following treatments were rendered)   Pre-treatment Symptoms/Complaints:  \"I'm feeling a lot better\"  Pain: Initial:      Post Session:       Gait Training (15 Minutes):  Gait training to improve and/or restore physical functioning as related to mobility, balance, and coordination. Ambulated 1000 Feet (ft) with Contact guard assistance using a Walker, rolling;Gait belt and required verbal and manual cues to improve stride symmetry and walker use. Braces/Orthotics/Lines/Etc:   O2 Device: Room air  Treatment/Session Assessment:    Response to Treatment:  mild-moderate fatigue  Interdisciplinary Collaboration:   Physical Therapist  Registered Nurse  After treatment position/precautions:   Up in chair  Bed/Chair-wheels locked  Bed in low position  Call light within reach   Compliance with Program/Exercises: Will assess as treatment progresses  Recommendations/Intent for next treatment session: \"Next visit will focus on advancements to more challenging activities\".   Total Treatment Duration:  PT Patient Time In/Time Out  Time In: 1237  Time Out: 1300 [address] [facility]

## 2020-05-16 NOTE — PROGRESS NOTES
05/16/20 1859   NIH Stroke Scale   Interval Other (comment)  (NIH with HEMA Rider )   LOC 0   LOC Questions 0   LOC Commands 0   Best Gaze 0   Visual 0   Facial Palsy 0   Motor Right Arm 0   Motor Left Arm 0   Motor Right Leg 1   Motor Left Leg 0   Limb Ataxia 0   Sensory 0   Best Language 0   Dysarthria 0   Extinction and Inattention 0   Total 1

## 2020-05-16 NOTE — PROGRESS NOTES
Hospitalist Progress Note    2020  Admit Date: 5/15/2020 11:15 AM   NAME: Kristyn Hernandez   :  1955   MRN:  [id number]   Attending: Britta Guerra DO  PCP:  Jaci Logan NP    SUBJECTIVE:   61 yo AAF with past history of prior CVA, HLD, HTN, and DM type II presents with a 2-day history of worsening dizziness, headache, and nausea without vomiting. She says her symptoms have been intermittent and \"come and go\" but is worsened \"whenever I bent forward. \" She is involved with patient care and has been trying to [facility] at a distant object to make my symptoms go away. \" Due to increasing severity of her symptoms she called EMS. She denies chest pain, shortness of breath, or abdominal pain. She describes her headaches as \"tension headaches across the front that go to the back of my head. \" No associated blurry vision, difficulty with speech/swallowing.      ED Course: noted SBPs in the 240s. Code S called. NIH of 0. Neurology consulted. Not a tPA candidate due to low NIH. CT head negative. Etiology thought likely CVA vs PRES. Started on Cardene gtt but had to be stopped as SBPs fell to the 130s. Hospitalist called for admission. Interval History (): patient examined at bedside. No acute overnight events. Still having RLE weakness and dizziness that worsens when she stands. Dizziness also \"much worse when I move my head around. \" Dizziness feels like the room is spinning. No fevers/chills, chest pain, shortness of breath, or abdominal pain. No other new symptoms when directly asked.      Review of Systems negative with exception of pertinent positives noted above  PHYSICAL EXAM     Visit Vitals  /79 (BP 1 Location: Right arm, BP Patient Position: Supine)   Pulse 63   Temp 98.3 °F (36.8 °C)   Resp 18   Ht 5' 4\" (1.626 m)   Wt 104.3 kg (230 lb)   SpO2 99%   BMI 39.48 kg/m²      Temp (24hrs), Av.8 °F (36.6 °C), Min:97.4 °F (36.3 °C), Max:98.4 °F (36.9 °C)    Oxygen Therapy  O2 Sat (%): 99 % (05/16/20 1200)  Pulse via Oximetry: 75 beats per minute (05/15/20 1235)  O2 Device: Room air (05/15/20 1126)  No intake or output data in the 24 hours ending 05/16/20 1233   General:          Alert, cooperative, no distress, appears stated age. Head:               Normocephalic, without obvious abnormality, atraumatic. Nose:               Nares normal. No drainage or sinus tenderness. Lungs:             Clear to auscultation bilaterally. No Wheezing or Rhonchi. No rales. Heart:              Regular rate and rhythm,  no murmur, rub or gallop. Abdomen:        Soft, non-tender. Not distended. Bowel sounds normal.   Extremities:     No cyanosis. No edema. No clubbing  Skin:                Texture, turgor normal. No rashes or lesions. Not Jaundiced  Neurologic:      Alert and oriented x3. +4/5 muscle strength on RLE extension compared to left. Sensation to light touch intact b/l. CN II thru XII intact b/l.  Finger-to-nose testing normal.      ASSESSMENT      Active Hospital Problems    Diagnosis Date Noted    Hypertensive emergency 05/15/2020    [facility] (BMI >= 40) ([facility]) 08/06/2019    Peripheral vascular disease ([facility] 05/14/2019    Type 2 diabetes mellitus ([facility] 10/31/2017    Dyslipidemia     History of stroke     Hypertension      Diagnosed at the age of 50       Plan:    # Hypertensive emergency with stroke-like symptoms  - NIH of 0 on presentation, not tPA candidate   - CT head and CTA head unremarkable  - MRI head negative for CVA  - neurology consulted and following  - Cardene gtt stopped  - restart home lisinopril/HCTZ  - hold atenolol   - start ASA 81 mg  - high intensity statin  - secondary risk stratification with HgbA1c and lipid panel  - PT/OT/speech therapy consults  - continuous neuro checks  - telemetry ordered     # DM type II  - hold home metformin  - Humalog SSI and serial CBGs  - goal CBGs between 140-180     # HLD   - repeat LDL of 175, patient already on Crestor 40 mg  - patient claims she takes her mediation as prescribed  - if continued difficulty with controlling her lipids, would recommend adding ezetimibe to current regimen     F/E/N: no fluids, replete electrolytes as needed, diabetic diet     Ppx: SCDs for VTE     Code Status: FULL CODE     Disposition: pending clinical improvement with plan as above. Anticipate discharge in next 24-48 hours. PT/OT consults. Discussed with patient at bedside and daughter via phone. All questions answered.      Signed By: Dennis Mccollum DO     May 16, 2020

## 2020-05-17 ENCOUNTER — HOME HEALTH ADMISSION (OUTPATIENT)
Dept: HOME HEALTH SERVICES | Facility: HOME HEALTH | Age: 65
End: 2020-05-17
Payer: COMMERCIAL

## 2020-05-17 VITALS
SYSTOLIC BLOOD PRESSURE: 129 MMHG | HEIGHT: 64 IN | TEMPERATURE: 98 F | WEIGHT: 230.1 LBS | HEART RATE: 86 BPM | DIASTOLIC BLOOD PRESSURE: 83 MMHG | OXYGEN SATURATION: 100 % | RESPIRATION RATE: 20 BRPM | BODY MASS INDEX: 39.28 KG/M2

## 2020-05-17 LAB
ANION GAP SERPL CALC-SCNC: 5 MMOL/L (ref 7–16)
BASOPHILS # BLD: 0 K/UL (ref 0–0.2)
BASOPHILS NFR BLD: 0 % (ref 0–2)
BUN SERPL-MCNC: 21 MG/DL (ref 8–23)
CALCIUM SERPL-MCNC: 8.8 MG/DL (ref 8.3–10.4)
CHLORIDE SERPL-SCNC: 107 MMOL/L (ref 98–107)
CO2 SERPL-SCNC: 28 MMOL/L (ref 21–32)
CREAT SERPL-MCNC: 1.12 MG/DL (ref 0.6–1)
DIFFERENTIAL METHOD BLD: ABNORMAL
EOSINOPHIL # BLD: 0 K/UL (ref 0–0.8)
EOSINOPHIL NFR BLD: 1 % (ref 0.5–7.8)
ERYTHROCYTE [DISTWIDTH] IN BLOOD BY AUTOMATED COUNT: 14.1 % (ref 11.9–14.6)
GLUCOSE BLD STRIP.AUTO-MCNC: 98 MG/DL (ref 65–100)
GLUCOSE SERPL-MCNC: 97 MG/DL (ref 65–100)
HCT VFR BLD AUTO: 35 % (ref 35.8–46.3)
HGB BLD-MCNC: 11.4 G/DL (ref 11.7–15.4)
IMM GRANULOCYTES # BLD AUTO: 0 K/UL (ref 0–0.5)
IMM GRANULOCYTES NFR BLD AUTO: 0 % (ref 0–5)
LYMPHOCYTES # BLD: 1.8 K/UL (ref 0.5–4.6)
LYMPHOCYTES NFR BLD: 41 % (ref 13–44)
MCH RBC QN AUTO: 27.6 PG (ref 26.1–32.9)
MCHC RBC AUTO-ENTMCNC: 32.6 G/DL (ref 31.4–35)
MCV RBC AUTO: 84.7 FL (ref 79.6–97.8)
MONOCYTES # BLD: 0.5 K/UL (ref 0.1–1.3)
MONOCYTES NFR BLD: 11 % (ref 4–12)
NEUTS SEG # BLD: 2.1 K/UL (ref 1.7–8.2)
NEUTS SEG NFR BLD: 48 % (ref 43–78)
NRBC # BLD: 0 K/UL (ref 0–0.2)
PLATELET # BLD AUTO: 186 K/UL (ref 150–450)
PMV BLD AUTO: 11.2 FL (ref 9.4–12.3)
POTASSIUM SERPL-SCNC: 3.7 MMOL/L (ref 3.5–5.1)
RBC # BLD AUTO: 4.13 M/UL (ref 4.05–5.2)
SODIUM SERPL-SCNC: 140 MMOL/L (ref 136–145)
WBC # BLD AUTO: 4.5 K/UL (ref 4.3–11.1)

## 2020-05-17 PROCEDURE — 99218 HC RM OBSERVATION: CPT

## 2020-05-17 PROCEDURE — 74011250636 HC RX REV CODE- 250/636: Performed by: INTERNAL MEDICINE

## 2020-05-17 PROCEDURE — 85025 COMPLETE CBC W/AUTO DIFF WBC: CPT

## 2020-05-17 PROCEDURE — 80048 BASIC METABOLIC PNL TOTAL CA: CPT

## 2020-05-17 PROCEDURE — 74011250637 HC RX REV CODE- 250/637: Performed by: INTERNAL MEDICINE

## 2020-05-17 PROCEDURE — 82962 GLUCOSE BLOOD TEST: CPT

## 2020-05-17 PROCEDURE — 36415 COLL VENOUS BLD VENIPUNCTURE: CPT

## 2020-05-17 RX ORDER — MECLIZINE HYDROCHLORIDE 25 MG/1
25 TABLET ORAL
Qty: 30 TAB | Refills: 0 | Status: SHIPPED | OUTPATIENT
Start: 2020-05-17 | End: 2020-05-27

## 2020-05-17 RX ORDER — GUAIFENESIN 100 MG/5ML
81 LIQUID (ML) ORAL DAILY
Qty: 30 TAB | Refills: 0 | Status: SHIPPED | OUTPATIENT
Start: 2020-05-17 | End: 2020-06-16

## 2020-05-17 RX ORDER — EZETIMIBE 10 MG/1
10 TABLET ORAL DAILY
Qty: 30 TAB | Refills: 0 | Status: SHIPPED | OUTPATIENT
Start: 2020-05-17 | End: 2020-06-17 | Stop reason: SDUPTHER

## 2020-05-17 RX ADMIN — VITAMIN D, TAB 1000IU (100/BT) 1 TABLET: 25 TAB at 09:01

## 2020-05-17 RX ADMIN — MECLIZINE HYDROCHLORIDE 25 MG: 25 TABLET ORAL at 09:01

## 2020-05-17 RX ADMIN — ASPIRIN 81 MG 81 MG: 81 TABLET ORAL at 09:01

## 2020-05-17 NOTE — PROGRESS NOTES
Pt medically ready for dc home today, pt MD request met with pt about home health, pt agreeable and agreeable to [facility] for RN and PT, order/referral completed. Care Management Interventions  PCP Verified by CM: Yes  Mode of Transport at Discharge:  Other (see comment)(TBD: based upon need)  Transition of Care Consult (CM Consult): Discharge Planning, [address]: Yes  Discharge Durable Medical Equipment: No(Patient confirmed no DME. )  Physical Therapy Consult: Yes  Occupational Therapy Consult: Yes  Speech Therapy Consult: Yes  Current Support Network: Own Home, Lives with Spouse  Confirm Follow Up Transport: Family  The Plan for Transition of Care is Related to the Following Treatment Goals : DC home with [facility] for pt to maintain and enhance quality of life with compliance of therapy   The Patient and/or Patient Representative was Provided with a Choice of Provider and Agrees with the Discharge Plan?: Yes  Name of the Patient Representative Who was Provided with a Choice of Provider and Agrees with the Discharge Plan: patient  Freedom of Choice List was Provided with Basic Dialogue that Supports the Patient's Individualized Plan of Care/Goals, Treatment Preferences and Shares the Quality Data Associated with the Providers?: Yes  [place] Resource Information Provided?: No  Discharge Location  Discharge Placement: Home with home health

## 2020-05-17 NOTE — DISCHARGE SUMMARY
Hospitalist Discharge Summary     Patient ID:  Shanda Falk  [id number]  54 y.o.  1955  Admit date: 5/15/2020 11:15 AM  Discharge date and time: 5/17/2020  Attending: Carloz December DO  PCP:  Cheryl Canales NP  Treatment Team: Attending Provider: Carloz December, DO; Utilization Review: Khris Aranda RN; Consulting Provider: Sara Valdez MD; Charge Nurse: Jaime Alatorre; Care Manager: Littie Oppenheim, RN    Principal Diagnosis Hypertensive emergency   Principal Problem:    Hypertensive emergency (5/15/2020)    Active Problems:    Hypertension ()      Overview: Diagnosed at the age of 50      History of stroke ()      Dyslipidemia ()      Type 2 diabetes mellitus ([facility] (10/31/2017)      Peripheral vascular disease ([facility] (5/14/2019)      Severe obesity (BMI >= 40) ([facility] (8/6/2019)       59 yo AAF with past history of prior CVA, HLD, HTN, and DM type II presents with a 2-day history of worsening dizziness, headache, and nausea without vomiting. She says her symptoms have been intermittent and \"come and go\" but is worsened \"whenever I bent forward. \" She is involved with patient care and has been trying to [facility] at a distant object to make my symptoms go away. \" Due to increasing severity of her symptoms she called EMS. She denies chest pain, shortness of breath, or abdominal pain. She describes her headaches as \"tension headaches across the front that go to the back of my head. \" No associated blurry vision, difficulty with speech/swallowing.      ED Course: noted SBPs in the 240s. Code S called. NIH of 0. Neurology consulted. Not a tPA candidate due to low NIH. CT head negative. Etiology thought likely CVA vs PRES. Started on Cardene gtt but had to be stopped as SBPs fell to the 130s. Hospitalist called for admission. Interval History (5/17): patient examined at bedside. No acute overnight events. Dizziness markedly improved and resolved after receiving meclizine yesterday. Currently denies fevers/chills, chest pain, shortness of breath, abdominal pain, nausea/vomiting, or diarrhea. Hospital Course:  Please refer to the admission H&P for details of presentation. In summary, the patient is admitted for stroke-like symptoms with worsening vertigo. Patient with NIH score of 0 on admission and not a candidate for tPA, CT head negative. Neurology consulted and recommended inpatient admission. MRI head negative for CVA. She was initially placed on Cardene gtt for hypertensive emergency that was weaned off. Patient's vertigo seems peripheral in etiology, she was started on PRN meclizine with dramatic clinical improvement. Recommend patient buy BP cuff and measure BP cuff several times daily. Her home BP held due to \"soft\" BP measurements, recommend taking home lisinopril/HCTZ 10-12.5 mg if SBP>140. Also, patient with noted LDL of 175 despite stated compliance with home Crestor 40 mg po qdaily. Script for ezetimibe 10 mg provided; would recommend PCSK9 inhibitor should patient still have markedly elevated LDL values despite optimal/maximal therapy with ezetimibe and HMG-CoA reductase inhibitors. Patient is hemodynamically stable for hospital discharge with outpatient follow-up. She should follow-up with her PCP within 1 week. Details of hospitalization discussed with patient who understands and agrees with plan of care and discharge home. Return precautions provided. All questions answered. Significant Diagnostic Studies:     Title:  CT arteriogram of the neck and head.       Indication: acute neuro deficit with possible LVAO.     Technique: Axial images of the neck and head were obtained after the uneventful   administration of intravenous iodinated contrast media. Contrast was used to  best identify the arterial structures.   Images were reviewed on a separate, free  standing, three-dimensional workstation as per the referring physicians request.        All stenosis percentages derived by comparing the narrowest segment with the  distal Internal Carotid Artery luminal diameter, as described in the [facility] Symptomatic Carotid Endarterectomy Trial (NASCET) criteria.      All CT scans at this facility are performed using dose reduction/dose modulation  techniques, as appropriate the performed exam, including the following:   Automated Exposure Control; Adjustment of the mA and/or kV according to patient  size (this includes techniques or standardized protocols for targeted exams  where dose is matched to indication/reason for exam); and Use of Iterative  Reconstruction Technique. Comparison: Contemporaneous head CT.     Findings:      Lungs:  No focal consolidation or pleural effusions. No suspicious pulmonary  nodules. .    Bones:  No osseous destruction. . Moderate degenerative changes in the cervical  spine, most advanced at C5 through C7 where there is degenerative disc disease. Paranasal sinuses:  Paranasal sinuses are clear. .    Brain:  No midline shift. No enhancing mass lesion or hydrocephalus. .    Soft tissues:  Subcentimeter nodule in the left thyroid lobe. .       Dural venous sinuses:  Patent.      Aortic arch:  Nonaneurysmal. Mild calcific atherosclerosis. .    Right brachiocephalic artery:  No significant stenosis or occlusion. .  . Right subclavian artery:  No significant stenosis or occlusion. .  . Left subclavian artery:  No significant stenosis or occlusion. .  .       Right common carotid artery:  No significant stenosis or occlusion. .  . Right external carotid artery:  No significant stenosis or occlusion. .  . Right internal carotid artery:  No significant stenosis or occlusion. . The  distal right ICA is relatively small caliber measuring 4 mm. Associated moderate  tortuosity.     Left common carotid artery: No significant stenosis or occlusion. .  . Left external carotid artery:  No significant stenosis or occlusion. .  .     Left internal carotid artery:  No significant stenosis or occlusion. . The distal  left ICA is relatively small caliber measuring 3 mm. Associated moderate  tortuosity.     Right vertebral artery:  No significant stenosis or occlusion. .. Left vertebral artery:  No significant stenosis or occlusion. .   Basilar artery:  No significant stenosis, occlusion, or aneurysm. .       Right middle cerebral artery:  No significant stenosis, occlusion, or aneurysm. Right anterior cerebral artery:  No significant stenosis, occlusion, or  aneurysm. Jonel Salter Anterior communicating artery: No significant stenosis, occlusion, or aneurysm. .        Left middle cerebral artery:  No significant stenosis, occlusion, or aneurysm. .      Left anterior cerebral artery:  No significant stenosis, occlusion, or  aneurysm. .       Right posterior communicating artery: No significant stenosis, occlusion, or  aneurysm. Jonel Salter Left posterior communicating artery:  No significant stenosis, occlusion, or  aneurysm. .       Right posterior cerebral artery:  No significant stenosis, occlusion, or  aneurysm. Jonel Salter Left posterior cerebral artery:  No significant stenosis, occlusion, or  aneurysm. .       IMPRESSION  Impression:      1. No acute arterial cutoff or occlusion is identified. No high-grade stenosis. 2.  The bilateral distal cervical internal carotid arteries are relatively small  caliber measuring 3 mm on the left and 4 mm on the right. There is associated  tortuosity near the skull base which may be the sequela of long-standing  hypertension.     EXAMINATION: BRAIN MRI 5/15/2020 2:21 PM     ACCESSION NUMBER: [id number]     INDICATION: occipital HA, n/v, ? PRES v Stroke     COMPARISON: Head CT and CTA head and neck 5/15/2020     TECHNIQUE: Multiplanar multisequence MRI of the brain without the administration  of intravenous contrast.     FINDINGS:     There are a few nonspecific punctate T2 hyperintensities scattered throughout  the periventricular and subcortical white matter.    Midline structures including the corpus callosum, pituitary gland, optic nerves,  and cerebellum are well developed.      The ventricles are within normal limits. There is no midline shift. The basilar  cisterns are patent. There is no cerebellar tonsillar ectopia or herniation.     Diffusion imaging shows no evidence of acute infarction or other acute  abnormality.     There is patchy T1 hypointensity throughout the visualized bone marrow elements. This is a nonspecific finding which can be seen with smoking, obesity, anemia,  or chronic disease.     IMPRESSION  IMPRESSION:     1. No evidence of acute infarction.     2. Mild burden of chronic microangiopathy. [id number] [facility], [address] [facility]  [phone]     Transthoracic Echocardiogram  2D, M-mode, Doppler, and Color Doppler     Patient: Stephanie Baum  MR #: [id number]  : 1955  Age: 60 years  Gender: Female  Study date: 15-May-2020  Account #: [de-identified]  Height: 64 in  Weight: 229.5 lb  BSA: 2.08 mï¾²  Status:Routine  Location: [facility]  BP: 137/ 65     Allergies: CODEINE     Sonographer:  Eliazar Rosado  Group:  [facility]  Referring Physician:  Barbara Felipe MD  Reading Physician:  DR. SCOTT BILL DO     INDICATIONS: stroke like symptoms     PROCEDURE: This was a routine study. A transthoracic echocardiogram was  performed. The study included complete 2D imaging, M-mode, complete spectral  Doppler, and color Doppler. Intravenous contrast (Definity) was administered. Image quality was good.     LEFT VENTRICLE: Size was normal. Systolic function was normal. Ejection  fraction was estimated in the range of 55 % to 60 %. There were no regional  wall motion abnormalities.  Wall thickness was normal. Normal left ventricular  diastolic function E/e' 66.89     RIGHT VENTRICLE: The size was normal. Systolic function was normal. The  tricuspid jet envelope definition was inadequate for estimation of RV   systolic  pressure.     LEFT ATRIUM: Size was normal.     ATRIAL SEPTUM: No defect or patent foramen ovale was identified. There was no  evidence of intracardiac shunt on bubble study.     RIGHT ATRIUM: Size was normal.     SYSTEMIC VEINS: IVC: The inferior vena cava was normal in size. The  respirophasic change in diameter was more than 50%.     AORTIC VALVE: The valve was trileaflet. There was no evidence for stenosis. There was no insufficiency.     MITRAL VALVE: There was mild annular calcification. There was no evidence for  stenosis. There was mild regurgitation.     TRICUSPID VALVE: The valve structure was normal. There was no evidence for  stenosis. There was mild regurgitation.     PULMONIC VALVE: The valve structure was normal. There was no evidence for  stenosis. There was mild regurgitation.     PERICARDIUM: There was no pericardial effusion.     AORTA: The root exhibited normal size.     SUMMARY:     -  Left ventricle: Systolic function was normal. Ejection fraction was  estimated in the range of 55 % to 60 %. There were no regional wall motion  abnormalities.     -  Right ventricle: Systolic function was normal.     -  Atrial septum: There was no evidence of intracardiac shunt on bubble   study.     -  Inferior vena cava, hepatic veins: The respirophasic change in diameter   was  more than 50%.     SYSTEM MEASUREMENT TABLES     2D mode  Left Atrium Systolic Volume Index; Method of Disks, Biplane; 2D mode;: 26.5  ml/m2  IVS/LVPW (2D): 1.1  IVSd (2D): 1.1 cm  LVIDd (2D): 4.3 cm  LVIDs (2D): 2.6 cm  LVPWd (2D): 1 cm  RVIDd (2D): 3.3 cm     Unspecified Scan Mode  Peak Grad; Mean; Antegrade Flow: 13 mm[Hg]  Vmax; Antegrade Flow: 182 cm/s     Prepared and signed by     DR. Hernan Johnston DO  Signed 15-May-2020 17:03:59    Labs: Results:       Chemistry Recent Labs     05/17/20  0533 05/16/20  1225 05/16/20  0501 05/15/20  1127   GLU 97  --  100 108*     --  141 139   K 3.7 4.0 2.9* 3.4*     --  107 104   CO2 28  --  30 29   BUN 21  --  16 19   CREA 1.12*  --  0.99 1.20*   CA 8.8  --  8.8 9.5   AGAP 5*  --  4* 6*      CBC w/Diff Recent Labs     05/17/20  0533 05/16/20  0501 05/15/20  1127   WBC 4.5 4.2* 4.0*   RBC 4.13 4.12 4.68   HGB 11.4* 11.3* 12.7   HCT 35.0* 34.8* 39.7    186 215   GRANS 48 44 42*   LYMPH 41 44 46*   EOS 1 1 1      Cardiac Enzymes No results for input(s): CPK, CKND1, JOSE LUIS in the last 72 hours. No lab exists for component: CKRMB, TROIP   Coagulation Recent Labs     05/15/20  1124   INR 1.0       Lipid Panel Lab Results   Component Value Date/Time    Cholesterol, total 263 (H) 05/16/2020 05:01 AM    HDL Cholesterol 61 (H) 05/16/2020 05:01 AM    LDL, calculated 175.6 (H) 05/16/2020 05:01 AM    VLDL, calculated 26.4 (H) 05/16/2020 05:01 AM    Triglyceride 132 05/16/2020 05:01 AM    CHOL/HDL Ratio 4.3 05/16/2020 05:01 AM      BNP No results for input(s): BNPP in the last 72 hours. Liver Enzymes No results for input(s): TP, ALB, TBIL, AP, SGOT, GPT in the last 72 hours. No lab exists for component: DBIL   Thyroid Studies Lab Results   Component Value Date/Time    TSH 1.870 02/06/2020 10:10 AM            Discharge Exam:  Visit Vitals  /79 (BP 1 Location: Left arm, BP Patient Position: At rest)   Pulse 74   Temp 98.2 °F (36.8 °C)   Resp 17   Ht 5' 4\" (1.626 m)   Wt 104.4 kg (230 lb 1.6 oz)   SpO2 98%   BMI 39.50 kg/m²     General appearance: alert, cooperative, no distress, appears stated age  Lungs: clear to auscultation bilaterally  Heart: regular rate and rhythm, S1, S2 normal, no murmur, click, rub or gallop  Abdomen: soft, non-tender.  Bowel sounds normal. No masses,  no organomegaly  Extremities: no cyanosis or edema  Neurologic: Grossly normal    Disposition: home  Discharge Condition: stable  Patient Instructions:   Current Discharge Medication List      START taking these medications    Details   aspirin 81 mg chewable tablet Take 1 Tab by mouth daily for 30 days. Qty: 30 Tab, Refills: 0      meclizine (ANTIVERT) 25 mg tablet Take 1 Tab by mouth three (3) times daily as needed for Dizziness for up to 10 days. Qty: 30 Tab, Refills: 0      ezetimibe (ZETIA) 10 mg tablet Take 1 Tab by mouth daily for 30 days. Qty: 30 Tab, Refills: 0         CONTINUE these medications which have NOT CHANGED    Details   furosemide (LASIX) 20 mg tablet TAKE 1 TAB BY MOUTH DAILY AS NEEDED (LEG SWELLING). Qty: 30 Tab, Refills: 0    Associated Diagnoses: Leg edema      lisinopril-hydroCHLOROthiazide (PRINZIDE, ZESTORETIC) 10-12.5 mg per tablet TAKE 1 TAB BY MOUTH TWO (2) TIMES A DAY. Qty: 60 Tab, Refills: 0    Associated Diagnoses: Essential hypertension      cholecalciferol (VITAMIN D3) 25 mcg (1,000 unit) cap Take  by mouth daily. ergocalciferol (ERGOCALCIFEROL) 1,250 mcg (50,000 unit) capsule Take 1 capsule twice a week x 4 weeks, then once a week thereafter  Qty: 8 Cap, Refills: 2    Associated Diagnoses: Vitamin D deficiency      atenoloL (TENORMIN) 100 mg tablet Take 1 Tab by mouth two (2) times a day. Qty: 180 Tab, Refills: 2      rosuvastatin (CRESTOR) 40 mg tablet Take 1 Tab by mouth nightly. Qty: 90 Tab, Refills: 0    Associated Diagnoses: Dyslipidemia      potassium chloride (K-DUR, KLOR-CON) 20 mEq tablet Take 1 Tab by mouth daily as needed (with lasix for leg swelling). Qty: 30 Tab, Refills: 1    Associated Diagnoses: Leg edema      meloxicam (MOBIC) 7.5 mg tablet Take 1 Tab by mouth daily. Qty: 30 Tab, Refills: 2      glucose blood VI test strips (ACCU-CHEK SANTINO PLUS TEST STRP) strip Test up to 2 times a day  Qty: 100 Strip, Refills: 2    Associated Diagnoses: Controlled type 2 diabetes mellitus without complication, without long-term current use of insulin (HCC)      Blood-Glucose Meter misc 1 Units by Does Not Apply route DIALYSIS PRN.   Qty: 1 Each, Refills: 0    Associated Diagnoses: Controlled type 2 diabetes mellitus without complication, without long-term current use of insulin (HCC)      metFORMIN (GLUMETZA ER) 500 mg TG24 24 hour tablet Take 1,000 mg by mouth daily. Qty: 180 Tab, Refills: 2             Activity: PT/OT per Home Health  Diet: Cardiac Diet  Wound Care: None needed    Follow-up  ·   With PCP within 1 week. Time spent to discharge patient 35 minutes  Signed:   Brianda Mayfield DO  5/17/2020  9:42 AM

## 2020-05-17 NOTE — PROCEDURES
Transcranial Doppler    Transcranial Doppler studies were performed for evaluation of intracranial flow parameters in a patient with severe hypertensive urgency and clinical stroke    Insonation was attempted via the transtemporal windows but was not successful insonation was performed via the orbital windows and via the foramen magnum window of the posterior fossa    There is antegrade flow identified in the intracranial vasculature and there are normal flow velocities identified. No localized area of narrowing is identified.   Flow velocities in the internal carotid arteries in the region of the siphon are normal on each side  Adequate insonation of the middle cerebral arteries could not be obtained    Impression    Transcranial Doppler study is somewhat limited from the standpoint of acoustic windows but the insonated vessels are normal

## 2020-05-17 NOTE — PROGRESS NOTES
Pt discharged home with family and [facility] care, discharge instructions, scripts provided by primary RN.

## 2020-05-17 NOTE — DISCHARGE INSTRUCTIONS
Patient Education        Vertigo: Care Instructions  Your Care Instructions    Vertigo is the feeling that you or your surroundings are moving when there is no actual movement. It is often described as a feeling of spinning, whirling, falling, or tilting. Vertigo may make you vomit or feel nauseated. You may have trouble standing or walking and may lose your balance. Vertigo is often related to an inner ear problem, but it can have other more serious causes. If vertigo continues, you may need more tests to find its cause. Follow-up care is a key part of your treatment and safety. Be sure to make and go to all appointments, and call your doctor if you are having problems. It's also a good idea to know your test results and keep a list of the medicines you take. How can you care for yourself at home? · Do not lie flat on your back. Prop yourself up slightly. This may reduce the spinning feeling. Keep your eyes open. · Move slowly so that you do not fall. · If your doctor recommends medicine, take it exactly as directed. · Do not drive while you are having vertigo. Certain exercises, called O[place]ied[place]-Daroff exercises, can help decrease vertigo. To do O[place]o-Daroff exercises:  · Sit on the edge of a bed or sofa and quickly lie down on the side that causes the worst vertigo. Lie on your side with your ear down. · Stay in this position for at least 30 seconds or until the vertigo goes away. · Sit up. If this causes vertigo, wait for it to stop. · Repeat the procedure on the other side. · Repeat this 10 times. Do these exercises 2 times a day until the vertigo is gone. When should you call for help? Call 911 anytime you think you may need emergency care. For example, call if:    · You passed out (lost consciousness).     · You have symptoms of a stroke. These may include:  ? Sudden numbness, tingling, weakness, or loss of movement in your face, arm, or leg, especially on only one side of your body.   ? Sudden vision changes. ? Sudden trouble speaking. ? Sudden confusion or trouble understanding simple statements. ? Sudden problems with walking or balance. ? A sudden, severe headache that is different from past headaches.    Call your doctor now or seek immediate medical care if:    · Vertigo occurs with a fever, a headache, or ringing in your ears.     · You have new or increased nausea and vomiting.    Watch closely for changes in your health, and be sure to contact your doctor if:    · Vertigo gets worse or happens more often.     · Vertigo has not gotten better after 2 weeks. Where can you learn more? Go to http://sharon-gem.info/  Enter I165 in the search box to learn more about \"Vertigo: Care Instructions. \"  Current as of: July 28, 2019Content Version: 12.4  © 7146-4349 [employer]. Care instructions adapted under license by [employer] (which disclaims liability or warranty for this information). If you have questions about a medical condition or this instruction, always ask your healthcare professional. [place] [address] any warranty or liability for your use of this information.

## 2020-05-17 NOTE — PROGRESS NOTES
05/17/20 0703   NIH Stroke Scale   Interval Other (comment)  (dual NIH with Tima Badillo RN )   LOC 0   LOC Questions 0   LOC Commands 0   Best Gaze 0   Visual 0   Facial Palsy 0   Motor Right Arm 0   Motor Left Arm 0   Motor Right Leg 1   Motor Left Leg 0   Limb Ataxia 0   Sensory 0   Best Language 0   Dysarthria 0   Extinction and Inattention 0   Total 1

## 2020-05-18 ENCOUNTER — HOME CARE VISIT (OUTPATIENT)
Dept: HOME HEALTH SERVICES | Facility: HOME HEALTH | Age: 65
End: 2020-05-18

## 2020-05-18 ENCOUNTER — PATIENT OUTREACH (OUTPATIENT)
Dept: CASE MANAGEMENT | Age: 65
End: 2020-05-18

## 2020-05-18 NOTE — PROGRESS NOTES
Initial [facility] outreach attempt to patient's home/mobile number was unsuccessful. Left message to return call. Will attempt second outreach within 24 hours.

## 2020-05-18 NOTE — PROGRESS NOTES
Transition of Care Hospital Discharge Follow-Up      Date/Time:  2020 4:44 PM    Patient was admitted to [facility] on 5/15/20 and discharged on 20 for hypertensive emergency. The physician discharge summary was available at the time of outreach. Patient was contacted within 1 business days of discharge. Inpatient RUR score: 8  Was this a readmission? no   Patient stated reason for the readmission: n/a  Patient states she is feeling much better, her headache has resolved. LPN Care Coordinator contacted the patient by telephone to perform post hospital discharge assessment. Verified name and  with patient as identifiers. Provided introduction to self, and explanation of the Care Coordinator role. Patient received hospital discharge instructions. LPN reviewed discharge instructions and red flags with patient who verbalized understanding. Patient given an opportunity to ask questions and does not have any further questions or concerns at this time. The patient agrees to contact the PCP office for questions related to their healthcare. LPN provided contact information for future reference. Patients top risk factors for readmission:  comorbidities    Home Health orders at discharge: RN/PT  [address]: [facility]  Date of initial or scheduled visit: 20  (Assist with coordination of services if necessary.)    Durable Medical Equipment ordered at discharge: none  [address] received: n/a  (Assist patient in obtaining DME orders &/or equipment if necessary.)    Medication(s):   Medication review was performed with patient, who verbalizes understanding of administration of home medications. There were no barriers to obtaining medications identified at this time. Current Outpatient Medications   Medication Sig    aspirin 81 mg chewable tablet Take 1 Tab by mouth daily for 30 days.     meclizine (ANTIVERT) 25 mg tablet Take 1 Tab by mouth three (3) times daily as needed for Dizziness for up to 10 days.  ezetimibe (ZETIA) 10 mg tablet Take 1 Tab by mouth daily for 30 days.  furosemide (LASIX) 20 mg tablet TAKE 1 TAB BY MOUTH DAILY AS NEEDED (LEG SWELLING).  lisinopril-hydroCHLOROthiazide (PRINZIDE, ZESTORETIC) 10-12.5 mg per tablet TAKE 1 TAB BY MOUTH TWO (2) TIMES A DAY.  cholecalciferol (VITAMIN D3) 25 mcg (1,000 unit) cap Take  by mouth daily.  ergocalciferol (ERGOCALCIFEROL) 1,250 mcg (50,000 unit) capsule Take 1 capsule twice a week x 4 weeks, then once a week thereafter    atenoloL (TENORMIN) 100 mg tablet Take 1 Tab by mouth two (2) times a day.  rosuvastatin (CRESTOR) 40 mg tablet Take 1 Tab by mouth nightly.  potassium chloride (K-DUR, KLOR-CON) 20 mEq tablet Take 1 Tab by mouth daily as needed (with lasix for leg swelling).  meloxicam (MOBIC) 7.5 mg tablet Take 1 Tab by mouth daily.  glucose blood VI test strips (ACCU-CHEK SANTINO PLUS TEST STRP) strip Test up to 2 times a day    Blood-Glucose Meter misc 1 Units by Does Not Apply route DIALYSIS PRN.  metFORMIN (GLUMETZA ER) 500 mg TG24 24 hour tablet Take 1,000 mg by mouth daily. No current facility-administered medications for this visit. There are no discontinued medications. ADL assessment: Independent  (If patient is unable to perform ADLs  what is the limiting factor(s)? Do they have a support system that can assist? If no support system is present, discuss possible assistance that they may be able to obtain.  Escalate for SW if ongoing issues are verbalized by pt or anticipated)    [facility] follow up appointment(s):   Future Appointments   Date Time Provider Hailee Sibley   5/19/2020 11:00 AM GENTRY Rader   8/6/2020  8:30 AM GENTRY Rader      Non-[facility] follow up appointment(s): n/a  7 Day follow up with PCP or Specialist:   Transportation arranged: yes    Any other questions or concerns expressed by patient? no    Schedule next appointment with REBEKA or referral to CTN/ ACM: Care Coordinator will follow per workflow guidelines  Next follow up call: within 14 days    Education provided regarding infection prevention, and signs and symptoms of COVID-19 and when to seek medical attention with patient who verbalized understanding. Discussed exposure protocols and quarantine from [address] you at higher risk for severe illness 2019 and given an opportunity for questions and concerns. The patient agrees to contact the COVID-19 hotline [phone] or PCP office for questions related to their healthcare. CCC provided contact information for future reference. From CDC: Are you at higher risk for severe illness?  Wash your hands often.  Avoid close contact (6 feet, which is about two arm lengths) with people who are sick.  Put distance between yourself and other people if COVID-19 is spreading in your community.  Clean and disinfect frequently touched surfaces.  Avoid all cruise travel and non-essential air travel.  Call your healthcare professional if you have concerns about COVID-19 and your underlying condition or if you are sick. For more information on steps you can take to protect yourself, see CDC's How to Protect Yourself      Patient/family/caregiver given information for Alli Friend and agrees to enroll: no  Patient's preferred e-mail:    Patient's preferred phone number:   Based on Loop alert triggers, patient will be contacted by nurse care manager for worsening symptoms.

## 2020-05-21 ENCOUNTER — HOME CARE VISIT (OUTPATIENT)
Dept: SCHEDULING | Facility: HOME HEALTH | Age: 65
End: 2020-05-21
Payer: COMMERCIAL

## 2020-05-21 VITALS
SYSTOLIC BLOOD PRESSURE: 152 MMHG | RESPIRATION RATE: 16 BRPM | DIASTOLIC BLOOD PRESSURE: 85 MMHG | TEMPERATURE: 97.4 F | OXYGEN SATURATION: 98 % | HEART RATE: 58 BPM

## 2020-05-21 PROCEDURE — 400013 HH SOC

## 2020-05-21 PROCEDURE — G0299 HHS/HOSPICE OF RN EA 15 MIN: HCPCS

## 2020-05-21 NOTE — ADT AUTH CERT NOTES
PREVIOUSLY DENIED FOR INPATIENT DOWNGRADED TO OBSERVATION REF #[id number] PLEASE FAX OR CALL BACK AUTHORIZATION FOR OBSERVATION  PHONE # [phone]  FAX # [phone]

## 2020-05-23 ENCOUNTER — HOME CARE VISIT (OUTPATIENT)
Dept: SCHEDULING | Facility: HOME HEALTH | Age: 65
End: 2020-05-23
Payer: COMMERCIAL

## 2020-05-23 VITALS
SYSTOLIC BLOOD PRESSURE: 150 MMHG | TEMPERATURE: 97.8 F | HEART RATE: 56 BPM | DIASTOLIC BLOOD PRESSURE: 80 MMHG | RESPIRATION RATE: 18 BRPM

## 2020-05-23 PROCEDURE — G0151 HHCP-SERV OF PT,EA 15 MIN: HCPCS

## 2020-05-26 ENCOUNTER — HOME CARE VISIT (OUTPATIENT)
Dept: SCHEDULING | Facility: HOME HEALTH | Age: 65
End: 2020-05-26
Payer: COMMERCIAL

## 2020-05-26 VITALS
DIASTOLIC BLOOD PRESSURE: 82 MMHG | OXYGEN SATURATION: 99 % | HEART RATE: 56 BPM | RESPIRATION RATE: 18 BRPM | TEMPERATURE: 97.6 F | SYSTOLIC BLOOD PRESSURE: 128 MMHG

## 2020-05-26 PROCEDURE — G0299 HHS/HOSPICE OF RN EA 15 MIN: HCPCS

## 2020-05-29 ENCOUNTER — HOME CARE VISIT (OUTPATIENT)
Dept: HOME HEALTH SERVICES | Facility: HOME HEALTH | Age: 65
End: 2020-05-29
Payer: COMMERCIAL

## 2020-06-02 ENCOUNTER — PATIENT OUTREACH (OUTPATIENT)
Dept: CASE MANAGEMENT | Age: 65
End: 2020-06-02

## 2020-06-02 ENCOUNTER — HOME CARE VISIT (OUTPATIENT)
Dept: SCHEDULING | Facility: HOME HEALTH | Age: 65
End: 2020-06-02
Payer: COMMERCIAL

## 2020-06-02 VITALS
RESPIRATION RATE: 16 BRPM | DIASTOLIC BLOOD PRESSURE: 80 MMHG | TEMPERATURE: 98.2 F | HEART RATE: 77 BPM | SYSTOLIC BLOOD PRESSURE: 130 MMHG | OXYGEN SATURATION: 98 %

## 2020-06-02 PROCEDURE — G0299 HHS/HOSPICE OF RN EA 15 MIN: HCPCS

## 2020-06-02 NOTE — PROGRESS NOTES
Transition of Care Hospital Discharge Follow-Up      Date/Time:  2020 2:29 PM    LPN Care Coordinator contacted the patient by telephone to perform post hospital follow up. Verified name and  with patient as identifiers. LPN reinforced discharge instructions and red flags with patient who verbalized understanding. Patient given an opportunity to ask questions and does not have any further questions or concerns at this time. The patient agrees to contact the PCP office for questions related to their healthcare    [facility] follow up appointment(s):   Future Appointments   Date Time Provider Hailee Sibley   2020 To Be Determined Jose Pattee 262 [address] Danish [address]   6/10/2020 To Be Determined Jose Pattee 262 Walter Danish [address]   2020 To Be Determined Jose Pattee [address]   2020 To Be Determined Jose Pattee [address]   2020 To Be Determined Jose Pattee [address]   2020  8:30 AM Claribel Jarrett, NP [facility]      Non-[facility] follow up appointment(s): n/a  Patient attended follow up appointments since last contact: yes  What was the outcome of the appointment:     [address]: [facility]: [facility] [place], operated by [facility]  Any issues/ progress:  (Assist with coordination of services if necessary.)      Medication(s):   Medication changes since discharge:     Current Outpatient Medications   Medication Sig    lisinopril-hydroCHLOROthiazide (PRINZIDE, ZESTORETIC) 10-12.5 mg per tablet Take 1 Tab by mouth two (2) times a day.  pravastatin (PRAVACHOL) 80 mg tablet Take 80 mg by mouth nightly.  Blood Pressure Monitor (Blood Pressure Kit) kit Check blood pressure daily    aspirin 81 mg chewable tablet Take 1 Tab by mouth daily for 30 days.  ezetimibe (ZETIA) 10 mg tablet Take 1 Tab by mouth daily for 30 days.  furosemide (LASIX) 20 mg tablet TAKE 1 TAB BY MOUTH DAILY AS NEEDED (LEG SWELLING).     cholecalciferol (VITAMIN D3) 25 mcg (1,000 unit) cap Take  by mouth daily.  ergocalciferol (ERGOCALCIFEROL) 1,250 mcg (50,000 unit) capsule Take 1 capsule twice a week x 4 weeks, then once a week thereafter    atenoloL (TENORMIN) 100 mg tablet Take 1 Tab by mouth two (2) times a day.  potassium chloride (K-DUR, KLOR-CON) 20 mEq tablet Take 1 Tab by mouth daily as needed (with lasix for leg swelling).  meloxicam (MOBIC) 7.5 mg tablet Take 1 Tab by mouth daily.  glucose blood VI test strips (ACCU-CHEK SANTINO PLUS TEST STRP) strip Test up to 2 times a day    Blood-Glucose Meter misc 1 Units by Does Not Apply route DIALYSIS PRN.  metFORMIN (GLUMETZA ER) 500 mg TG24 24 hour tablet Take 1,000 mg by mouth daily. No current facility-administered medications for this visit. Other Issues/ Miscellaneous? (Transportation, access to meals, ability to perform ADLs, adequate caregiver support, etc.) No  Referrals needed? (SW, Diabetes education, HH, etc.) No    Any other questions or concerns expressed by patient?  Not at this time    Schedule next appointment with REBEKA or referral to CTN/ ACM: n/a  Graduation: yes  Next Outreach Scheduled: n/a

## 2020-06-04 ENCOUNTER — HOSPITAL ENCOUNTER (OUTPATIENT)
Dept: LAB | Age: 65
Discharge: HOME | End: 2020-06-04
Payer: COMMERCIAL

## 2020-06-04 ENCOUNTER — HOME CARE VISIT (OUTPATIENT)
Dept: SCHEDULING | Facility: HOME HEALTH | Age: 65
End: 2020-06-04
Payer: COMMERCIAL

## 2020-06-04 VITALS
SYSTOLIC BLOOD PRESSURE: 140 MMHG | RESPIRATION RATE: 16 BRPM | OXYGEN SATURATION: 99 % | TEMPERATURE: 98.5 F | DIASTOLIC BLOOD PRESSURE: 90 MMHG | HEART RATE: 100 BPM

## 2020-06-04 LAB
ALBUMIN SERPL-MCNC: 3.5 G/DL (ref 3.2–4.6)
ALBUMIN/GLOB SERPL: 0.8 {RATIO} (ref 1.2–3.5)
ALP SERPL-CCNC: 106 U/L (ref 50–130)
ALT SERPL-CCNC: 16 U/L (ref 12–65)
ANION GAP SERPL CALC-SCNC: 9 MMOL/L (ref 7–16)
AST SERPL-CCNC: 19 U/L (ref 15–37)
BILIRUB SERPL-MCNC: 0.5 MG/DL (ref 0.2–1.1)
BUN SERPL-MCNC: 16 MG/DL (ref 8–23)
CALCIUM SERPL-MCNC: 8.8 MG/DL (ref 8.3–10.4)
CHLORIDE SERPL-SCNC: 105 MMOL/L (ref 98–107)
CHOLEST SERPL-MCNC: 186 MG/DL
CO2 SERPL-SCNC: 25 MMOL/L (ref 21–32)
CREAT SERPL-MCNC: 1.18 MG/DL (ref 0.6–1)
GLOBULIN SER CALC-MCNC: 4.2 G/DL (ref 2.3–3.5)
GLUCOSE SERPL-MCNC: 106 MG/DL (ref 65–100)
HDLC SERPL-MCNC: 64 MG/DL (ref 40–60)
HDLC SERPL: 2.9 {RATIO}
LDLC SERPL CALC-MCNC: 94.4 MG/DL
LIPID PROFILE,FLP: ABNORMAL
POTASSIUM SERPL-SCNC: 3.3 MMOL/L (ref 3.5–5.1)
PROT SERPL-MCNC: 7.7 G/DL (ref 6.3–8.2)
SODIUM SERPL-SCNC: 139 MMOL/L (ref 136–145)
TRIGL SERPL-MCNC: 138 MG/DL (ref 35–150)
VLDLC SERPL CALC-MCNC: 27.6 MG/DL (ref 6–23)

## 2020-06-04 PROCEDURE — 80061 LIPID PANEL: CPT

## 2020-06-04 PROCEDURE — 80053 COMPREHEN METABOLIC PANEL: CPT

## 2020-06-04 PROCEDURE — G0299 HHS/HOSPICE OF RN EA 15 MIN: HCPCS

## 2020-06-05 NOTE — PROGRESS NOTES
Lipids have improved but are not at goal. Repatha sent to your pharmacy. Kidney function is low. Nephrology Referral.   Liver function ok. Potassium is low - take 20meq Potassium x 2 doses.

## 2020-08-06 PROBLEM — E11.21 TYPE 2 DIABETES WITH NEPHROPATHY (HCC): Status: ACTIVE | Noted: 2020-08-06

## 2020-08-06 PROBLEM — E55.9 VITAMIN D DEFICIENCY: Status: ACTIVE | Noted: 2020-08-06

## 2020-11-10 ENCOUNTER — HOSPITAL ENCOUNTER (OUTPATIENT)
Dept: SURGERY | Age: 65
Discharge: HOME | End: 2020-11-10
Attending: SURGERY
Payer: COMMERCIAL

## 2020-11-10 VITALS
TEMPERATURE: 97 F | WEIGHT: 237.4 LBS | OXYGEN SATURATION: 100 % | BODY MASS INDEX: 40.53 KG/M2 | HEART RATE: 61 BPM | SYSTOLIC BLOOD PRESSURE: 139 MMHG | DIASTOLIC BLOOD PRESSURE: 70 MMHG | HEIGHT: 64 IN | RESPIRATION RATE: 16 BRPM

## 2020-11-10 LAB
ALBUMIN SERPL-MCNC: 3.9 G/DL (ref 3.2–4.6)
ALBUMIN/GLOB SERPL: 0.8 {RATIO} (ref 1.2–3.5)
ALP SERPL-CCNC: 123 U/L (ref 50–136)
ALT SERPL-CCNC: 15 U/L (ref 12–65)
ANION GAP SERPL CALC-SCNC: 6 MMOL/L (ref 7–16)
AST SERPL-CCNC: 22 U/L (ref 15–37)
BILIRUB SERPL-MCNC: 0.5 MG/DL (ref 0.2–1.1)
BUN SERPL-MCNC: 16 MG/DL (ref 8–23)
CALCIUM SERPL-MCNC: 9.3 MG/DL (ref 8.3–10.4)
CHLORIDE SERPL-SCNC: 103 MMOL/L (ref 98–107)
CO2 SERPL-SCNC: 29 MMOL/L (ref 21–32)
CREAT SERPL-MCNC: 1.3 MG/DL (ref 0.6–1)
ERYTHROCYTE [DISTWIDTH] IN BLOOD BY AUTOMATED COUNT: 13.8 % (ref 11.9–14.6)
EST. AVERAGE GLUCOSE BLD GHB EST-MCNC: 123 MG/DL
GLOBULIN SER CALC-MCNC: 4.6 G/DL (ref 2.3–3.5)
GLUCOSE SERPL-MCNC: 108 MG/DL (ref 65–100)
HBA1C MFR BLD: 5.9 %
HCT VFR BLD AUTO: 40.2 % (ref 35.8–46.3)
HGB BLD-MCNC: 12.8 G/DL (ref 11.7–15.4)
MCH RBC QN AUTO: 27.4 PG (ref 26.1–32.9)
MCHC RBC AUTO-ENTMCNC: 31.8 G/DL (ref 31.4–35)
MCV RBC AUTO: 86.1 FL (ref 79.6–97.8)
NRBC # BLD: 0 K/UL (ref 0–0.2)
PLATELET # BLD AUTO: 224 K/UL (ref 150–450)
PMV BLD AUTO: 11.7 FL (ref 9.4–12.3)
POTASSIUM SERPL-SCNC: 3.1 MMOL/L (ref 3.5–5.1)
PROT SERPL-MCNC: 8.5 G/DL (ref 6.3–8.2)
RBC # BLD AUTO: 4.67 M/UL (ref 4.05–5.2)
SODIUM SERPL-SCNC: 138 MMOL/L (ref 136–145)
WBC # BLD AUTO: 5.2 K/UL (ref 4.3–11.1)

## 2020-11-10 PROCEDURE — 80053 COMPREHEN METABOLIC PANEL: CPT

## 2020-11-10 PROCEDURE — 77030027138 HC INCENT SPIROMETER -A

## 2020-11-10 PROCEDURE — 83036 HEMOGLOBIN GLYCOSYLATED A1C: CPT

## 2020-11-10 PROCEDURE — 36415 COLL VENOUS BLD VENIPUNCTURE: CPT

## 2020-11-10 PROCEDURE — 85027 COMPLETE CBC AUTOMATED: CPT

## 2020-11-10 NOTE — PERIOP NOTES
PLEASE CONTINUE TAKING ALL PRESCRIPTION MEDICATIONS UP TO THE DAY OF SURGERY UNLESS OTHERWISE DIRECTED BELOW. DISCONTINUE all vitamins and supplements 7 days prior to surgery. DISCONTINUE Non-Steriodal Anti-Inflammatory (NSAIDS) such as Advil and Aleve 5 days prior to surgery. Home Medications to take  the day of surgery   Atenolol, meclizine if needed,             Home Medications   to Hold   Stop meloxicam five days prior to surgery         Comments          *Visitor policy of 1 visitor per patient discussed. Please do not bring home medications with you on the day of surgery unless otherwise directed by your nurse. If you are instructed to bring home medications, please give them to your nurse as they will be administered by the nursing staff. If you have any questions, please call [facility] [phone]. A copy of this note was provided to the patient for reference.

## 2020-11-10 NOTE — PERIOP NOTES
Patient verified name and     Order for consent found in EHR and matches case posting; patient verified. Laparoscopic Sleeve Gastrectomy possible Open     Type 2 surgery, PAT walk in assessment complete. Labs per surgeon: CBC, CMP, HgbA1C; results pending. Type and Screen DOS. Labs per anesthesia protocol: no additional lab work needed. EKG: Done 5/15/20- within anesthesia guidelines. Found in EHR if needed for anesthesia reference. Cardiology office visit with cardiac clearance dated 20, Echo dated 5/15/20 and stress test dated 16 found in EHR if needed for anesthesia reference. Patient aware that a negative Covid swab result is required to proceed with surgery;  appointments are made by the surgeon office and test should be collected 7 days prior to surgery. The testing center is located at the . [address] [place] [address]. [facility] approved surgical skin cleanser and instructions given per hospital policy. Patient provided with and instructed on educational handouts including Guide to Surgery, Pain Management, Hand Hygiene, Blood Transfusion Education, and [facility] Brochure. Patient answered medical/surgical history questions at their best of ability. All prior to admission medications documented in [facility]. Original medication prescription bottle visualized during patient appointment. Patient instructed to hold all vitamins 7 days prior to surgery and NSAIDS 5 days prior to surgery, patient verbalized understanding. Patient teach back successful and patient demonstrates knowledge of instructions.

## 2020-11-10 NOTE — PERIOP NOTES
Enhanced Recovery After Surgery: Diabetic patients with history of neuropathy    Drink Glucerna- one bottle twice daily for five days beginning on 11/11/20 and stopping two days prior to surgery 11/15/20. If Glucerna is not available, drink 1/2 bottle of Ensure Enlive four times daily. Do not drink any Glucerna (or Ensure Enlive) the day before surgery 11/16/20. It is recommended that you continue drinking this for one month  after surgery if ok with your physician. Follow your surgeon's instructions regarding diet in the days leading up to your surgery and regarding any bowel preparation. The night before surgery 11/16/20, drink two bottles of the Ensure Pre-Surgery drink. You will not drink a Pre-Surgery drink the morning of surgery. Bring your patient handbook with you to the hospital.      Things to remember:    1. You will be up on a chair the evening of surgery and drinking clear liquids. Your diet will be advanced by your surgeon as appropriate. 2. Beginning the day after surgery, you will be up in a chair for all meals. 3. Beginning the day after surgery, you will be out of the bed for a minimum of 6 hours (not all at one time)    4. Beginning the day after surgery, you will walk in the han in the han at least 50' at least three times a day. 5. You will be given scheduled non-narcotic pain medication to help keep your pain under control. You will have stronger pain medication ordered for break through pain. 6. All of these measures are geared toward returning your bowel to normal function as soon as possible and to prevent complications associated with bowel blockage, blood clots, and/or pneumonia.

## 2020-11-11 NOTE — PERIOP NOTES
Labs done 11/10/20 within [facility] protocols. Recent Results (from the past 24 hour(s))   CBC W/O DIFF    Collection Time: 11/10/20  4:05 PM   Result Value Ref Range    WBC 5.2 4.3 - 11.1 K/uL    RBC 4.67 4.05 - 5.2 M/uL    HGB 12.8 11.7 - 15.4 g/dL    HCT 40.2 35.8 - 46.3 %    MCV 86.1 79.6 - 97.8 FL    MCH 27.4 26.1 - 32.9 PG    MCHC 31.8 31.4 - 35.0 g/dL    RDW 13.8 11.9 - 14.6 %    PLATELET 219 045 - 944 K/uL    MPV 11.7 9.4 - 12.3 FL    ABSOLUTE NRBC 0.00 0.0 - 0.2 K/uL   HEMOGLOBIN A1C WITH EAG    Collection Time: 11/10/20  4:05 PM   Result Value Ref Range    Hemoglobin A1c 5.9 %    Est. average glucose 146 mg/dL   METABOLIC PANEL, COMPREHENSIVE    Collection Time: 11/10/20  4:05 PM   Result Value Ref Range    Sodium 138 136 - 145 mmol/L    Potassium 3.1 (L) 3.5 - 5.1 mmol/L    Chloride 103 98 - 107 mmol/L    CO2 29 21 - 32 mmol/L    Anion gap 6 (L) 7 - 16 mmol/L    Glucose 108 (H) 65 - 100 mg/dL    BUN 16 8 - 23 MG/DL    Creatinine 1.30 (H) 0.6 - 1.0 MG/DL    GFR est AA 53 (L) >60 ml/min/1.73m2    GFR est non-AA 44 (L) >60 ml/min/1.73m2    Calcium 9.3 8.3 - 10.4 MG/DL    Bilirubin, total 0.5 0.2 - 1.1 MG/DL    ALT (SGPT) 15 12 - 65 U/L    AST (SGOT) 22 15 - 37 U/L    Alk.  phosphatase 123 50 - 136 U/L    Protein, total 8.5 (H) 6.3 - 8.2 g/dL    Albumin 3.9 3.2 - 4.6 g/dL    Globulin 4.6 (H) 2.3 - 3.5 g/dL    A-G Ratio 0.8 (L) 1.2 - 3.5

## 2020-11-11 NOTE — H&P
Mario Schmidt MD                                  [address] [place] [address]                                  Phone [phone], Fax [phone]    History and Physical    Patient: Cindy Neumann MRN: [id number]  SSN: [id number]    YOB: 1955  Age: 63 y.o. Sex: female              PCP: Erik Alvarez NP   Date:  11/11/2020        Cindy Neumann returns to the [facility] after successful completion of our multidisciplinary surgical prep program.  This is her final consultation preparing her for Laparoscopic Sleeve Gastrectomy surgery. She presents with a height of   and weight of  , giving her a There is no height or weight on file to calculate BMI. She has an Ideal body weight of 145 lbs, and excess body weight of 94 lbs. She started our program with a weight of 239 lbs. She has completed all aspects of our prep program and has been deemed an acceptable candidate for bariatric surgery. PSYCHOLOGICAL EVALUATION:   Completed with CLEMENCIA Wilson on 10/20/2020 deeming her and appropriate surgical candidate. DIETITIAN EVALUATION:  Completed with Alli Godoy deeming her an appropriate surgical candidate. BARIATRIC LABS:    Component      Latest Ref Rng & Units 8/6/2020 2/6/2020 2/6/2020 11/26/2019           9:21 AM 10:10 AM 10:10 AM 11:56 AM   NICOTINE      ng/mL    None Detected   COTININE      ng/mL    1.2   Hemoglobin A1c, (calculated)      4.8 - 5.6 % 5.5      Estimated average glucose      mg/dL 111      TSH      0.450 - 4.500 uIU/mL   1.870    VITAMIN D, 25-HYDROXY      30.0 - 100.0 ng/mL  32.5         CARDIAC CLEARANCE:  Completed 06/30/2020 by Dr Dwight Gordon of [facility]  IMPRESSION:   Hypertensive emergency in the setting of acute severe sinus congestion with peripheral vertigo and decongestant use. BP since discharge has been well controlled.   Discussed non pharmacologic means to combat congestion including saline nasal wash (she purchased a Neti pot), nasal strips and low risk to use nasonex or flonase nasal sprays with once daily non sedating antihistamines without decongestants  She remains low perioperative risk for bariatric surgery and will continue to monitor BP at home. UPPER GI:  Completed 12/19/2019  Clinical History: The patient is a 60years year old Female presenting with  symptoms of H/O Reflux  Comparison:  None  Findings:    Fluoroscopic and film studies demonstrate the esophagus to be normal in course,  contour, caliber. There is no evidence of mucosal irregularity. No evidence of  hiatal hernia or gastroesophageal reflux is demonstrated during the exam. There  are few tertiary contractions. Both supine and upright imaging. The stomach is normal in position with unobstructed gastric emptying into the  proximal small bowel. No mucosal irregularity is demonstrated. The duodenal bulb is unremarkable as are visualized portions of the proximal  small bowel. Total images: 86  Total fluoroscopy time: 1.1 minutes  Total dose: 133 mGy     IMPRESSION  Impression:  1. Few tertiary contractions throughout the esophagus, with otherwise  unremarkable evaluation. We have reviewed the procedure again today and completed our standard pre op teaching. Her questions were answered and she is ready to schedule surgery. We have discussed the procedure, diet and exercise regimens, and potential complications of surgery. The patient understands the nature and potential complication of surgery and has the capacity to follow the post operative diet, exercise, and nutritional requirements. After review, she has signed her surgical consent today. MEDICAL HISTORY:  Morbid Obesity  Severe Headaches   Diabetic Neuropathy  PVD  H/O Stoke (2007)- Some LLE residual without use of an assistive device.    H/O MI with heart cath (2005)     Comorbidity Yes or No Obstructive Sleep Apnea  CPAP/BIPAP use= No   Diabetes Mellitus  Insulin dependent =No  Last A1c=5.7 (08/19) Yes   Hypertension-  Number of medications=3 Yes   Gastroesophageal Reflux  Treatment Med= No   Hyperlipidemia with medication Yes   Coronary Artery Disease No   Cancer No   Asthma No   Osteoarthritis Yes   Joint Pain Yes         Other Yes or No   Venous Stasis No   Dialysis No   Long term use of steroids or immunocompromised conditions No   On Anticoagulants No            PRIOR WEIGHT LOSS ATTEMPTS:  >15 attempts including [employer], Weight Watchers, Herbalife, Atkins, Slim Fast     EXERCISE ASSESSMENT:  Pilates 3x/wk for ~1 hour, NIH Guidelines reviewed     DENTAL ISSUES:  No dental issues with chewing, wears full dentures      PSYCHOSOCIAL:  She notes she  is  and states main support person is Olya Delvalle ().  She is employed as a CNA part time.  Her goal in pursuing surgical weight loss is to improve health. She  reports appropriate treatment of depression and anxiety.  She states she is independent in her care, can drive a car, and can ambulate without assistance. HISTORY:  Past Medical History:   Diagnosis Date    Congestive heart failure ([address]     2007 when pt had stroke    Diabetic polyneuropathy associated with type 2 diabetes mellitus ([address] 05/14/2019    Diverticulitis     Dyslipidemia     Hypertension     managed with meds     Neuropathy 5/14/2019    Numbness of right foot 5/14/2019    OA (osteoarthritis)     Peripheral vascular disease ([address] 5/14/2019    Shingles 2019    Sleep apnea     No c-pap     Stroke ([address]     2007- slight limp on LLE-  no use of asst device    Type 2 diabetes with nephropathy ([address] 08/06/2020    oral meds, average BS , last A1C 5.5 in 8/2020, Hypoglycemic episodes at 52    Vertigo        She denies personal or family history of problems with anesthesia, bleeding, or clotting. She denies history of DVT or pulmonary embolism.   She denies reflux or dysphagia. Past Surgical History:   Procedure Laterality Date    COLONOSCOPY N/A 2018    COLONOSCOPY/ 41 performed by Luis [facility]pperson [facility] ENDOSCOPY    HX COLONOSCOPY      HX CYST INCISION AND DRAINAGE      Left breast     HX HEART CATHETERIZATION      HX HYSTERECTOMY      TOTAL     Social History     Tobacco Use    Smoking status: Former Smoker     Packs/day: 0.50     Years: 24.00     Pack years: 12.00     Last attempt to quit: 1999     Years since quittin.0    Smokeless tobacco: Never Used   Substance Use Topics    Alcohol use: No    Drug use: No     Family History   Problem Relation Age of Onset    Sudden Death Sister 43        MI    Stroke Mother 78    Heart Disease Mother 77        \"silent heart attack\"    Hypertension Mother     Diabetes Father     Hypertension Brother     Hypertension Sister     Hypertension Brother         MEDICATIONS:  No current facility-administered medications for this encounter. Current Outpatient Medications   Medication Sig    omeprazole (PRILOSEC) 40 mg capsule Take 1 Cap by mouth daily. Indications: gastroesophageal reflux disease    ondansetron hcl (ZOFRAN) 8 mg tablet Take 1 Tab by mouth every eight (8) hours as needed for Nausea or Vomiting. Indications: prevent nausea and vomiting after surgery    metFORMIN ER (GLUCOPHAGE XR) 500 mg tablet Take 1 Tab by mouth two (2) times daily (with meals).  atenoloL (TENORMIN) 100 mg tablet Take 1 Tab by mouth two (2) times a day.  potassium chloride (K-DUR, KLOR-CON) 20 mEq tablet Take 1 Tab by mouth daily as needed (with lasix for leg swelling).  furosemide (LASIX) 20 mg tablet Take 1 Tab by mouth daily as needed (leg swelling).  pravastatin (PRAVACHOL) 80 mg tablet Take 1 Tab by mouth nightly.  lisinopril-hydroCHLOROthiazide (PRINZIDE, ZESTORETIC) 10-12.5 mg per tablet Take 1 Tab by mouth two (2) times a day.     meclizine (ANTIVERT) 25 mg tablet Take 1 Tab by mouth three (3) times daily as needed for Dizziness.  Blood Pressure Monitor (Blood Pressure Kit) kit Check blood pressure daily    ergocalciferol (ERGOCALCIFEROL) 1,250 mcg (50,000 unit) capsule Take 1 capsule twice a week x 4 weeks, then once a week thereafter    meloxicam (MOBIC) 7.5 mg tablet Take 1 Tab by mouth daily.  glucose blood VI test strips (ACCU-CHEK SANTINO PLUS TEST STRP) strip Test up to 2 times a day    Blood-Glucose Meter misc 1 Units by Does Not Apply route DIALYSIS PRN. ALLERGIES:  Allergies   Allergen Reactions    Codeine Swelling     Severe Facial swelling       ROS: The patient has no difficulty with chest pain or shortness of breath. No fever or chills. Comprehensive 13 point review of systems was otherwise unremarkable except as noted above. PHYSICAL EXAM:   There were no vitals taken for this visit. General: Alert oriented cooperative black female in no acute distress. Eyes: Sclera are clear. Conjunctiva and lids within normal limits. No icterus. Ears and Nose: no gross deformities to visual inspection, gross hearing intact  Neck: Supple, trachea midline, no appreciable thyromegaly  Resp: No JVD. Breathing is  non-labored. Lungs clear to auscultation without wheezing or rhonchi   CV: RRR. No murmurs, rubs or gallops appreciated, Carotid bruits are absent  Abd: soft non-tender and non-distended without peritoneal signs. +bs    Psych:  Mood and affect appropriate. Short-term memory and understanding intact      ASSESSMENT:  Morbid obesity with a  There is no height or weight on file to calculate BMI. ready for Laparoscopic Sleeve Gastrectomy surgery. 30-day Bariatric Surgical Risk Percentage: 6.40%    PLAN:  1.  Schedule for laparoscopic, possible open, sleeve gastrectomy, in the near future. 2.  Patient will complete our 2 week pre operative diet.   3.  Bring incentive spirometer( given with our standard instruction to use BID 2 weeks prior to surgery) to hospital on day of surgery. 4.  The patient received prescription to use after surgery for:  Zofran, and Prilosec. I went over the risks and benefits with the patient. For risks I went over problems with bleeding, infection and anesthesia. I also noted potential problems of injury to the esophagus, liver, spleen, stomach, pancreas, small bowel and or colon. I addition, I discussed potential problems of DVT/pulmonary embolism, urinary tract infection, wound infection, myocardial infarction, stroke and the potential need to convert to an open procedure. I quoted a 1% nationwide mortality rate for this procedure. She has signed our extensive consent form which is in the chart.      Tonya Leon MD    Date:  11/11/2020

## 2020-11-16 ENCOUNTER — ANESTHESIA EVENT (OUTPATIENT)
Dept: SURGERY | Age: 65
DRG: 621 | End: 2020-11-16
Payer: COMMERCIAL

## 2020-11-16 NOTE — PERIOP NOTES
Patient drank Pre-Surgical drink as instructed:   2 Pre Surgical drinks before midnight and Pre Surgical drink consumed over 5-10 minutes PTA DOS    Warmer placed on patient's bed. Warm IV fluids infusing in pre-op per ERAS protocol.

## 2020-11-17 ENCOUNTER — HOSPITAL ENCOUNTER (INPATIENT)
Age: 65
LOS: 2 days | Discharge: HOME | DRG: 621 | End: 2020-11-19
Attending: SURGERY | Admitting: SURGERY
Payer: COMMERCIAL

## 2020-11-17 ENCOUNTER — ANESTHESIA (OUTPATIENT)
Dept: SURGERY | Age: 65
DRG: 621 | End: 2020-11-17
Payer: COMMERCIAL

## 2020-11-17 DIAGNOSIS — E66.01 MORBID OBESITY (HCC): Primary | ICD-10-CM

## 2020-11-17 LAB
ABO + RH BLD: NORMAL
BLOOD GROUP ANTIBODIES SERPL: NORMAL
GLUCOSE BLD STRIP.AUTO-MCNC: 115 MG/DL (ref 65–100)
GLUCOSE BLD STRIP.AUTO-MCNC: 129 MG/DL (ref 65–100)
SPECIMEN EXP DATE BLD: NORMAL

## 2020-11-17 PROCEDURE — 77030012770 HC TRCR OPT FX AMR -B: Performed by: SURGERY

## 2020-11-17 PROCEDURE — 74011250637 HC RX REV CODE- 250/637: Performed by: NURSE PRACTITIONER

## 2020-11-17 PROCEDURE — 77030008608 HC TRCR ENDOSC SMTH AMR -B: Performed by: SURGERY

## 2020-11-17 PROCEDURE — 76010000162 HC OR TIME 1.5 TO 2 HR INTENSV-TIER 1: Performed by: SURGERY

## 2020-11-17 PROCEDURE — 77030010507 HC ADH SKN DERMBND J&J -B: Performed by: SURGERY

## 2020-11-17 PROCEDURE — 74011000250 HC RX REV CODE- 250: Performed by: REGISTERED NURSE

## 2020-11-17 PROCEDURE — 86900 BLOOD TYPING SEROLOGIC ABO: CPT

## 2020-11-17 PROCEDURE — 76060000034 HC ANESTHESIA 1.5 TO 2 HR: Performed by: SURGERY

## 2020-11-17 PROCEDURE — 77030020829: Performed by: SURGERY

## 2020-11-17 PROCEDURE — 77030008756 HC TU IRR SUC STRY -B: Performed by: SURGERY

## 2020-11-17 PROCEDURE — 77030008606 HC TRCR ENDOSC KII AMR -B: Performed by: SURGERY

## 2020-11-17 PROCEDURE — 77030040361 HC SLV COMPR DVT MDII -B: Performed by: SURGERY

## 2020-11-17 PROCEDURE — 77030007956 HC PCH ENDOSC SPEC COVD -C: Performed by: SURGERY

## 2020-11-17 PROCEDURE — 74011250637 HC RX REV CODE- 250/637: Performed by: SURGERY

## 2020-11-17 PROCEDURE — 77030008603 HC TRCR ENDOSC EPATH J&J -C: Performed by: SURGERY

## 2020-11-17 PROCEDURE — 74011250636 HC RX REV CODE- 250/636: Performed by: ANESTHESIOLOGY

## 2020-11-17 PROCEDURE — 77030027876 HC STPLR ENDOSC FLX PWR J&J -G1: Performed by: SURGERY

## 2020-11-17 PROCEDURE — 77030039425 HC BLD LARYNG TRULITE DISP TELE -A: Performed by: ANESTHESIOLOGY

## 2020-11-17 PROCEDURE — 36415 COLL VENOUS BLD VENIPUNCTURE: CPT

## 2020-11-17 PROCEDURE — 74011250637 HC RX REV CODE- 250/637: Performed by: ANESTHESIOLOGY

## 2020-11-17 PROCEDURE — 43775 LAP SLEEVE GASTRECTOMY: CPT | Performed by: SURGERY

## 2020-11-17 PROCEDURE — 74011250636 HC RX REV CODE- 250/636: Performed by: NURSE PRACTITIONER

## 2020-11-17 PROCEDURE — 74011000250 HC RX REV CODE- 250: Performed by: SURGERY

## 2020-11-17 PROCEDURE — 77030009968 HC RELD STPLR ENDOSC J&J -D: Performed by: SURGERY

## 2020-11-17 PROCEDURE — 77030037088 HC TUBE ENDOTRACH ORAL NSL COVD-A: Performed by: ANESTHESIOLOGY

## 2020-11-17 PROCEDURE — 77030041524 HC SEALNT FIBRN VITASEAL J&J -C: Performed by: SURGERY

## 2020-11-17 PROCEDURE — 77030010515 HC APPL ENDOCLP LIG J&J -B: Performed by: SURGERY

## 2020-11-17 PROCEDURE — 77030034208 HC SLV GASTRCTMY 3D CAL SYS DISP BOEH -C: Performed by: SURGERY

## 2020-11-17 PROCEDURE — 0DB64Z3 EXCISION OF STOMACH, PERCUTANEOUS ENDOSCOPIC APPROACH, VERTICAL: ICD-10-PCS | Performed by: SURGERY

## 2020-11-17 PROCEDURE — 2709999900 HC NON-CHARGEABLE SUPPLY: Performed by: SURGERY

## 2020-11-17 PROCEDURE — 77030002912 HC SUT ETHBND J&J -A: Performed by: SURGERY

## 2020-11-17 PROCEDURE — 82962 GLUCOSE BLOOD TEST: CPT

## 2020-11-17 PROCEDURE — 74011250636 HC RX REV CODE- 250/636: Performed by: REGISTERED NURSE

## 2020-11-17 PROCEDURE — 65270000029 HC RM PRIVATE

## 2020-11-17 PROCEDURE — 77030008518 HC TBNG INSUF ENDO STRY -B: Performed by: SURGERY

## 2020-11-17 PROCEDURE — 88312 SPECIAL STAINS GROUP 1: CPT

## 2020-11-17 PROCEDURE — 77030034156 HC DEV TISS ENSEAL G2 STR J&J -F: Performed by: SURGERY

## 2020-11-17 PROCEDURE — 77030040922 HC BLNKT HYPOTHRM STRY -A: Performed by: ANESTHESIOLOGY

## 2020-11-17 PROCEDURE — 76210000007 HC OR PH I REC 5.5 TO 6 HR: Performed by: SURGERY

## 2020-11-17 PROCEDURE — 77030002933 HC SUT MCRYL J&J -A: Performed by: SURGERY

## 2020-11-17 PROCEDURE — 77030041460 HC APL VISTASEAL J&J -B: Performed by: SURGERY

## 2020-11-17 PROCEDURE — 88305 TISSUE EXAM BY PATHOLOGIST: CPT

## 2020-11-17 RX ORDER — HYDROMORPHONE HYDROCHLORIDE 2 MG/ML
0.5 INJECTION, SOLUTION INTRAMUSCULAR; INTRAVENOUS; SUBCUTANEOUS
Status: COMPLETED | OUTPATIENT
Start: 2020-11-17 | End: 2020-11-17

## 2020-11-17 RX ORDER — CEFAZOLIN SODIUM/WATER 2 G/20 ML
2 SYRINGE (ML) INTRAVENOUS ONCE
Status: COMPLETED | OUTPATIENT
Start: 2020-11-17 | End: 2020-11-17

## 2020-11-17 RX ORDER — KETAMINE HYDROCHLORIDE 50 MG/ML
INJECTION, SOLUTION INTRAMUSCULAR; INTRAVENOUS AS NEEDED
Status: DISCONTINUED | OUTPATIENT
Start: 2020-11-17 | End: 2020-11-17 | Stop reason: HOSPADM

## 2020-11-17 RX ORDER — ACETAMINOPHEN 500 MG
1000 TABLET ORAL EVERY 6 HOURS
Status: DISCONTINUED | OUTPATIENT
Start: 2020-11-17 | End: 2020-11-19 | Stop reason: HOSPADM

## 2020-11-17 RX ORDER — MIDAZOLAM HYDROCHLORIDE 1 MG/ML
2 INJECTION, SOLUTION INTRAMUSCULAR; INTRAVENOUS ONCE
Status: DISPENSED | OUTPATIENT
Start: 2020-11-17 | End: 2020-11-17

## 2020-11-17 RX ORDER — FENTANYL CITRATE 50 UG/ML
100 INJECTION, SOLUTION INTRAMUSCULAR; INTRAVENOUS ONCE
Status: ACTIVE | OUTPATIENT
Start: 2020-11-17 | End: 2020-11-17

## 2020-11-17 RX ORDER — FENTANYL CITRATE 50 UG/ML
INJECTION, SOLUTION INTRAMUSCULAR; INTRAVENOUS AS NEEDED
Status: DISCONTINUED | OUTPATIENT
Start: 2020-11-17 | End: 2020-11-17 | Stop reason: HOSPADM

## 2020-11-17 RX ORDER — MIDAZOLAM HYDROCHLORIDE 1 MG/ML
2 INJECTION, SOLUTION INTRAMUSCULAR; INTRAVENOUS ONCE
Status: COMPLETED | OUTPATIENT
Start: 2020-11-17 | End: 2020-11-17

## 2020-11-17 RX ORDER — DIPHENHYDRAMINE HYDROCHLORIDE 50 MG/ML
12.5 INJECTION, SOLUTION INTRAMUSCULAR; INTRAVENOUS
Status: DISCONTINUED | OUTPATIENT
Start: 2020-11-17 | End: 2020-11-19 | Stop reason: HOSPADM

## 2020-11-17 RX ORDER — OXYCODONE AND ACETAMINOPHEN 5; 325 MG/1; MG/1
2 TABLET ORAL
Status: DISCONTINUED | OUTPATIENT
Start: 2020-11-17 | End: 2020-11-17 | Stop reason: ALTCHOICE

## 2020-11-17 RX ORDER — ENALAPRILAT 1.25 MG/ML
1.25 INJECTION INTRAVENOUS
Status: DISCONTINUED | OUTPATIENT
Start: 2020-11-17 | End: 2020-11-19 | Stop reason: HOSPADM

## 2020-11-17 RX ORDER — LIDOCAINE HYDROCHLORIDE 10 MG/ML
0.1 INJECTION INFILTRATION; PERINEURAL AS NEEDED
Status: DISCONTINUED | OUTPATIENT
Start: 2020-11-17 | End: 2020-11-19 | Stop reason: HOSPADM

## 2020-11-17 RX ORDER — DEXAMETHASONE SODIUM PHOSPHATE 4 MG/ML
INJECTION, SOLUTION INTRA-ARTICULAR; INTRALESIONAL; INTRAMUSCULAR; INTRAVENOUS; SOFT TISSUE AS NEEDED
Status: DISCONTINUED | OUTPATIENT
Start: 2020-11-17 | End: 2020-11-17 | Stop reason: HOSPADM

## 2020-11-17 RX ORDER — METOCLOPRAMIDE 10 MG/1
5 TABLET ORAL ONCE
Status: COMPLETED | OUTPATIENT
Start: 2020-11-17 | End: 2020-11-17

## 2020-11-17 RX ORDER — PANTOPRAZOLE SODIUM 40 MG/10ML
40 INJECTION, POWDER, LYOPHILIZED, FOR SOLUTION INTRAVENOUS DAILY
Status: DISCONTINUED | OUTPATIENT
Start: 2020-11-18 | End: 2020-11-17 | Stop reason: SDUPTHER

## 2020-11-17 RX ORDER — OXYCODONE HYDROCHLORIDE 5 MG/1
5 TABLET ORAL
Status: DISCONTINUED | OUTPATIENT
Start: 2020-11-17 | End: 2020-11-17 | Stop reason: HOSPADM

## 2020-11-17 RX ORDER — OXYCODONE HYDROCHLORIDE 5 MG/1
10 TABLET ORAL
Status: DISCONTINUED | OUTPATIENT
Start: 2020-11-17 | End: 2020-11-17 | Stop reason: HOSPADM

## 2020-11-17 RX ORDER — SUCRALFATE 1 G/1
1 TABLET ORAL 4 TIMES DAILY
Status: DISCONTINUED | OUTPATIENT
Start: 2020-11-17 | End: 2020-11-19 | Stop reason: HOSPADM

## 2020-11-17 RX ORDER — KETOROLAC TROMETHAMINE 30 MG/ML
INJECTION, SOLUTION INTRAMUSCULAR; INTRAVENOUS AS NEEDED
Status: DISCONTINUED | OUTPATIENT
Start: 2020-11-17 | End: 2020-11-17 | Stop reason: HOSPADM

## 2020-11-17 RX ORDER — SIMETHICONE 80 MG
80 TABLET,CHEWABLE ORAL
Status: DISCONTINUED | OUTPATIENT
Start: 2020-11-17 | End: 2020-11-19 | Stop reason: HOSPADM

## 2020-11-17 RX ORDER — INSULIN LISPRO 100 [IU]/ML
INJECTION, SOLUTION INTRAVENOUS; SUBCUTANEOUS
Status: DISCONTINUED | OUTPATIENT
Start: 2020-11-17 | End: 2020-11-19 | Stop reason: HOSPADM

## 2020-11-17 RX ORDER — GABAPENTIN 100 MG/1
200 CAPSULE ORAL 2 TIMES DAILY
Status: DISCONTINUED | OUTPATIENT
Start: 2020-11-17 | End: 2020-11-19 | Stop reason: HOSPADM

## 2020-11-17 RX ORDER — PRAVASTATIN SODIUM 20 MG/1
80 TABLET ORAL
Status: DISCONTINUED | OUTPATIENT
Start: 2020-11-17 | End: 2020-11-19 | Stop reason: HOSPADM

## 2020-11-17 RX ORDER — BUPIVACAINE HYDROCHLORIDE 5 MG/ML
INJECTION, SOLUTION EPIDURAL; INTRACAUDAL AS NEEDED
Status: DISCONTINUED | OUTPATIENT
Start: 2020-11-17 | End: 2020-11-17 | Stop reason: HOSPADM

## 2020-11-17 RX ORDER — LIDOCAINE HYDROCHLORIDE 20 MG/ML
INJECTION, SOLUTION EPIDURAL; INFILTRATION; INTRACAUDAL; PERINEURAL AS NEEDED
Status: DISCONTINUED | OUTPATIENT
Start: 2020-11-17 | End: 2020-11-17 | Stop reason: HOSPADM

## 2020-11-17 RX ORDER — LIDOCAINE HYDROCHLORIDE ANHYDROUS AND DEXTROSE MONOHYDRATE .4; 5 G/100ML; G/100ML
INJECTION, SOLUTION INTRAVENOUS
Status: DISCONTINUED | OUTPATIENT
Start: 2020-11-17 | End: 2020-11-17 | Stop reason: HOSPADM

## 2020-11-17 RX ORDER — ONDANSETRON 2 MG/ML
4 INJECTION INTRAMUSCULAR; INTRAVENOUS EVERY 4 HOURS
Status: DISCONTINUED | OUTPATIENT
Start: 2020-11-17 | End: 2020-11-19 | Stop reason: HOSPADM

## 2020-11-17 RX ORDER — ATENOLOL 50 MG/1
100 TABLET ORAL ONCE
Status: COMPLETED | OUTPATIENT
Start: 2020-11-17 | End: 2020-11-17

## 2020-11-17 RX ORDER — FAMOTIDINE 20 MG/1
20 TABLET, FILM COATED ORAL ONCE
Status: COMPLETED | OUTPATIENT
Start: 2020-11-17 | End: 2020-11-17

## 2020-11-17 RX ORDER — LIDOCAINE HYDROCHLORIDE ANHYDROUS AND DEXTROSE MONOHYDRATE .4; 5 G/100ML; G/100ML
1 INJECTION, SOLUTION INTRAVENOUS CONTINUOUS
Status: DISCONTINUED | OUTPATIENT
Start: 2020-11-17 | End: 2020-11-18

## 2020-11-17 RX ORDER — APREPITANT 40 MG/1
40 CAPSULE ORAL ONCE
Status: COMPLETED | OUTPATIENT
Start: 2020-11-17 | End: 2020-11-17

## 2020-11-17 RX ORDER — NALOXONE HYDROCHLORIDE 0.4 MG/ML
0.4 INJECTION, SOLUTION INTRAMUSCULAR; INTRAVENOUS; SUBCUTANEOUS AS NEEDED
Status: DISCONTINUED | OUTPATIENT
Start: 2020-11-17 | End: 2020-11-19 | Stop reason: HOSPADM

## 2020-11-17 RX ORDER — SODIUM CHLORIDE, SODIUM LACTATE, POTASSIUM CHLORIDE, CALCIUM CHLORIDE 600; 310; 30; 20 MG/100ML; MG/100ML; MG/100ML; MG/100ML
75 INJECTION, SOLUTION INTRAVENOUS CONTINUOUS
Status: DISPENSED | OUTPATIENT
Start: 2020-11-17 | End: 2020-11-18

## 2020-11-17 RX ORDER — SODIUM CHLORIDE, SODIUM LACTATE, POTASSIUM CHLORIDE, CALCIUM CHLORIDE 600; 310; 30; 20 MG/100ML; MG/100ML; MG/100ML; MG/100ML
75 INJECTION, SOLUTION INTRAVENOUS CONTINUOUS
Status: DISCONTINUED | OUTPATIENT
Start: 2020-11-17 | End: 2020-11-17 | Stop reason: HOSPADM

## 2020-11-17 RX ORDER — SODIUM CHLORIDE AND POTASSIUM CHLORIDE .9; .15 G/100ML; G/100ML
SOLUTION INTRAVENOUS CONTINUOUS
Status: DISCONTINUED | OUTPATIENT
Start: 2020-11-17 | End: 2020-11-19

## 2020-11-17 RX ORDER — ATENOLOL 50 MG/1
100 TABLET ORAL 2 TIMES DAILY
Status: DISCONTINUED | OUTPATIENT
Start: 2020-11-17 | End: 2020-11-19 | Stop reason: HOSPADM

## 2020-11-17 RX ORDER — ONDANSETRON 2 MG/ML
INJECTION INTRAMUSCULAR; INTRAVENOUS AS NEEDED
Status: DISCONTINUED | OUTPATIENT
Start: 2020-11-17 | End: 2020-11-17 | Stop reason: HOSPADM

## 2020-11-17 RX ORDER — HEPARIN SODIUM 5000 [USP'U]/ML
5000 INJECTION, SOLUTION INTRAVENOUS; SUBCUTANEOUS EVERY 8 HOURS
Status: DISCONTINUED | OUTPATIENT
Start: 2020-11-18 | End: 2020-11-19 | Stop reason: HOSPADM

## 2020-11-17 RX ORDER — LISINOPRIL AND HYDROCHLOROTHIAZIDE 10; 12.5 MG/1; MG/1
1 TABLET ORAL 2 TIMES DAILY
Status: DISCONTINUED | OUTPATIENT
Start: 2020-11-17 | End: 2020-11-19 | Stop reason: HOSPADM

## 2020-11-17 RX ORDER — PROPOFOL 10 MG/ML
INJECTION, EMULSION INTRAVENOUS AS NEEDED
Status: DISCONTINUED | OUTPATIENT
Start: 2020-11-17 | End: 2020-11-17 | Stop reason: HOSPADM

## 2020-11-17 RX ORDER — OXYCODONE HYDROCHLORIDE 5 MG/1
5 TABLET ORAL
Status: DISCONTINUED | OUTPATIENT
Start: 2020-11-17 | End: 2020-11-19 | Stop reason: HOSPADM

## 2020-11-17 RX ORDER — HYDROMORPHONE HYDROCHLORIDE 1 MG/ML
0.5 INJECTION, SOLUTION INTRAMUSCULAR; INTRAVENOUS; SUBCUTANEOUS
Status: DISCONTINUED | OUTPATIENT
Start: 2020-11-17 | End: 2020-11-19 | Stop reason: HOSPADM

## 2020-11-17 RX ORDER — OXYCODONE AND ACETAMINOPHEN 5; 325 MG/1; MG/1
1 TABLET ORAL
Status: DISCONTINUED | OUTPATIENT
Start: 2020-11-17 | End: 2020-11-17 | Stop reason: ALTCHOICE

## 2020-11-17 RX ORDER — MECLIZINE HCL 12.5 MG 12.5 MG/1
25 TABLET ORAL
Status: DISCONTINUED | OUTPATIENT
Start: 2020-11-17 | End: 2020-11-19 | Stop reason: HOSPADM

## 2020-11-17 RX ORDER — ROCURONIUM BROMIDE 10 MG/ML
INJECTION, SOLUTION INTRAVENOUS AS NEEDED
Status: DISCONTINUED | OUTPATIENT
Start: 2020-11-17 | End: 2020-11-17 | Stop reason: HOSPADM

## 2020-11-17 RX ORDER — EPHEDRINE SULFATE/0.9% NACL/PF 50 MG/5 ML
SYRINGE (ML) INTRAVENOUS AS NEEDED
Status: DISCONTINUED | OUTPATIENT
Start: 2020-11-17 | End: 2020-11-17 | Stop reason: HOSPADM

## 2020-11-17 RX ORDER — METOPROLOL TARTRATE 5 MG/5ML
1.25 INJECTION INTRAVENOUS
Status: DISCONTINUED | OUTPATIENT
Start: 2020-11-17 | End: 2020-11-19 | Stop reason: HOSPADM

## 2020-11-17 RX ORDER — SCOLOPAMINE TRANSDERMAL SYSTEM 1 MG/1
1 PATCH, EXTENDED RELEASE TRANSDERMAL ONCE
Status: COMPLETED | OUTPATIENT
Start: 2020-11-17 | End: 2020-11-17

## 2020-11-17 RX ADMIN — LIDOCAINE HYDROCHLORIDE 1 MG/KG/HR: 4 INJECTION, SOLUTION INTRAVENOUS at 10:01

## 2020-11-17 RX ADMIN — HYDROMORPHONE HYDROCHLORIDE 0.5 MG: 2 INJECTION INTRAMUSCULAR; INTRAVENOUS; SUBCUTANEOUS at 12:14

## 2020-11-17 RX ADMIN — Medication 10 MG: at 09:53

## 2020-11-17 RX ADMIN — ONDANSETRON 4 MG: 2 INJECTION INTRAMUSCULAR; INTRAVENOUS at 17:34

## 2020-11-17 RX ADMIN — HYDROMORPHONE HYDROCHLORIDE 0.5 MG: 2 INJECTION INTRAMUSCULAR; INTRAVENOUS; SUBCUTANEOUS at 12:08

## 2020-11-17 RX ADMIN — HYDROMORPHONE HYDROCHLORIDE 0.5 MG: 2 INJECTION INTRAMUSCULAR; INTRAVENOUS; SUBCUTANEOUS at 12:35

## 2020-11-17 RX ADMIN — LIDOCAINE HYDROCHLORIDE 100 MG: 20 INJECTION, SOLUTION EPIDURAL; INFILTRATION; INTRACAUDAL; PERINEURAL at 09:48

## 2020-11-17 RX ADMIN — PRAVASTATIN SODIUM 80 MG: 20 TABLET ORAL at 21:23

## 2020-11-17 RX ADMIN — PHENYLEPHRINE HYDROCHLORIDE 150 MCG: 10 INJECTION INTRAVENOUS at 10:06

## 2020-11-17 RX ADMIN — ACETAMINOPHEN 1000 MG: 500 TABLET, FILM COATED ORAL at 23:19

## 2020-11-17 RX ADMIN — KETOROLAC TROMETHAMINE 15 MG: 30 INJECTION, SOLUTION INTRAMUSCULAR; INTRAVENOUS at 10:50

## 2020-11-17 RX ADMIN — ATENOLOL 100 MG: 50 TABLET ORAL at 21:23

## 2020-11-17 RX ADMIN — SUGAMMADEX 100 MG: 100 INJECTION, SOLUTION INTRAVENOUS at 10:53

## 2020-11-17 RX ADMIN — APREPITANT 40 MG: 40 CAPSULE ORAL at 08:20

## 2020-11-17 RX ADMIN — KETAMINE HYDROCHLORIDE 50 MG: 50 INJECTION INTRAMUSCULAR; INTRAVENOUS at 09:48

## 2020-11-17 RX ADMIN — LISINOPRIL AND HYDROCHLOROTHIAZIDE 1 TABLET: 12.5; 1 TABLET ORAL at 17:34

## 2020-11-17 RX ADMIN — FAMOTIDINE 20 MG: 20 TABLET ORAL at 08:19

## 2020-11-17 RX ADMIN — GABAPENTIN 200 MG: 100 CAPSULE ORAL at 17:34

## 2020-11-17 RX ADMIN — Medication 2 G: at 09:58

## 2020-11-17 RX ADMIN — ROCURONIUM BROMIDE 50 MG: 10 INJECTION, SOLUTION INTRAVENOUS at 09:48

## 2020-11-17 RX ADMIN — SUCRALFATE 1 G: 1 TABLET ORAL at 17:34

## 2020-11-17 RX ADMIN — HYDROMORPHONE HYDROCHLORIDE 0.5 MG: 2 INJECTION INTRAMUSCULAR; INTRAVENOUS; SUBCUTANEOUS at 12:40

## 2020-11-17 RX ADMIN — METOCLOPRAMIDE 5 MG: 10 TABLET ORAL at 08:20

## 2020-11-17 RX ADMIN — FENTANYL CITRATE 50 MCG: 50 INJECTION INTRAMUSCULAR; INTRAVENOUS at 09:48

## 2020-11-17 RX ADMIN — SUGAMMADEX 200 MG: 100 INJECTION, SOLUTION INTRAVENOUS at 10:49

## 2020-11-17 RX ADMIN — SODIUM CHLORIDE, SODIUM LACTATE, POTASSIUM CHLORIDE, AND CALCIUM CHLORIDE 75 ML/HR: 600; 310; 30; 20 INJECTION, SOLUTION INTRAVENOUS at 08:43

## 2020-11-17 RX ADMIN — ONDANSETRON 4 MG: 2 INJECTION INTRAMUSCULAR; INTRAVENOUS at 21:23

## 2020-11-17 RX ADMIN — PHENYLEPHRINE HYDROCHLORIDE 100 MCG: 10 INJECTION INTRAVENOUS at 09:53

## 2020-11-17 RX ADMIN — ONDANSETRON 4 MG: 2 INJECTION INTRAMUSCULAR; INTRAVENOUS at 10:41

## 2020-11-17 RX ADMIN — PROPOFOL 150 MG: 10 INJECTION, EMULSION INTRAVENOUS at 09:48

## 2020-11-17 RX ADMIN — LIDOCAINE HYDROCHLORIDE 1 MG/KG/HR: 4 INJECTION, SOLUTION INTRAVENOUS at 11:22

## 2020-11-17 RX ADMIN — SODIUM CHLORIDE, SODIUM LACTATE, POTASSIUM CHLORIDE, AND CALCIUM CHLORIDE 125 ML/HR: 600; 310; 30; 20 INJECTION, SOLUTION INTRAVENOUS at 11:22

## 2020-11-17 RX ADMIN — POTASSIUM CHLORIDE AND SODIUM CHLORIDE: 900; 150 INJECTION, SOLUTION INTRAVENOUS at 17:34

## 2020-11-17 RX ADMIN — MIDAZOLAM 2 MG: 1 INJECTION INTRAMUSCULAR; INTRAVENOUS at 09:05

## 2020-11-17 RX ADMIN — DEXAMETHASONE SODIUM PHOSPHATE 10 MG: 4 INJECTION, SOLUTION INTRAMUSCULAR; INTRAVENOUS at 10:41

## 2020-11-17 RX ADMIN — ACETAMINOPHEN 1000 MG: 500 TABLET, FILM COATED ORAL at 17:34

## 2020-11-17 RX ADMIN — ATENOLOL 100 MG: 50 TABLET ORAL at 08:25

## 2020-11-17 NOTE — ANESTHESIA PREPROCEDURE EVALUATION
Anesthetic History   No history of anesthetic complications            Review of Systems / Medical History  Patient summary reviewed, nursing notes reviewed and pertinent labs reviewed    Pulmonary    COPD: mild    Sleep apnea: No treatment  Shortness of breath and smoker (quit 22 years)         Neuro/Psych       CVA (had some left sided weakness, resolved)       Cardiovascular    Hypertension: well controlled          Past MI (2003, no intervention, no angina since.) and CAD    Exercise tolerance: <4 METS     GI/Hepatic/Renal     GERD: well controlled           Endo/Other    Diabetes: well controlled    Morbid obesity and arthritis     Other Findings              Physical Exam    Airway  Mallampati: II  TM Distance: > 6 cm  Neck ROM: normal range of motion   Mouth opening: Diminished (comment)     Cardiovascular  Regular rate and rhythm,  S1 and S2 normal,  no murmur, click, rub, or gallop  Rhythm: regular  Rate: normal         Dental    Dentition: Full lower dentures, Full upper dentures and Edentulous     Pulmonary  Breath sounds clear to auscultation               Abdominal         Other Findings            Anesthetic Plan    ASA: 3  Anesthesia type: general          Induction: Intravenous  Anesthetic plan and risks discussed with: Patient

## 2020-11-17 NOTE — PROGRESS NOTES
11/17/20 1745   Dual Skin Pressure Injury Assessment   Dual Skin Pressure Injury Assessment WDL   Second Care Provider (Based on [address] [place] [address]) Jasen Mireles RN   Skin Integumentary   Skin Integumentary (WDL) X    Pressure  Injury Documentation No Pressure Injury Noted-Pressure Ulcer Prevention Initiated   Skin Color Appropriate for ethnicity   Skin Condition/Temp Warm;Dry   Skin Integrity Incision (comment)  (lap sites)       Dual skin assessment preformed with Jasen Mireles RN. No pressure injuries noted.

## 2020-11-17 NOTE — ANESTHESIA POSTPROCEDURE EVALUATION
Procedure(s):  ERAS / GASTRECTOMY SLEEVE LAPAROSCOPIC.     general    Anesthesia Post Evaluation      Multimodal analgesia: multimodal analgesia not used between 6 hours prior to anesthesia start to PACU discharge  Patient location during evaluation: PACU  Patient participation: complete - patient participated  Level of consciousness: awake and alert  Pain management: adequate  Airway patency: patent  Anesthetic complications: no  Cardiovascular status: hemodynamically stable  Respiratory status: acceptable  Hydration status: acceptable        INITIAL Post-op Vital signs:   Vitals Value Taken Time   /74 11/17/2020 11:37 AM   Temp 36.4 °C (97.6 °F) 11/17/2020 11:22 AM   Pulse 66 11/17/2020 11:37 AM   Resp 20 11/17/2020 11:37 AM   SpO2 99 % 11/17/2020 11:37 AM

## 2020-11-17 NOTE — PERIOP NOTES
David Reese is with patient today. He will be waiting in the waiting room.  His phone number is [phone]

## 2020-11-17 NOTE — PROGRESS NOTES
Scout Pardo NP with Dr. Anjelica Allen at patient bedside to assess patient status.  
 
Administered 60 ml water orally per Harold Kayser, NP.

## 2020-11-17 NOTE — OP NOTES
Laparascopic Sleeve Gastrectomy Operative Report    Date of Surgery: 11/17/2020     Preoperative Diagnosis: Morbid Obesity with a BMI of 41    Postoperative Diagnosis: Same as the above    Procedure: LAPAROSCOPIC GASTRIC SLEEVE    Surgeon(s) and Role:     Lord Herberth MD - Primary     Anesthesia:  GETA plus local     NAME OF PROCEDURE: LAPAROSCOPIC SLEEVE GASTRECTOMY    ESTIMATED BLOOD LOSS: 50 mL. SPECIMENS: Segment of the stomach. HISTORY: This is a 63 y.o. female who came to the surgical weight loss  clinic at [facility] of her own volition for consideration of bariatric surgery. The patient went to an information session, met with the dietician and psychologist. she came in and I discussed a gastric bypass versus a sleeve gastrectomy with the patient. I recommended a sleeve gastrectomy. The  patient agreed and signed a consent form. For risks, I mentioned risks of bleeding, infection, anesthesia, injury to the esophagus, stomach, small bowel, liver, spleen, large bowel. I also went through risks of myocardial infarction, pneumonia and leak from the staple line of the sleeve gastrectomy. I said that a leak could produce peritonitis, multi-system organ failure, and even death. There is a 1% nationwide mortality rate for this procedure. The patient understood and still wished to proceed. PROCEDURE IN DETAIL: The patient was brought to an operating room at the [address] [place] [address] where general endotracheal anesthesia was administered without complications. She received 2 grams of Ancef as prophylactic antibiotic coverage. The nursing staff applied sequential compression devices and inserted a Pacheco catheter. The abdomen was prepped and draped in the usual sterile manner. An incision was made superior into the patient's left of the umbilicus overlying the left rectus abdominis muscle.  An Optiview trocar was placed in the peritoneal cavity under direct vision with 0 degree 10 mm scope. Once in the peritoneal cavity, the abdomen was insufflated to 15 mmHg using carbon dioxide gas. The table was placed in reverse Trendelenburg position. A 5 and 15 mm trocar placed in the right upper quadrant under direct vision. A 5 mm trocar was placed in the epigastric region and then removed. Through this tract, a Freddy liver retractor was placed and used to retract the left lobe of the liver throughout the remainder of the procedure. It was attached to an Omni self-retaining retracting device. A 5 mm trocar was placed in the left upper quadrant to be used for retraction throughout the procedure. We found the pylorus and measured 6 cm proximal to the pylorus in an area along the greater curvature. The nurse anesthetist placed a 40 Fr Visi-G bougie/calibration tube which was manipulated along the lesser curvature. The device was attached to suction which fixed it in place. We made a small aperture with the Enseal Trio device. We then took the gastrocolic ligament from this point all the way up to the left milana of the diaphragm. We cleared off some posterior adhesions with the Enseal device as well. We then used the Meadow 60 stapling device. The first cartridge was a black staple cartridge. We were able to push the bougie into the pre-pyloric channel and along the lesser curvature, where it remained. The next  staple cartridge was green followed by blue cartridges until the stomach was completely divided along the calibration tube. We checked the staple line. Some small bleeding points were cauterized using electrocautery with the spatula. The remnant of the stomach had a stitch of 0 Ethibond placed in the antrum area and the excised stomach was then placed into a large [employer]. The Endopouch was closed and brought up through the 15 mm trocar incision which was a enlarged with the electrocautery. The bag was removed including the excised stomach which was sent to pathology.  The 15 mm trocar was returned. We checked the staple line again and there was no evidence of bleeding. The bougie tube was removed and then replaced. It passed easily from the distal esophagus into the proximal stomach and was advanced back into the pre-pyloric channel under direct vision. There was no evidence of any staple line leak or bleeding. There was  evidence of any bleeding along the staple line which was stopped with 10 mm clips and spot cautery. The bougie was removed for the last time. We removed all the trocars under direct vision with no evidence of bleeding from the trocar sites. The fascia of the 15 mm trocar was closed with interrupted sutures of 0 Vicryl. The skin was closed with subcuticular sutures of 4-0 Monocryl. The patient received 30 mL 0.5% Marcaine in 5 mL doses to each incision site. Mastisol and Steri-Strips were placed over the wounds. The patient tolerated procedure well. She was extubated and brought to the recovery room in stable condition.         Alessandro Lara MD

## 2020-11-17 NOTE — PROGRESS NOTES
Patient looks good and is doing well. She is still in PACU due to no availability of beds on the Med/Surg floor. /60   Pulse 66   Temp 97.6 °F (36.4 °C)   Resp 16   Wt 239 lb 6.4 oz (108.6 kg)   SpO2 95%   BMI 41.09 kg/m²   Vital signs are stable. Jaida Douglass is drowsy and groggy upon awaking. Pain in well controlled with little discomfort. No nausea at this time. Reminded to ask for pain and anti-nausea medications as needed. Incision sites are dry, intact, without presence of erythema, infection, or allergic reaction  Lungs were clear to auscultate. S1, S2 heard without murmur present. LE's without edema, pedal pulses present. Because she doesn't have a room, the Patient has not been up briefly with PT or been able to use IS. She has not voided. Just started clear liquids on my visit @1515.     Iftikhar Dumont NP

## 2020-11-18 LAB
ANION GAP SERPL CALC-SCNC: 6 MMOL/L (ref 7–16)
BASOPHILS # BLD: 0 K/UL (ref 0–0.2)
BASOPHILS NFR BLD: 0 % (ref 0–2)
BUN SERPL-MCNC: 21 MG/DL (ref 8–23)
CALCIUM SERPL-MCNC: 9 MG/DL (ref 8.3–10.4)
CHLORIDE SERPL-SCNC: 105 MMOL/L (ref 98–107)
CO2 SERPL-SCNC: 27 MMOL/L (ref 21–32)
CREAT SERPL-MCNC: 1.45 MG/DL (ref 0.6–1)
DIFFERENTIAL METHOD BLD: ABNORMAL
EOSINOPHIL # BLD: 0 K/UL (ref 0–0.8)
EOSINOPHIL NFR BLD: 0 % (ref 0.5–7.8)
ERYTHROCYTE [DISTWIDTH] IN BLOOD BY AUTOMATED COUNT: 13.7 % (ref 11.9–14.6)
GLUCOSE BLD STRIP.AUTO-MCNC: 105 MG/DL (ref 65–100)
GLUCOSE BLD STRIP.AUTO-MCNC: 125 MG/DL (ref 65–100)
GLUCOSE BLD STRIP.AUTO-MCNC: 130 MG/DL (ref 65–100)
GLUCOSE BLD STRIP.AUTO-MCNC: 94 MG/DL (ref 65–100)
GLUCOSE SERPL-MCNC: 122 MG/DL (ref 65–100)
HCT VFR BLD AUTO: 37 % (ref 35.8–46.3)
HGB BLD-MCNC: 11.8 G/DL (ref 11.7–15.4)
IMM GRANULOCYTES # BLD AUTO: 0 K/UL (ref 0–0.5)
IMM GRANULOCYTES NFR BLD AUTO: 0 % (ref 0–5)
LYMPHOCYTES # BLD: 0.9 K/UL (ref 0.5–4.6)
LYMPHOCYTES NFR BLD: 13 % (ref 13–44)
MCH RBC QN AUTO: 27.5 PG (ref 26.1–32.9)
MCHC RBC AUTO-ENTMCNC: 31.9 G/DL (ref 31.4–35)
MCV RBC AUTO: 86.2 FL (ref 79.6–97.8)
MONOCYTES # BLD: 0.2 K/UL (ref 0.1–1.3)
MONOCYTES NFR BLD: 3 % (ref 4–12)
NEUTS SEG # BLD: 5.6 K/UL (ref 1.7–8.2)
NEUTS SEG NFR BLD: 84 % (ref 43–78)
NRBC # BLD: 0 K/UL (ref 0–0.2)
PLATELET # BLD AUTO: 201 K/UL (ref 150–450)
PMV BLD AUTO: 11.7 FL (ref 9.4–12.3)
POTASSIUM SERPL-SCNC: 4.1 MMOL/L (ref 3.5–5.1)
RBC # BLD AUTO: 4.29 M/UL (ref 4.05–5.2)
SODIUM SERPL-SCNC: 138 MMOL/L (ref 136–145)
WBC # BLD AUTO: 6.6 K/UL (ref 4.3–11.1)

## 2020-11-18 PROCEDURE — 74011250637 HC RX REV CODE- 250/637: Performed by: NURSE PRACTITIONER

## 2020-11-18 PROCEDURE — 80048 BASIC METABOLIC PNL TOTAL CA: CPT

## 2020-11-18 PROCEDURE — 65270000029 HC RM PRIVATE

## 2020-11-18 PROCEDURE — 74011250637 HC RX REV CODE- 250/637: Performed by: SURGERY

## 2020-11-18 PROCEDURE — 97161 PT EVAL LOW COMPLEX 20 MIN: CPT

## 2020-11-18 PROCEDURE — 85025 COMPLETE CBC W/AUTO DIFF WBC: CPT

## 2020-11-18 PROCEDURE — C9113 INJ PANTOPRAZOLE SODIUM, VIA: HCPCS | Performed by: SURGERY

## 2020-11-18 PROCEDURE — 74011250636 HC RX REV CODE- 250/636: Performed by: SURGERY

## 2020-11-18 PROCEDURE — 74011250636 HC RX REV CODE- 250/636: Performed by: NURSE PRACTITIONER

## 2020-11-18 PROCEDURE — 36415 COLL VENOUS BLD VENIPUNCTURE: CPT

## 2020-11-18 PROCEDURE — 82962 GLUCOSE BLOOD TEST: CPT

## 2020-11-18 PROCEDURE — 51798 US URINE CAPACITY MEASURE: CPT

## 2020-11-18 PROCEDURE — 2709999900 HC NON-CHARGEABLE SUPPLY

## 2020-11-18 PROCEDURE — 97110 THERAPEUTIC EXERCISES: CPT

## 2020-11-18 PROCEDURE — 74011000250 HC RX REV CODE- 250: Performed by: SURGERY

## 2020-11-18 RX ORDER — FUROSEMIDE 20 MG/1
20 TABLET ORAL
Status: DISCONTINUED | OUTPATIENT
Start: 2020-11-18 | End: 2020-11-19 | Stop reason: HOSPADM

## 2020-11-18 RX ADMIN — SUCRALFATE 1 G: 1 TABLET ORAL at 08:23

## 2020-11-18 RX ADMIN — ACETAMINOPHEN 1000 MG: 500 TABLET, FILM COATED ORAL at 08:36

## 2020-11-18 RX ADMIN — ONDANSETRON 4 MG: 2 INJECTION INTRAMUSCULAR; INTRAVENOUS at 08:24

## 2020-11-18 RX ADMIN — LISINOPRIL AND HYDROCHLOROTHIAZIDE 1 TABLET: 12.5; 1 TABLET ORAL at 08:23

## 2020-11-18 RX ADMIN — SUCRALFATE 1 G: 1 TABLET ORAL at 17:09

## 2020-11-18 RX ADMIN — ONDANSETRON 4 MG: 2 INJECTION INTRAMUSCULAR; INTRAVENOUS at 14:04

## 2020-11-18 RX ADMIN — ONDANSETRON 4 MG: 2 INJECTION INTRAMUSCULAR; INTRAVENOUS at 17:09

## 2020-11-18 RX ADMIN — ONDANSETRON 4 MG: 2 INJECTION INTRAMUSCULAR; INTRAVENOUS at 05:09

## 2020-11-18 RX ADMIN — ONDANSETRON 4 MG: 2 INJECTION INTRAMUSCULAR; INTRAVENOUS at 01:40

## 2020-11-18 RX ADMIN — POTASSIUM CHLORIDE AND SODIUM CHLORIDE: 900; 150 INJECTION, SOLUTION INTRAVENOUS at 01:40

## 2020-11-18 RX ADMIN — SUCRALFATE 1 G: 1 TABLET ORAL at 14:04

## 2020-11-18 RX ADMIN — GABAPENTIN 200 MG: 100 CAPSULE ORAL at 17:08

## 2020-11-18 RX ADMIN — PRAVASTATIN SODIUM 80 MG: 20 TABLET ORAL at 21:05

## 2020-11-18 RX ADMIN — ACETAMINOPHEN 1000 MG: 500 TABLET, FILM COATED ORAL at 05:09

## 2020-11-18 RX ADMIN — SUCRALFATE 1 G: 1 TABLET ORAL at 21:05

## 2020-11-18 RX ADMIN — GABAPENTIN 200 MG: 100 CAPSULE ORAL at 08:23

## 2020-11-18 RX ADMIN — SODIUM CHLORIDE 40 MG: 9 INJECTION, SOLUTION INTRAMUSCULAR; INTRAVENOUS; SUBCUTANEOUS at 08:24

## 2020-11-18 RX ADMIN — ATENOLOL 100 MG: 50 TABLET ORAL at 08:23

## 2020-11-18 RX ADMIN — HEPARIN SODIUM 5000 UNITS: 5000 INJECTION INTRAVENOUS; SUBCUTANEOUS at 08:24

## 2020-11-18 RX ADMIN — ONDANSETRON 4 MG: 2 INJECTION INTRAMUSCULAR; INTRAVENOUS at 21:05

## 2020-11-18 RX ADMIN — LISINOPRIL AND HYDROCHLOROTHIAZIDE 1 TABLET: 12.5; 1 TABLET ORAL at 17:09

## 2020-11-18 RX ADMIN — HEPARIN SODIUM 5000 UNITS: 5000 INJECTION INTRAVENOUS; SUBCUTANEOUS at 01:40

## 2020-11-18 RX ADMIN — ATENOLOL 100 MG: 50 TABLET ORAL at 21:05

## 2020-11-18 NOTE — PROGRESS NOTES
Care Management Interventions  PCP Verified by CM: Yes  Discharge Durable Medical Equipment: No  Physical Therapy Consult: Yes  Occupational Therapy Consult: No  Current Support Network: Own Home  Discharge Location  Discharge Placement: Home    Chart screened by  for discharge planning. No needs identified at this time. Per PT notes, no further therapy needed. Please consult  if any new issues arise.

## 2020-11-18 NOTE — PROGRESS NOTES
Simons pulled at 0930 this morning. Bladder scanned at 1430 as patient still hasn't urinated. 264ml in bladder. Will give patient another hour to urinate and reassess. 1545:Patient still has not voided. [facility] from Urology called and said to insert simons and leave in and schedule her a follow up appointment for Monday.

## 2020-11-18 NOTE — PROGRESS NOTES
Problem: Falls - Risk of  Goal: *Absence of Falls  Description: Document Albino Messing Fall Risk and appropriate interventions in the flowsheet.   Outcome: Progressing Towards Goal  Note: Fall Risk Interventions:  Mobility Interventions: Communicate number of staff needed for ambulation/transfer         Medication Interventions: Teach patient to arise slowly         History of Falls Interventions: Door open when patient unattended

## 2020-11-18 NOTE — PROGRESS NOTES
Problem: Falls - Risk of  Goal: *Absence of Falls  Description: Document Richard Merlin Fall Risk and appropriate interventions in the flowsheet.   Outcome: Progressing Towards Goal  Note: Fall Risk Interventions:  Mobility Interventions: Communicate number of staff needed for ambulation/transfer         Medication Interventions: Teach patient to arise slowly         History of Falls Interventions: Door open when patient unattended

## 2020-11-18 NOTE — PROGRESS NOTES
END OF SHIFT NOTE:    Intake/Output  11/18 0701 - 11/18 1900  In: -   Out: 450 [Urine:450]   Voiding: YES  Catheter: YES  Drain:              Stool:  0 occurrences. Emesis:  0 occurrences. VITAL SIGNS  Patient Vitals for the past 12 hrs:   Temp Pulse Resp BP SpO2   11/18/20 1535 98.6 °F (37 °C) 79 16 138/67 100 %   11/18/20 1036 98.4 °F (36.9 °C) 80 18 120/78 100 %   11/18/20 0733 98 °F (36.7 °C) 88 17 (!) 145/69 100 %       Pain Assessment  Pain 1  Pain Scale 1: Numeric (0 - 10) (11/18/20 1430)  Pain Intensity 1: 0 (11/18/20 1430)  Patient Stated Pain Goal: 0 (11/18/20 1430)  Pain Reassessment 1: Patient resting w/respiratory rate greater than 10 (11/17/20 1640)  Pain Onset 1: upon waking (11/17/20 1208)  Pain Location 1: Incisional;Abdomen (11/17/20 1223)  Pain Orientation 1: Anterior; Upper; Lower (11/17/20 1223)  Pain Description 1: Constant (11/17/20 1208)  Pain Intervention(s) 1: Medication (see MAR); Emotional support (11/17/20 1208)    Ambulating  Yes    Additional Information: Patient had simons pulled at 0930. Has not voided so simons put back in. Will go home with it. VSS. No needs voiced. Shift report given to oncoming nurse at the bedside.     Dock Race

## 2020-11-18 NOTE — PROGRESS NOTES
General Surgery Progress Note    11/18/2020    Admit Date: 11/17/2020    Subjective:     Surgery POD #1  No major problems overnight. Patient reports incisional pain and general abdominal soreness. No nausea or vomiting. Patient has ambulated, used IS and tolerated CLD without complications. She will attempt protein with breakfast this morning. Pacheco Catheter placed last night due to inability to void. Will remove this morning and monitor. VSS, AF, UOP 550ml/24h, Labs stable, creat 1.45 up from 1.30 preoperatively. Objective:     Visit Vitals  BP (!) 145/69 (BP 1 Location: Right arm, BP Patient Position: At rest)   Pulse 88   Temp 98 °F (36.7 °C)   Resp 17   Wt 239 lb 6.4 oz (108.6 kg)   SpO2 100%   BMI 41.09 kg/m²         Intake/Output Summary (Last 24 hours) at 11/18/2020 0753  Last data filed at 11/18/2020 0132  Gross per 24 hour   Intake 764 ml   Output 570 ml   Net 194 ml        EXAM:  ABD soft, mild incisional tenderness, active BS'S. Wounds are intact without signs of bleeding or infection.  Steri strips intact       Data Review    Recent Results (from the past 24 hour(s))   GLUCOSE, POC    Collection Time: 11/17/20  8:41 AM   Result Value Ref Range    Glucose (POC) 115 (H) 65 - 100 mg/dL   TYPE & SCREEN    Collection Time: 11/17/20  8:44 AM   Result Value Ref Range    Crossmatch Expiration 11/20/2020,2359     ABO/Rh(D) Jules Herrmann POSITIVE     Antibody screen NEG    GLUCOSE, POC    Collection Time: 11/17/20  8:13 PM   Result Value Ref Range    Glucose (POC) 129 (H) 65 - 257 mg/dL   METABOLIC PANEL, BASIC    Collection Time: 11/18/20  5:01 AM   Result Value Ref Range    Sodium 138 136 - 145 mmol/L    Potassium 4.1 3.5 - 5.1 mmol/L    Chloride 105 98 - 107 mmol/L    CO2 27 21 - 32 mmol/L    Anion gap 6 (L) 7 - 16 mmol/L    Glucose 122 (H) 65 - 100 mg/dL    BUN 21 8 - 23 MG/DL    Creatinine 1.45 (H) 0.6 - 1.0 MG/DL    GFR est AA 47 (L) >60 ml/min/1.73m2    GFR est non-AA 39 (L) >60 ml/min/1.73m2    Calcium 9.0 8.3 - 10.4 MG/DL   CBC WITH AUTOMATED DIFF    Collection Time: 11/18/20  5:01 AM   Result Value Ref Range    WBC 6.6 4.3 - 11.1 K/uL    RBC 4.29 4.05 - 5.2 M/uL    HGB 11.8 11.7 - 15.4 g/dL    HCT 37.0 35.8 - 46.3 %    MCV 86.2 79.6 - 97.8 FL    MCH 27.5 26.1 - 32.9 PG    MCHC 31.9 31.4 - 35.0 g/dL    RDW 13.7 11.9 - 14.6 %    PLATELET 216 872 - 050 K/uL    MPV 11.7 9.4 - 12.3 FL    ABSOLUTE NRBC 0.00 0.0 - 0.2 K/uL    DF AUTOMATED      NEUTROPHILS 84 (H) 43 - 78 %    LYMPHOCYTES 13 13 - 44 %    MONOCYTES 3 (L) 4.0 - 12.0 %    EOSINOPHILS 0 (L) 0.5 - 7.8 %    BASOPHILS 0 0.0 - 2.0 %    IMMATURE GRANULOCYTES 0 0.0 - 5.0 %    ABS. NEUTROPHILS 5.6 1.7 - 8.2 K/UL    ABS. LYMPHOCYTES 0.9 0.5 - 4.6 K/UL    ABS. MONOCYTES 0.2 0.1 - 1.3 K/UL    ABS. EOSINOPHILS 0.0 0.0 - 0.8 K/UL    ABS. BASOPHILS 0.0 0.0 - 0.2 K/UL    ABS. IMM. GRANS. 0.0 0.0 - 0.5 K/UL        Hospital Problems  Date Reviewed: 11/10/2020          Codes Class Noted POA    * (Principal) [facility] ([facility] ICD-10-CM: E66.01  ICD-9-CM: 278.01  11/17/2020 Yes            1. Pain control. 2. IVF'S. 3. Anti-emetics as necessary  4. OOB/IS/Heparin   5. D/C Pacheco  6. Discharge Instructions reviewed with patient  7. Start Constipation regimen upon discharge  8.  Will evaluate later this afternoon for discharge to home with office follow up           Shantel Cunningham NP

## 2020-11-18 NOTE — PROGRESS NOTES
Problem: Mobility Impaired (Adult and Pediatric)  Goal: *Acute Goals and Plan of Care (Insert Text)  Outcome: Progressing Towards Goal  Note:   LTG:  (1.)Ms. Alix Russo will move from supine to sit and sit to supine  in bed with SUPERVISION within 7 treatment day(s). (2.)Ms. Alix Russo will transfer from bed to chair and chair to bed with SUPERVISION using the least restrictive device within 7 treatment day(s). (3.)Ms. Renner will ambulate with SUPERVISION for 650 feet with the least restrictive device within 7 treatment day(s). (4)Ms. Renner will perform HEP with cues to increase safety on his feet in 7 days. (5)Ms. Alix Russo will go up 2 steps with rail CGA in 7 days. ________________________________________________________________________________________________       PHYSICAL THERAPY: Initial Assessment and AM 11/18/2020  INPATIENT: PT Visit Days : 1  Payor: Landon Garsia / Plan: [address] [place] [address] HMO / Product Type: Managed Care Medicare /       NAME/AGE/GENDER: Cesario Elam is a 63 y.o. female   PRIMARY DIAGNOSIS: Morbid obesity ([facility] [E66.01]  Morbid obesity ([facility] [E66.01] Morbid obesity ([address] Morbid obesity ([facility]  Procedure(s) (LRB):  ERAS / GASTRECTOMY SLEEVE LAPAROSCOPIC (N/A)  1 Day Post-Op  ICD-10: Treatment Diagnosis:    Generalized Muscle Weakness (M62.81)  Other abnormalities of gait and mobility (R26.89)   Precaution/Allergies:  Codeine      ASSESSMENT:     Ms. Alix Russo presents with decreased functional mobility and gait s/p Procedure(s) (LRB):  ERAS / GASTRECTOMY SLEEVE LAPAROSCOPIC (N/A)  She lives with her  and is normally independent, uses a cane at times with gait. She ambulated the entire floor this am using IV pole for support and did well. She did HEP exercises on the side of bed. She was given HEP sheet. She had no questions or concerns. She still has a catheter and will have to void on her own to be able to go home today.     This section established at most recent assessment   PROBLEM LIST (Impairments causing functional limitations):  Decreased Strength  Decreased ADL/Functional Activities  Decreased Transfer Abilities  Decreased Ambulation Ability/Technique  Decreased Balance  Increased Pain  Decreased Activity Tolerance  Decreased Flexibility/Joint Mobility  Decreased [place] with Home Exercise Program   INTERVENTIONS PLANNED: (Benefits and precautions of physical therapy have been discussed with the patient.)  Balance Exercise  Bed Mobility  Family Education  Gait Training  Home Exercise Program (HEP)  Range of Motion (ROM)  Therapeutic Activites  Therapeutic Exercise/Strengthening  Transfer Training     TREATMENT PLAN: Frequency/Duration: daily for duration of hospital stay  Rehabilitation Potential For Stated Goals: Good     REHAB RECOMMENDATIONS (at time of discharge pending progress):    Placement:  home  Equipment:   None at this time              HISTORY:   History of Present Injury/Illness (Reason for Referral):  S/p Procedure(s) (LRB):  ERAS / GASTRECTOMY SLEEVE LAPAROSCOPIC (N/A)  Past Medical History/Comorbidities:   Ms. Ines Wakefield  has a past medical history of Congestive heart failure ([facility] Diabetic polyneuropathy associated with type 2 diabetes mellitus ([facility] (05/14/2019), Diverticulitis, Dyslipidemia, Hypertension, Neuropathy (5/14/2019), Numbness of right foot (5/14/2019), OA (osteoarthritis), Peripheral vascular disease ([facility] (5/14/2019), Shingles (2019), Sleep apnea, Stroke ([facility] Type 2 diabetes with nephropathy ([facility] (08/06/2020), and Vertigo. Ms. Ines Wakefield  has a past surgical history that includes hx colonoscopy; hx hysterectomy (1989); hx cyst incision and drainage (1975); colonoscopy (N/A, 2/14/2018); and hx heart catheterization.   Social History/Living Environment:   Home Environment: Private residence  One/Two Story Residence: One story  Living Alone: No  Support Systems: Spouse/Significant Other/Partner  Patient Expects to be Discharged to[de-identified] Private residence  Current DME Used/Available at Home: None  Prior Level of Function/Work/Activity:  Indepednent, cane at times     Number of Personal Factors/Comorbidities that affect the Plan of Care: 1-2: MODERATE COMPLEXITY   EXAMINATION:   Most Recent Physical Functioning:   Gross Assessment:  AROM: Generally decreased, functional  Strength: Generally decreased, functional               Posture:     Balance:  Sitting: Intact  Standing: With support Bed Mobility:  Supine to Sit: Stand-by assistance; Additional time  Wheelchair Mobility:     Transfers:  Sit to Stand: Stand-by assistance  Stand to Sit: Stand-by assistance  Bed to Chair: Stand-by assistance  Gait:     Speed/Marita: Delayed  Gait Abnormalities: Decreased step clearance  Distance (ft): 650 Feet (ft)  Assistive Device: Other (comment)(IV pole in left UE)  Ambulation - Level of Assistance: Stand-by assistance  Interventions: Safety awareness training;Verbal cues      Body Structures Involved:  Bones  Joints  Muscles  Ligaments Body Functions Affected: Movement Related Activities and Participation Affected: Mobility   Number of elements that affect the Plan of Care: 3: MODERATE COMPLEXITY   CLINICAL PRESENTATION:   Presentation: Stable and uncomplicated: LOW COMPLEXITY   CLINICAL DECISION MAKIN[address] [place] [address]-PAC 6 Clicks   Basic Mobility Inpatient Short Form  How much difficulty does the patient currently have. .. Unable A Lot A Little None   1. Turning over in bed (including adjusting bedclothes, sheets and blankets)? [] 1   [] 2   [x] 3   [] 4   2. Sitting down on and standing up from a chair with arms ( e.g., wheelchair, bedside commode, etc.)   [] 1   [] 2   [x] 3   [] 4   3. Moving from lying on back to sitting on the side of the bed? [] 1   [] 2   [x] 3   [] 4   How much help from another person does the patient currently need. .. Total A Lot A Little None   4. Moving to and from a bed to a chair (including a wheelchair)?    [] 1   [] 2   [] 3   [x] 4 5.  Need to walk in hospital room? [] 1   [] 2   [] 3   [x] 4   6. Climbing 3-5 steps with a railing? [] 1   [] 2   [x] 3   [] 4   © 2007, Trustees of [address] [place] [address] [address] [address], under license to [employer]. All rights reserved      Score:  Initial: 20 Most Recent: X (Date: -- )    Interpretation of Tool:  Represents activities that are increasingly more difficult (i.e. Bed mobility, Transfers, Gait). Medical Necessity:     Patient is expected to demonstrate progress in   strength, range of motion, and balance   to   decrease assistance required with exercises and functional mobility  . Reason for Services/Other Comments:  Patient   continues to require present interventions due to patient's inability to perform exercises and functional mobility independently  . Use of outcome tool(s) and clinical judgement create a POC that gives a: Clear prediction of patient's progress: LOW COMPLEXITY            TREATMENT:   (In addition to Assessment/Re-Assessment sessions the following treatments were rendered)   Pre-treatment Symptoms/Complaints:  abdominal pain  Pain: Initial:      Post Session:  did not rate   assessment  Therapeutic Exercise: (10 Minutes):  Exercises per grid below to improve mobility and strength. Required minimal visual, verbal, and manual cues to promote proper body alignment, promote proper body posture, and promote proper body mechanics. Progressed range and repetitions as indicated.      Date:  11/18 Date:   Date:     Activity/Exercise Parameters Parameters Parameters   Ankle pumps 10     Long arc quad 10     Hip flexion seated 10     Elbow flexion 10     UE overhead press 10                     Braces/Orthotics/Lines/Etc:   IV  simons catheter  O2 Device: Room air  Treatment/Session Assessment:    Response to Treatment:  did well  Interdisciplinary Collaboration:   Registered Nurse  After treatment position/precautions:   Up in chair  Bed/Chair-wheels locked  Bed in low position  Call light within reach   Compliance with Program/Exercises: Will assess as treatment progresses  Recommendations/Intent for next treatment session: \"Next visit will focus on advancements to more challenging activities and reduction in assistance provided\".   Total Treatment Duration:  PT Patient Time In/Time Out  Time In: 0820  Time Out: 1901 Elisha Friend PT

## 2020-11-18 NOTE — PROGRESS NOTES
END OF SHIFT:     Pt very drowsy at the beginning of shift. Pt ambulating in room, tolerating well. Pt did not void at the beginning of the shift, bladder scan performed. Pacheco catheter placed, with 450ml output. No PRN medications given. No pain or nausea present. Pt resting in bed with no needs voiced at this time.

## 2020-11-18 NOTE — PROGRESS NOTES
Surgery Addendum  The patient reports having a headache. Decreased IVF'S to 50 per hour. Restarted her Lasix dose. Stopped Lidocaine. Tylenol given by nurse. Pacheco placed overnight, but to be removed. Tolerating liquids thus far. Headache likely due to fluid overload.     Marjan King MD.

## 2020-11-18 NOTE — PROGRESS NOTES
Pt still did not void. Bladder scan @0030 showing 494ml. Pt voided 100ml after scan; post residual scan @0115 showed 273ml.  Urinary catheter inserted with 450ml output     Pt resting with no needs voiced at this time

## 2020-11-19 VITALS
SYSTOLIC BLOOD PRESSURE: 127 MMHG | HEART RATE: 75 BPM | RESPIRATION RATE: 18 BRPM | OXYGEN SATURATION: 100 % | BODY MASS INDEX: 41.09 KG/M2 | TEMPERATURE: 97.4 F | WEIGHT: 239.4 LBS | DIASTOLIC BLOOD PRESSURE: 53 MMHG

## 2020-11-19 LAB
ANION GAP SERPL CALC-SCNC: 8 MMOL/L (ref 7–16)
BUN SERPL-MCNC: 23 MG/DL (ref 8–23)
CALCIUM SERPL-MCNC: 8.7 MG/DL (ref 8.3–10.4)
CHLORIDE SERPL-SCNC: 108 MMOL/L (ref 98–107)
CO2 SERPL-SCNC: 25 MMOL/L (ref 21–32)
CREAT SERPL-MCNC: 1.31 MG/DL (ref 0.6–1)
ERYTHROCYTE [DISTWIDTH] IN BLOOD BY AUTOMATED COUNT: 14.1 % (ref 11.9–14.6)
GLUCOSE BLD STRIP.AUTO-MCNC: 80 MG/DL (ref 65–100)
GLUCOSE BLD STRIP.AUTO-MCNC: 83 MG/DL (ref 65–100)
GLUCOSE SERPL-MCNC: 87 MG/DL (ref 65–100)
HCT VFR BLD AUTO: 33.5 % (ref 35.8–46.3)
HGB BLD-MCNC: 10.6 G/DL (ref 11.7–15.4)
MCH RBC QN AUTO: 27.2 PG (ref 26.1–32.9)
MCHC RBC AUTO-ENTMCNC: 31.6 G/DL (ref 31.4–35)
MCV RBC AUTO: 86.1 FL (ref 79.6–97.8)
NRBC # BLD: 0 K/UL (ref 0–0.2)
PLATELET # BLD AUTO: 172 K/UL (ref 150–450)
PMV BLD AUTO: 11.3 FL (ref 9.4–12.3)
POTASSIUM SERPL-SCNC: 3.3 MMOL/L (ref 3.5–5.1)
RBC # BLD AUTO: 3.89 M/UL (ref 4.05–5.2)
SODIUM SERPL-SCNC: 141 MMOL/L (ref 136–145)
WBC # BLD AUTO: 7.6 K/UL (ref 4.3–11.1)

## 2020-11-19 PROCEDURE — 97530 THERAPEUTIC ACTIVITIES: CPT

## 2020-11-19 PROCEDURE — C9113 INJ PANTOPRAZOLE SODIUM, VIA: HCPCS | Performed by: SURGERY

## 2020-11-19 PROCEDURE — 36415 COLL VENOUS BLD VENIPUNCTURE: CPT

## 2020-11-19 PROCEDURE — 97110 THERAPEUTIC EXERCISES: CPT

## 2020-11-19 PROCEDURE — 74011250636 HC RX REV CODE- 250/636: Performed by: SURGERY

## 2020-11-19 PROCEDURE — 85027 COMPLETE CBC AUTOMATED: CPT

## 2020-11-19 PROCEDURE — 74011250637 HC RX REV CODE- 250/637: Performed by: SURGERY

## 2020-11-19 PROCEDURE — 74011000250 HC RX REV CODE- 250: Performed by: SURGERY

## 2020-11-19 PROCEDURE — 74011250637 HC RX REV CODE- 250/637: Performed by: NURSE PRACTITIONER

## 2020-11-19 PROCEDURE — 80048 BASIC METABOLIC PNL TOTAL CA: CPT

## 2020-11-19 PROCEDURE — 82962 GLUCOSE BLOOD TEST: CPT

## 2020-11-19 PROCEDURE — 74011250636 HC RX REV CODE- 250/636: Performed by: NURSE PRACTITIONER

## 2020-11-19 RX ADMIN — GABAPENTIN 200 MG: 100 CAPSULE ORAL at 08:23

## 2020-11-19 RX ADMIN — SODIUM CHLORIDE 40 MG: 9 INJECTION, SOLUTION INTRAMUSCULAR; INTRAVENOUS; SUBCUTANEOUS at 08:24

## 2020-11-19 RX ADMIN — ONDANSETRON 4 MG: 2 INJECTION INTRAMUSCULAR; INTRAVENOUS at 05:14

## 2020-11-19 RX ADMIN — ONDANSETRON 4 MG: 2 INJECTION INTRAMUSCULAR; INTRAVENOUS at 02:02

## 2020-11-19 RX ADMIN — LISINOPRIL AND HYDROCHLOROTHIAZIDE 1 TABLET: 12.5; 1 TABLET ORAL at 08:24

## 2020-11-19 RX ADMIN — ONDANSETRON 4 MG: 2 INJECTION INTRAMUSCULAR; INTRAVENOUS at 08:24

## 2020-11-19 RX ADMIN — HEPARIN SODIUM 5000 UNITS: 5000 INJECTION INTRAVENOUS; SUBCUTANEOUS at 08:24

## 2020-11-19 RX ADMIN — SUCRALFATE 1 G: 1 TABLET ORAL at 08:23

## 2020-11-19 RX ADMIN — ACETAMINOPHEN 1000 MG: 500 TABLET, FILM COATED ORAL at 05:14

## 2020-11-19 RX ADMIN — ATENOLOL 100 MG: 50 TABLET ORAL at 08:24

## 2020-11-19 NOTE — PROGRESS NOTES
Problem: Falls - Risk of  Goal: *Absence of Falls  Description: Document Carlos Lewis Fall Risk and appropriate interventions in the flowsheet.   Outcome: Progressing Towards Goal  Note: Fall Risk Interventions:  Mobility Interventions: Bed/chair exit alarm         Medication Interventions: Bed/chair exit alarm         History of Falls Interventions: Bed/chair exit alarm

## 2020-11-19 NOTE — DISCHARGE INSTRUCTIONS
1. Liquid diet with two protein shakes per day until seen for first post-op visit. Try to get at least 60 grams of protein per day. 2. Showering is allowed, but no tub baths or swimming. 3. Drainage is common from the wounds. Change the dressings as needed. Call our office if the wounds become reddened, tender, feel warm to the touch or pus starts to drain from the wound. 4. Take prescribed pain medication as directed, usually Percocet, Dilaudid, Norco or Ultram. Take over the counter medication for minor pain. 5. Ice may be applied intermittently to the surgical site or sites. 6. Call or office, [phone], if problems arise. 7. Follow up in the office at the assigned time. 8. Resume all medications as taken per surgery, unless specifically instructed not to take certain ones. 9. No lifting more than 25 pounds until told otherwise. 10. Driving is allowed 3 days after surgery as long as you feel comfortable enough to drive and have not taken any prescription pain medication prior to driving. 11. Leave Steri-strips in place until seen in the office. If the strips start to curl up, fall off or begin to have an odor, then gently remove the strips. 12. Start Fiber supplement daily    13. Start Constipation regimen: 100mg Colace twice daily, 17g Miralax twice daily. Continue until regular bowel pattern is established. If diarrhea occurs please discontinue regimen.

## 2020-11-19 NOTE — PROGRESS NOTES
Problem: Falls - Risk of  Goal: *Absence of Falls  Description: Document Fer Carrillo Fall Risk and appropriate interventions in the flowsheet.   Outcome: Progressing Towards Goal  Note: Fall Risk Interventions:  Mobility Interventions: Bed/chair exit alarm         Medication Interventions: Bed/chair exit alarm         History of Falls Interventions: Bed/chair exit alarm

## 2020-11-19 NOTE — PROGRESS NOTES
Care Management Interventions  PCP Verified by CM: Yes  Discharge Durable Medical Equipment: No  Physical Therapy Consult: Yes  Occupational Therapy Consult: No  Current Support Network: Own Home  Confirm Follow Up Transport: Family  Discharge Location  Discharge Placement: Home    Patient medically ready for discharge home with family. No CM needs at this time.

## 2020-11-19 NOTE — PROGRESS NOTES
General Surgery Progress Note    11/19/2020    Admit Date: 11/17/2020    Subjective:     Surgery POD #2  The patient was unable to void after the Pacheco catheter was removed, so it was replaced. Urology was consulted. Urology said to send her home with the catheter and they would see her next week. She has tolerated liquids, ambulated and passed flatus. Objective:     Visit Vitals  /83 (BP 1 Location: Right arm, BP Patient Position: At rest)   Pulse (!) 105   Temp 98.2 °F (36.8 °C)   Resp 18   Wt 239 lb 6.4 oz (108.6 kg)   SpO2 96%   BMI 41.09 kg/m²         Intake/Output Summary (Last 24 hours) at 11/19/2020 0823  Last data filed at 11/19/2020 0230  Gross per 24 hour   Intake    Output 2650 ml   Net -2650 ml        EXAM:  ABD soft, mild incisional tenderness, active BS'S. Wounds intact without signs of infection.         Data Review    Recent Results (from the past 24 hour(s))   GLUCOSE, POC    Collection Time: 11/18/20  8:36 AM   Result Value Ref Range    Glucose (POC) 130 (H) 65 - 100 mg/dL   GLUCOSE, POC    Collection Time: 11/18/20 10:44 AM   Result Value Ref Range    Glucose (POC) 125 (H) 65 - 100 mg/dL   GLUCOSE, POC    Collection Time: 11/18/20  4:59 PM   Result Value Ref Range    Glucose (POC) 105 (H) 65 - 100 mg/dL   GLUCOSE, POC    Collection Time: 11/18/20  8:04 PM   Result Value Ref Range    Glucose (POC) 94 65 - 100 mg/dL   CBC W/O DIFF    Collection Time: 11/19/20  4:20 AM   Result Value Ref Range    WBC 7.6 4.3 - 11.1 K/uL    RBC 3.89 (L) 4.05 - 5.2 M/uL    HGB 10.6 (L) 11.7 - 15.4 g/dL    HCT 33.5 (L) 35.8 - 46.3 %    MCV 86.1 79.6 - 97.8 FL    MCH 27.2 26.1 - 32.9 PG    MCHC 31.6 31.4 - 35.0 g/dL    RDW 14.1 11.9 - 14.6 %    PLATELET 441 074 - 845 K/uL    MPV 11.3 9.4 - 12.3 FL    ABSOLUTE NRBC 0.00 0.0 - 0.2 K/uL   METABOLIC PANEL, BASIC    Collection Time: 11/19/20  4:20 AM   Result Value Ref Range    Sodium 141 136 - 145 mmol/L    Potassium 3.3 (L) 3.5 - 5.1 mmol/L    Chloride 108 (H) 98 - 107 mmol/L    CO2 25 21 - 32 mmol/L    Anion gap 8 7 - 16 mmol/L    Glucose 87 65 - 100 mg/dL    BUN 23 8 - 23 MG/DL    Creatinine 1.31 (H) 0.6 - 1.0 MG/DL    GFR est AA 53 (L) >60 ml/min/1.73m2    GFR est non-AA 44 (L) >60 ml/min/1.73m2    Calcium 8.7 8.3 - 10.4 MG/DL   GLUCOSE, POC    Collection Time: 11/19/20  7:41 AM   Result Value Ref Range    Glucose (POC) 83 65 - 100 mg/dL        Hospital Problems  Date Reviewed: 11/10/2020          Codes Class Noted POA    * (Principal) Morbid obesity ([facility] ICD-10-CM: E66.01  ICD-9-CM: 278.01  11/17/2020 Yes          1. Discharge to home  2. F/u with me on 12/1/20  3.  F/u with Urology next week  Anjel Landry MD.

## 2020-11-19 NOTE — PROGRESS NOTES
END OF SHIFT:    Pt rested well during the night. Simons draining, patent. No PRN medications given. No acute events over night. Pt to d/c today with simons, follow up with urology Monday.

## 2020-11-19 NOTE — PROGRESS NOTES
Problem: Mobility Impaired (Adult and Pediatric)  Goal: *Acute Goals and Plan of Care (Insert Text)  Outcome: Progressing Towards Goal  Note:   LTG:  (1.)Ms. Anthony Fraire will move from supine to sit and sit to supine  in bed with SUPERVISION within 7 treatment day(s). (2.)Ms. Anthony Fraire will transfer from bed to chair and chair to bed with SUPERVISION using the least restrictive device within 7 treatment day(s). (3.)Ms. Renner will ambulate with SUPERVISION for 650 feet with the least restrictive device within 7 treatment day(s). (4)Ms. Renner will perform HEP with cues to increase safety on his feet in 7 days. (5)Ms. Anthony Fraire will go up 2 steps with rail CGA in 7 days. Goals met  ________________________________________________________________________________________________       PHYSICAL THERAPY: Daily Note and AM 11/19/2020  INPATIENT: PT Visit Days : 1  Payor: Mora Barreto / Plan: [address] [place] [address] HMO / Product Type: [employer] /       NAME/AGE/GENDER: Inocencia Guardado is a 63 y.o. female   PRIMARY DIAGNOSIS: Morbid obesity ([facility] [E66.01]  Morbid obesity ([facility] [E66.01] Morbid obesity ([address] Morbid obesity ([address]  Procedure(s) (LRB):  ERAS / GASTRECTOMY SLEEVE LAPAROSCOPIC (N/A)  2 Days Post-Op  ICD-10: Treatment Diagnosis:    · Generalized Muscle Weakness (M62.81)  · Other abnormalities of gait and mobility (R26.89)   Precaution/Allergies:  Codeine      ASSESSMENT:     Ms. Anthony Fraire presents with decreased functional mobility and gait s/p Procedure(s) (LRB):  ERAS / GASTRECTOMY SLEEVE LAPAROSCOPIC (N/A)  She lives with her  and is normally independent, uses a cane at times with gait. This am, pt. Doing well and eager to go home. She reports she is taking her catheter home and following up with urology but otherwise doing very well. She reports some soreness in her abdomen. She ambulated the entire floor and did some steps without any problems. She did her exercises in the chair and has a HEP sheet. She had no questions or concerns about going home today. This section established at most recent assessment   PROBLEM LIST (Impairments causing functional limitations):  1. Decreased Strength  2. Decreased ADL/Functional Activities  3. Decreased Transfer Abilities  4. Decreased Ambulation Ability/Technique  5. Decreased Balance  6. Increased Pain  7. Decreased Activity Tolerance  8. Decreased Flexibility/Joint Mobility  9. Decreased Greer with Home Exercise Program   INTERVENTIONS PLANNED: (Benefits and precautions of physical therapy have been discussed with the patient.)  1. Balance Exercise  2. Bed Mobility  3. Family Education  4. Gait Training  5. Home Exercise Program (HEP)  6. Range of Motion (ROM)  7. Therapeutic Activites  8. Therapeutic Exercise/Strengthening  9. Transfer Training     TREATMENT PLAN: Frequency/Duration: daily for duration of hospital stay  Rehabilitation Potential For Stated Goals: Good     REHAB RECOMMENDATIONS (at time of discharge pending progress):    Placement:  home  Equipment:    None at this time              HISTORY:   History of Present Injury/Illness (Reason for Referral):  S/p Procedure(s) (LRB):  ERAS / GASTRECTOMY SLEEVE LAPAROSCOPIC (N/A)  Past Medical History/Comorbidities:   Ms. Simone Baron  has a past medical history of Congestive heart failure ([address] Diabetic polyneuropathy associated with type 2 diabetes mellitus ([address] (05/14/2019), Diverticulitis, Dyslipidemia, Hypertension, Neuropathy (5/14/2019), Numbness of right foot (5/14/2019), OA (osteoarthritis), Peripheral vascular disease ([address] (5/14/2019), Shingles (2019), Sleep apnea, Stroke ([address] Type 2 diabetes with nephropathy ([address] (08/06/2020), and Vertigo. Ms. Simone Baron  has a past surgical history that includes hx colonoscopy; hx hysterectomy (1989); hx cyst incision and drainage (1975); colonoscopy (N/A, 2/14/2018); and hx heart catheterization.   Social History/Living Environment:   Home Environment: Private residence  One/Two Story Residence: One story  Living Alone: No  Support Systems: Spouse/Significant Other/Partner  Patient Expects to be Discharged to[de-identified] Private residence  Current DME Used/Available at Home: None  Prior Level of Function/Work/Activity:  Indepednent, cane at times     Number of Personal Factors/Comorbidities that affect the Plan of Care: 1-2: MODERATE COMPLEXITY   EXAMINATION:   Most Recent Physical Functioning:   Gross Assessment:  AROM: Generally decreased, functional  Strength: Generally decreased, functional               Posture:     Balance:  Sitting: Intact  Standing: With support Bed Mobility:     Wheelchair Mobility:     Transfers:  Sit to Stand: Supervision;Stand-by assistance  Stand to Sit: Supervision;Stand-by assistance  Duration: 15 Minutes  Gait:     Speed/Marita: Delayed  Gait Abnormalities: Decreased step clearance  Distance (ft): 650 Feet (ft)  Assistive Device: (none)  Ambulation - Level of Assistance: Stand-by assistance;Supervision  Number of Stairs Trained: 4  Stairs - Level of Assistance: Stand-by assistance  Rail Use: Right   Interventions: Safety awareness training;Verbal cues      Body Structures Involved:  1. Bones  2. Joints  3. Muscles  4. Ligaments Body Functions Affected:  1. Movement Related Activities and Participation Affected:  1. Mobility   Number of elements that affect the Plan of Care: 3: MODERATE COMPLEXITY   CLINICAL PRESENTATION:   Presentation: Stable and uncomplicated: LOW COMPLEXITY   CLINICAL DECISION MAKIN [facility] [address] [id number] AM-PAC 6 Clicks   Basic Mobility Inpatient Short Form  How much difficulty does the patient currently have. .. Unable A Lot A Little None   1. Turning over in bed (including adjusting bedclothes, sheets and blankets)? [] 1   [] 2   [x] 3   [] 4   2. Sitting down on and standing up from a chair with arms ( e.g., wheelchair, bedside commode, etc.)   [] 1   [] 2   [x] 3   [] 4   3.   Moving from lying on back to sitting on the side of the bed? [] 1   [] 2   [x] 3   [] 4   How much help from another person does the patient currently need. .. Total A Lot A Little None   4. Moving to and from a bed to a chair (including a wheelchair)? [] 1   [] 2   [] 3   [x] 4   5. Need to walk in hospital room? [] 1   [] 2   [] 3   [x] 4   6. Climbing 3-5 steps with a railing? [] 1   [] 2   [x] 3   [] 4   © 2007, Trustees of [facility] [employer], under license to [employer]. All rights reserved      Score:  Initial: 20 Most Recent: X (Date: -- )    Interpretation of Tool:  Represents activities that are increasingly more difficult (i.e. Bed mobility, Transfers, Gait). Medical Necessity:     · Patient is expected to demonstrate progress in   · strength, range of motion, and balance  ·  to   · decrease assistance required with exercises and functional mobility  · . Reason for Services/Other Comments:  · Patient   · continues to require present interventions due to patient's inability to perform exercises and functional mobility independently  · . Use of outcome tool(s) and clinical judgement create a POC that gives a: Clear prediction of patient's progress: LOW COMPLEXITY            TREATMENT:   (In addition to Assessment/Re-Assessment sessions the following treatments were rendered)   Pre-treatment Symptoms/Complaints:  abdominal soreness  Pain: Initial:      Post Session:  did not rate   Therapeutic Activity: (  15 Minutes ):  Therapeutic activities including Chair transfers, Ambulation on level ground, Stairs and standing to improve mobility, strength and balance. Required minimal Safety awareness training;Verbal cues to promote static and dynamic balance in standing. Therapeutic Exercise: (10 Minutes):  Exercises per grid below to improve mobility and strength. Required minimal visual, verbal, and manual cues to promote proper body alignment, promote proper body posture, and promote proper body mechanics.   Progressed range and repetitions as indicated. Date:  11/18 Date:  11/19 Date:     Activity/Exercise Parameters Parameters Parameters   Ankle pumps 10 10    Long arc quad 10 10    Hip flexion seated 10 10    Elbow flexion 10 10    UE overhead press 10 10                    Braces/Orthotics/Lines/Etc:   · simons catheter  · O2 Device: Room air  Treatment/Session Assessment:    · Response to Treatment:  Did very well, no problems  · Interdisciplinary Collaboration:   o Registered Nurse  · After treatment position/precautions:   o Up in chair  o Bed/Chair-wheels locked  o Bed in low position  o Call light within reach   · Compliance with Program/Exercises: Compliant most of the time  · Recommendations/Intent for next treatment session: \"Next visit will focus on advancements to more challenging activities and reduction in assistance provided\".   Total Treatment Duration:  PT Patient Time In/Time Out  Time In: 1020  Time Out: 10 [facility], PT

## 2020-11-20 ENCOUNTER — PATIENT OUTREACH (OUTPATIENT)
Dept: CASE MANAGEMENT | Age: 65
End: 2020-11-20

## 2020-11-20 NOTE — PROGRESS NOTES
Patient was admitted to [facility] on 20 and discharged on 20 for s/p gastrectomy sleeve. Outreach made within 2 business days of discharge: Yes    Top Discharge Challenges to be reviewed by the provider   Additional needs identified to be addressed with provider no  none  Discussed COVID-19 related testing which was not done at this time. Test results were not done. Patient informed of results, if available? n/a   Method of communication with provider : none       Advance Care Planning:   Does patient have an Advance Directive:  health care decision makers updated    Inpatient Readmission Risk score: 6  Was this a readmission? No planned/elective surgery   Patient stated reason for the admission: n/a  Patients top risk factors for readmission: complication of surgery and/or comorbidities  Interventions to address risk factors: n/a    LPN Care Coordinator contacted the patient by telephone to perform post hospital discharge assessment. Verified name and  with patient as identifiers. Provided introduction to self, and explanation of the CTN role. CTN reviewed discharge instructions, medical action plan and red flags with patient who verbalized understanding. Patient given an opportunity to ask questions and does not have any further questions or concerns at this time. The patient agrees to contact the PCP office for questions related to their healthcare. Medication reconciliation declined by patient, who verbalizes understanding of administration of home medications. Referral to Pharm D needed: no     Home Health/Outpatient orders at discharge: [address]: n/a  Date of initial visit: 1235 [facility] ordered at discharge: [address] received: n/a    Covid Risk Education    Patient has following risk factors of: diabetes and preop negative.  Education provided regarding infection prevention, and signs and symptoms of COVID-19 and when to seek medical attention with patient who verbalized understanding. Discussed exposure protocols and quarantine From CDC: Are you at higher risk for severe illness?  and given an opportunity for questions and concerns. The patient agrees to contact the COVID-19 hotline [phone] or PCP office for questions related to COVID-19. For more information on steps you can take to protect yourself, see CDC's How to Protect Yourself     Patient/family/caregiver given information for [employer] and agrees to enroll no      Discussed follow-up appointments. If no appointment was previously scheduled, appointment scheduling offered: yes  [facility] follow up appointment(s):   Future Appointments   Date Time Provider Hailee Sibley   11/24/2020  9:30 AM MD ENID Emerson   11/25/2020  8:30 AM Kingsley Garcia NP [facility]     Non-BS follow up appointment(s): n/a  Plan for follow up call within 21 days based on severity of symptoms and risk factors. CC provided contact information for future needs. Goals Addressed                 This Visit's Progress     Attends follow-up appointments as directed.

## 2020-12-15 ENCOUNTER — HOSPITAL ENCOUNTER (OUTPATIENT)
Dept: GENERAL RADIOLOGY | Age: 65
Discharge: HOME | End: 2020-12-15
Attending: NURSE PRACTITIONER
Payer: COMMERCIAL

## 2020-12-15 DIAGNOSIS — Z98.84 STATUS POST LAPAROSCOPIC SLEEVE GASTRECTOMY: ICD-10-CM

## 2020-12-15 DIAGNOSIS — R11.2 NAUSEA AND VOMITING, INTRACTABILITY OF VOMITING NOT SPECIFIED, UNSPECIFIED VOMITING TYPE: ICD-10-CM

## 2020-12-15 PROCEDURE — 74011000255 HC RX REV CODE- 255: Performed by: NURSE PRACTITIONER

## 2020-12-15 PROCEDURE — 74240 X-RAY XM UPR GI TRC 1CNTRST: CPT

## 2020-12-15 RX ADMIN — BARIUM SULFATE 90 ML: 0.6 SUSPENSION ORAL at 08:42

## 2020-12-15 RX ADMIN — BARIUM SULFATE 135 ML: 980 POWDER, FOR SUSPENSION ORAL at 08:38

## 2021-02-23 ENCOUNTER — ANESTHESIA EVENT (OUTPATIENT)
Dept: ENDOSCOPY | Age: 66
End: 2021-02-23
Payer: COMMERCIAL

## 2021-02-23 RX ORDER — SODIUM CHLORIDE 0.9 % (FLUSH) 0.9 %
5-40 SYRINGE (ML) INJECTION EVERY 8 HOURS
Status: CANCELLED | OUTPATIENT
Start: 2021-02-23

## 2021-02-23 RX ORDER — SODIUM CHLORIDE 0.9 % (FLUSH) 0.9 %
5-40 SYRINGE (ML) INJECTION AS NEEDED
Status: CANCELLED | OUTPATIENT
Start: 2021-02-23

## 2021-02-23 RX ORDER — SODIUM CHLORIDE, SODIUM LACTATE, POTASSIUM CHLORIDE, CALCIUM CHLORIDE 600; 310; 30; 20 MG/100ML; MG/100ML; MG/100ML; MG/100ML
100 INJECTION, SOLUTION INTRAVENOUS CONTINUOUS
Status: CANCELLED | OUTPATIENT
Start: 2021-02-23

## 2021-02-24 ENCOUNTER — ANESTHESIA (OUTPATIENT)
Dept: ENDOSCOPY | Age: 66
End: 2021-02-24
Payer: COMMERCIAL

## 2021-02-24 ENCOUNTER — HOSPITAL ENCOUNTER (OUTPATIENT)
Age: 66
Setting detail: OUTPATIENT SURGERY
Discharge: HOME | End: 2021-02-24
Attending: INTERNAL MEDICINE | Admitting: INTERNAL MEDICINE
Payer: COMMERCIAL

## 2021-02-24 VITALS
DIASTOLIC BLOOD PRESSURE: 95 MMHG | WEIGHT: 211 LBS | HEIGHT: 62 IN | OXYGEN SATURATION: 95 % | HEART RATE: 59 BPM | BODY MASS INDEX: 38.83 KG/M2 | SYSTOLIC BLOOD PRESSURE: 171 MMHG | TEMPERATURE: 96.8 F | RESPIRATION RATE: 16 BRPM

## 2021-02-24 LAB — GLUCOSE BLD STRIP.AUTO-MCNC: 98 MG/DL (ref 65–100)

## 2021-02-24 PROCEDURE — 74011250636 HC RX REV CODE- 250/636: Performed by: STUDENT IN AN ORGANIZED HEALTH CARE EDUCATION/TRAINING PROGRAM

## 2021-02-24 PROCEDURE — 76060000032 HC ANESTHESIA 0.5 TO 1 HR: Performed by: INTERNAL MEDICINE

## 2021-02-24 PROCEDURE — 2709999900 HC NON-CHARGEABLE SUPPLY: Performed by: INTERNAL MEDICINE

## 2021-02-24 PROCEDURE — 77030008969: Performed by: INTERNAL MEDICINE

## 2021-02-24 PROCEDURE — 74011000250 HC RX REV CODE- 250: Performed by: NURSE ANESTHETIST, CERTIFIED REGISTERED

## 2021-02-24 PROCEDURE — 88312 SPECIAL STAINS GROUP 1: CPT

## 2021-02-24 PROCEDURE — 88305 TISSUE EXAM BY PATHOLOGIST: CPT

## 2021-02-24 PROCEDURE — 76040000025: Performed by: INTERNAL MEDICINE

## 2021-02-24 PROCEDURE — 82962 GLUCOSE BLOOD TEST: CPT

## 2021-02-24 PROCEDURE — 88342 IMHCHEM/IMCYTCHM 1ST ANTB: CPT

## 2021-02-24 PROCEDURE — 88341 IMHCHEM/IMCYTCHM EA ADD ANTB: CPT

## 2021-02-24 PROCEDURE — 74011250636 HC RX REV CODE- 250/636: Performed by: NURSE ANESTHETIST, CERTIFIED REGISTERED

## 2021-02-24 PROCEDURE — 77030037345 HC NET FB ENDO RETVR RESCUNT DISP BSC -B: Performed by: INTERNAL MEDICINE

## 2021-02-24 PROCEDURE — 77030018775 HC LIG MULTBND COOK -C: Performed by: INTERNAL MEDICINE

## 2021-02-24 RX ORDER — LIDOCAINE HYDROCHLORIDE 10 MG/ML
0.1 INJECTION INFILTRATION; PERINEURAL AS NEEDED
Status: DISCONTINUED | OUTPATIENT
Start: 2021-02-24 | End: 2021-02-24 | Stop reason: HOSPADM

## 2021-02-24 RX ORDER — SODIUM CHLORIDE 0.9 % (FLUSH) 0.9 %
5-40 SYRINGE (ML) INJECTION EVERY 8 HOURS
Status: DISCONTINUED | OUTPATIENT
Start: 2021-02-24 | End: 2021-02-24 | Stop reason: HOSPADM

## 2021-02-24 RX ORDER — PROPOFOL 10 MG/ML
INJECTION, EMULSION INTRAVENOUS AS NEEDED
Status: DISCONTINUED | OUTPATIENT
Start: 2021-02-24 | End: 2021-02-24 | Stop reason: HOSPADM

## 2021-02-24 RX ORDER — SODIUM CHLORIDE, SODIUM LACTATE, POTASSIUM CHLORIDE, CALCIUM CHLORIDE 600; 310; 30; 20 MG/100ML; MG/100ML; MG/100ML; MG/100ML
100 INJECTION, SOLUTION INTRAVENOUS CONTINUOUS
Status: DISCONTINUED | OUTPATIENT
Start: 2021-02-24 | End: 2021-02-24 | Stop reason: HOSPADM

## 2021-02-24 RX ORDER — PROPOFOL 10 MG/ML
INJECTION, EMULSION INTRAVENOUS
Status: DISCONTINUED | OUTPATIENT
Start: 2021-02-24 | End: 2021-02-24 | Stop reason: HOSPADM

## 2021-02-24 RX ORDER — LIDOCAINE HYDROCHLORIDE 20 MG/ML
INJECTION, SOLUTION EPIDURAL; INFILTRATION; INTRACAUDAL; PERINEURAL AS NEEDED
Status: DISCONTINUED | OUTPATIENT
Start: 2021-02-24 | End: 2021-02-24 | Stop reason: HOSPADM

## 2021-02-24 RX ORDER — SODIUM CHLORIDE 0.9 % (FLUSH) 0.9 %
5-40 SYRINGE (ML) INJECTION AS NEEDED
Status: DISCONTINUED | OUTPATIENT
Start: 2021-02-24 | End: 2021-02-24 | Stop reason: HOSPADM

## 2021-02-24 RX ADMIN — PROPOFOL 160 MCG/KG/MIN: 10 INJECTION, EMULSION INTRAVENOUS at 14:02

## 2021-02-24 RX ADMIN — PROPOFOL 50 MG: 10 INJECTION, EMULSION INTRAVENOUS at 14:02

## 2021-02-24 RX ADMIN — SODIUM CHLORIDE, SODIUM LACTATE, POTASSIUM CHLORIDE, AND CALCIUM CHLORIDE 100 ML/HR: 600; 310; 30; 20 INJECTION, SOLUTION INTRAVENOUS at 13:33

## 2021-02-24 RX ADMIN — LIDOCAINE HYDROCHLORIDE 100 MG: 20 INJECTION, SOLUTION EPIDURAL; INFILTRATION; INTRACAUDAL; PERINEURAL at 14:02

## 2021-02-24 RX ADMIN — PROPOFOL 20 MG: 10 INJECTION, EMULSION INTRAVENOUS at 14:07

## 2021-02-24 NOTE — H&P
History and Physical for Endoscopic Ultrasound             Date: 2021     History of Present Illness:  Patient presents to undergo endoscopic ultrasound for evaluation of a suspected subepithelial lesion in the stomach. Patient denies any diarrhea, constipation, tenesmus, GI bleeding, or weight loss.         Past Medical History:   Diagnosis Date    Congestive heart failure ([facility]      when pt had stroke    Diabetic polyneuropathy associated with type 2 diabetes mellitus ([facility] 2019    Diverticulitis     Dyslipidemia     Hypertension     managed with meds     Neuropathy 2019    Numbness of right foot 2019    OA (osteoarthritis)     Peripheral vascular disease ([facility] 2019    Shingles 2019    Sleep apnea     No c-pap     [facility]     - slight limp on LLE-  no use of asst device    Type 2 diabetes with nephropathy [facility] 2020    oral meds, average BS , last A1C 5.5 in 2020, Hypoglycemic episodes at 52    Vertigo      Past Surgical History:   Procedure Laterality Date    COLONOSCOPY N/A 2018    COLONOSCOPY/ 43 performed by Roy[facility]reilly [facility] ENDOSCOPY    HX COLONOSCOPY      HX CYST INCISION AND DRAINAGE      Left breast     HX HEART CATHETERIZATION      HX HYSTERECTOMY      TOTAL      Family History   Problem Relation Age of Onset    Sudden Death Sister 43        MI    Stroke Mother 78    Heart Disease Mother 77        \"silent heart attack\"    Hypertension Mother     Diabetes Father     Hypertension Brother     Hypertension Sister     Hypertension Brother      Social History     Tobacco Use    Smoking status: Former Smoker     Packs/day: 0.50     Years: 24.00     Pack years: 12.00     Quit date: 1999     Years since quittin.2    Smokeless tobacco: Never Used   Substance Use Topics    Alcohol use: No        Allergies   Allergen Reactions    Codeine Swelling     Severe Facial swelling     No current facility-administered medications for this encounter. Review of Systems:  A detailed 10 organ review of systems is obtained with pertinent positives as listed in the History of Present Illness. All others are negative. Objective:     Physical Exam:  There were no vitals taken for this visit. General:  Alert and oriented. Heart: Regular rate and rhythm  Lungs:  Clear to auscultation bilaterally  Abdomen: Soft, nontender, nondistended    Impression/Plan:     Proceed with EUS as planned. I have discussed with the patient the technique, benefits, alternatives, and risks of the procedure, including medication reaction, immediate or delayed bleeding, or perforation of the gastrointestinal tract.     Signed By: Domenica Deras MD     February 24, 2021

## 2021-02-24 NOTE — OP NOTES
Gastroenterology Procedure Note             Procedure:  Endoscopic ultrasound with Doppler, Esophagogastroduodenoscopy, Endoscopic mucosal resection     Date of Procedure:  2/24/2021    Patient:  Krysten Andrew     1955    Indication:  Gastric subepithelial lesion    Sedation:  MAC    Pre-Procedure Physical Exam:    Mental status:  alert and oriented  Airway:  normal oropharyngeal airway and neck mobility  CV:  regular rate and rhythm  Respiratory:  clear to auscultation    Procedure:  A History and Physical has been performed, and patient medication allergies have been reviewed. Risks of perforation, hemorrhage, adverse drug reaction, and aspiration were discussed. Informed consent was obtained for the procedure, including sedation. The patient was placed in the left lateral decubitus position. The heart rate, oxygen saturations, blood pressure, and response to care were monitored throughout the procedure. The linear echoendoscope was passed through the mouth and advanced under direct vision to the distal duodenum. As the scope was slowly withdrawn, detailed endoscopic images were obtained from the surrounding organs. The gastroscope was then passed to perform endoscopic mucosal resection. Findings:     ENDOSCOPIC FINDINGS:    OROPHARYNX:  Cords and pyriform recesses appear normal.   ESOPHAGUS:  Normal.  STOMACH:  A 1 cm nodule is seen in the antrum. After endosonographic exam, band-assisted endoscopic mucosal resection was performed using a MATIvision. Resection was complete. No bleeding was present following the procedure. DUODENUM:  The bulb and second portions are normal.     EUS FINDINGS:  STOMACH:  Multiple sweeps were made throughout the stomach visualizing the entire wall from the pylorus the gastroesophageal junction. At 65 cm from the incisors there is a 9 x 6 mm hypoechoic lesion arising from the submucosal layer of the gastric wall. The muscularis propria is not involved.  The remainder of the gastric wall is of normal thickness and normal wall architecture. ESOPHAGUS:  Multiple sweeps were made throughout the esophagus visualizing the entire wall from the gastroesophageal junction to the upper esophageal sphincter. The entire esophageal wall is of normal thickness and normal wall architecture. OTHER ORGANS:  Views of the left lobe of the liver demonstrate no solid mass lesions. The left adrenal gland appears normal. Due to the focused nature of the examination, detailed images of the pancreas and biliary tree were nor obtained. There are no pathologically enlarged upper abdominal lymph nodes. Specimen:  Yes    Estimated Blood Loss:  None    Implant:  None            Impression:    Gastric subepithelial lesion. This is most suspicious for a small carcinoid tumor. EMR was performed. Plan:  1. Follow up results of pathology. 2. Avoid NSAIDs for 1 week. 3. Return to office in 1-2 months.      Luda Billingsley MD

## 2021-02-24 NOTE — DISCHARGE INSTRUCTIONS
Gastrointestinal Esophagogastroduodenoscopy (EGD) - Upper Exam Discharge Instructions    1. Call Dr. Delores Hanson at [phone] for any problems or questions. 2. Contact the doctor's office for follow up appointment as directed. 3. Medication may cause drowsiness for several hours, therefore:  · Do not drive or operate machinery for remainder of the day. · No alcohol today. · Do not make any important or legal decisions for 24 hours. · Do not sign any legal documents for 24 hours. 5. Ordinarily, you may resume regular diet and activity after exam unless otherwise              specified by your physician. 6. For mild soreness in your throat you may use Cepacol throat lozenges or warm               salt-water gargles as needed. Any additional instructions: Plan:  1. Follow up results of pathology. 2. Avoid NSAIDs for 1 week. 3. Return to office in 1-2 months. Instructions given to Augustine Olivo and other family members.   Instructions given by:

## 2021-02-24 NOTE — ANESTHESIA PREPROCEDURE EVALUATION
Anesthetic History   No history of anesthetic complications            Review of Systems / Medical History  Patient summary reviewed, nursing notes reviewed and pertinent labs reviewed    Pulmonary              Pertinent negatives: No smoker (quit 22 years)     Neuro/Psych       CVA (had some left sided weakness, resolved)       Cardiovascular    Hypertension: well controlled          Past MI (2003, no intervention, no angina since.) and CAD    Exercise tolerance: >4 METS     GI/Hepatic/Renal     GERD: well controlled           Endo/Other    Diabetes: well controlled, type 2    Morbid obesity and arthritis     Other Findings              Physical Exam    Airway  Mallampati: II  TM Distance: 4 - 6 cm  Neck ROM: normal range of motion   Mouth opening: Diminished (comment)     Cardiovascular  Regular rate and rhythm,  S1 and S2 normal,  no murmur, click, rub, or gallop  Rhythm: regular  Rate: normal         Dental    Dentition: Edentulous     Pulmonary  Breath sounds clear to auscultation               Abdominal         Other Findings            Anesthetic Plan    ASA: 3  Anesthesia type: total IV anesthesia          Induction: Intravenous  Anesthetic plan and risks discussed with: Patient

## 2021-02-24 NOTE — PROGRESS NOTES
Attempted pre-op visit, as requested. Pt was  Not in the prep area, however, and unable to visit prior to procedure to offer prayer at bedside.     Yolanda George MDiv, [address] [place] [address]

## 2021-02-24 NOTE — ANESTHESIA POSTPROCEDURE EVALUATION
Procedure(s):  ENDOSCOPIC ULTRASOUND (EUS)  ESOPHAGOGASTRODUODENOSCOPY (EGD)  ENDOSCOPIC MUCOSAL RESECTION. total IV anesthesia    Anesthesia Post Evaluation      Multimodal analgesia: multimodal analgesia used between 6 hours prior to anesthesia start to PACU discharge  Patient location during evaluation: bedside  Patient participation: complete - patient participated  Level of consciousness: awake and alert  Pain management: adequate  Airway patency: patent  Anesthetic complications: no  Cardiovascular status: acceptable  Respiratory status: acceptable  Hydration status: acceptable  Post anesthesia nausea and vomiting:  controlled  Final Post Anesthesia Temperature Assessment:  Normothermia (36.0-37.5 degrees C)      INITIAL Post-op Vital signs:   Vitals Value Taken Time   /95 02/24/21 1450   Temp 36 °C (96.8 °F) 02/24/21 1433   Pulse 55 02/24/21 1451   Resp 16 02/24/21 1433   SpO2 100 % 02/24/21 1451   Vitals shown include unvalidated device data.

## 2021-06-28 NOTE — INTERVAL H&P NOTE
Update History & Physical 
 
The Patient's History and Physical of 11/11/20 was reviewed with the patient and I examined the patient. There was no change. The surgical site was confirmed by the patient and me. Plan:  The risk, benefits, expected outcome, and alternative to the recommended procedure have been discussed with the patient. Patient understands and wants to proceed with the procedure.  
 
Electronically signed by Jamey Montejo MD on 11/17/2020 at 7:20 AM 
 PAST SURGICAL HISTORY:  Acquired arteriovenous fistula left wrist  1/2015 - [facility], Left upper arm 4/2015 (approximate date)    S/P Nephrectomy (L) 2007

## 2021-08-03 PROBLEM — E66.01 MORBID OBESITY (HCC): Status: RESOLVED | Noted: 2020-11-17 | Resolved: 2021-08-03

## 2022-03-18 PROBLEM — M79.605 PAIN OF LEFT LOWER EXTREMITY: Status: ACTIVE | Noted: 2019-02-04

## 2022-03-18 PROBLEM — E11.21 TYPE 2 DIABETES WITH NEPHROPATHY (HCC): Status: ACTIVE | Noted: 2020-08-06

## 2022-03-18 PROBLEM — G62.9 NEUROPATHY: Status: ACTIVE | Noted: 2019-05-14

## 2022-03-18 PROBLEM — E55.9 VITAMIN D DEFICIENCY: Status: ACTIVE | Noted: 2020-08-06

## 2022-03-18 PROBLEM — I73.9 PERIPHERAL VASCULAR DISEASE (HCC): Status: ACTIVE | Noted: 2019-05-14

## 2022-03-18 PROBLEM — I16.1 HYPERTENSIVE EMERGENCY: Status: ACTIVE | Noted: 2020-05-15

## 2022-03-19 PROBLEM — F32.A MILD DEPRESSION: Status: ACTIVE | Noted: 2018-11-07

## 2022-03-19 PROBLEM — F34.1 DYSTHYMIA: Status: ACTIVE | Noted: 2017-10-31

## 2022-03-19 PROBLEM — E66.01 SEVERE OBESITY (BMI >= 40) (HCC): Status: ACTIVE | Noted: 2019-08-06

## 2022-03-19 PROBLEM — R20.0 NUMBNESS OF RIGHT FOOT: Status: ACTIVE | Noted: 2019-05-14

## 2022-03-20 PROBLEM — E11.9 TYPE 2 DIABETES MELLITUS (HCC): Status: ACTIVE | Noted: 2017-10-31

## 2022-11-03 NOTE — PROGRESS NOTES
Bariatric Surgery Follow Up Visit    Patient: Maddy Sahu MRN: [id number]     YOB: 1955  Age: 74 y.o. Sex: female              PCP: AUGUST Sanford NP   Date:  11/10/2022    Weight History Graph    Surgeon: [facility]   DOS: 11/17/2020   Procedure: Sleeve   Pre-op weight: 239   Ideal body weight: 145   Excess body weight: 94      Post-Surgical Weight Loss  Date: 11/10/22  Height: 5' 2\" (157.5 cm)  Weight: 168 lb (76.2 kg)  BMI: 30.72  Weight Change: -71 lbs  Total Weight Change: -71 lbs  % EBWL: 76%     2 years post-op visit after a sleeve gastrectomy was done on 11/17/2020. She has gained 5lbs since her last office visit. Evaluation of Pre Operative Co Morbid Conditions:    Diabetes-RS   Hypertension-RS  Hyperlipidemia-RS    Clinical Assessment and Physical Exam:    Doing well 2 years post op. She has been compliant with diet and exercise, and is happy with her progress. She reports occasional epigastric pain, nausea or vomiting with over eating. She notes occasional constipation. Protein:  60 gms/day  Fluids:    64 ounces/day  Exercise:  walking, jogging daily  Occasional abdominal pain, vomiting, or nausea. No fever or chills. Incisions healing well. Denies reflux. Denies dysphagia. Regular bowel movements. Tolerating Protein first diet without difficulty. Physical Exam:  BP (!) 159/80   Pulse 52   Ht 5' 2\" (1.575 m)   Wt 168 lb (76.2 kg)   BMI 30.73 kg/m²     General: Alert, oriented, cooperative black female in no acute distress. Eyes: Sclera are clear. Conjunctiva and lids within normal limits. No icterus. Ears and Nose: no gross deformities to visual inspection, gross hearing intact  Neck: Supple, trachea midline, no appreciable thyromegaly  Resp:  Breathing is  non-labored. Lungs clear to auscultation without wheezing or rhonchi   CV: RRR. No murmurs, rubs or gallops appreciated. Abd: soft, not distended, active BS'S. No tenderness to palpation.  Wounds are clean, dry and intact without signs of infection. Discussed the patient's BMI with her. The BMI follow up plan is as follows:  Plan:  Protein first diet  Exercise: keep up current routine. Add resistance exercises. Continue appropriate supplements  Follow up in 1 year or sooner          Odilia Alexandre.  Brianna Heath MD  11/10/2022

## 2022-11-10 ENCOUNTER — OFFICE VISIT (OUTPATIENT)
Dept: SURGERY | Age: 67
End: 2022-11-10
Payer: COMMERCIAL

## 2022-11-10 VITALS
HEART RATE: 52 BPM | WEIGHT: 168 LBS | BODY MASS INDEX: 30.91 KG/M2 | SYSTOLIC BLOOD PRESSURE: 159 MMHG | DIASTOLIC BLOOD PRESSURE: 80 MMHG | HEIGHT: 62 IN

## 2022-11-10 DIAGNOSIS — E66.3 OVERWEIGHT (BMI 25.0-29.9): Primary | ICD-10-CM

## 2022-11-10 DIAGNOSIS — Z71.3 DIETARY COUNSELING: ICD-10-CM

## 2022-11-10 DIAGNOSIS — Z71.82 EXERCISE COUNSELING: ICD-10-CM

## 2022-11-10 DIAGNOSIS — Z98.84 S/P LAPAROSCOPIC SLEEVE GASTRECTOMY: ICD-10-CM

## 2022-11-10 PROCEDURE — 1123F ACP DISCUSS/DSCN MKR DOCD: CPT | Performed by: SURGERY

## 2022-11-10 PROCEDURE — 3078F DIAST BP <80 MM HG: CPT | Performed by: SURGERY

## 2022-11-10 PROCEDURE — 99213 OFFICE O/P EST LOW 20 MIN: CPT | Performed by: SURGERY

## 2022-11-10 PROCEDURE — APPSS30 APP SPLIT SHARED TIME 16-30 MINUTES: Performed by: PHYSICIAN ASSISTANT

## 2022-11-10 PROCEDURE — 3074F SYST BP LT 130 MM HG: CPT | Performed by: SURGERY

## 2023-06-05 ENCOUNTER — HOSPITAL ENCOUNTER (OUTPATIENT)
Dept: NUCLEAR MEDICINE | Age: 68
Discharge: HOME | End: 2023-06-08
Payer: MEDICARE

## 2023-06-05 DIAGNOSIS — R10.13 ABDOMINAL PAIN, EPIGASTRIC: ICD-10-CM

## 2023-06-05 DIAGNOSIS — R11.10 VOMITING, UNSPECIFIED VOMITING TYPE, UNSPECIFIED WHETHER NAUSEA PRESENT: ICD-10-CM

## 2023-06-05 PROCEDURE — 3430000000 HC RX DIAGNOSTIC RADIOPHARMACEUTICAL: Performed by: NURSE PRACTITIONER

## 2023-06-05 PROCEDURE — A9541 TC99M SULFUR COLLOID: HCPCS | Performed by: NURSE PRACTITIONER

## 2023-06-05 PROCEDURE — 78264 GASTRIC EMPTYING IMG STUDY: CPT

## 2023-06-05 RX ADMIN — Medication 1 MILLICURIE: at 07:58

## 2023-12-11 NOTE — PROGRESS NOTES
aspirin    2. Bowel prep    3. Colonoscopy with MAC. I went through the risks of bleeding, perforation of the colon and cardiopulmonary problems that can develop with the use of anesthesia.     Magdi Kathleen MD    [facility]   12/14/2023  6:27 PM

## 2023-12-14 ENCOUNTER — OFFICE VISIT (OUTPATIENT)
Dept: SURGERY | Age: 68
End: 2023-12-14
Payer: MEDICARE

## 2023-12-14 VITALS
BODY MASS INDEX: 34.6 KG/M2 | HEIGHT: 62 IN | WEIGHT: 188 LBS | DIASTOLIC BLOOD PRESSURE: 74 MMHG | HEART RATE: 61 BPM | SYSTOLIC BLOOD PRESSURE: 148 MMHG

## 2023-12-14 DIAGNOSIS — E66.01 MORBID OBESITY (HCC): Primary | ICD-10-CM

## 2023-12-14 DIAGNOSIS — Z98.84 S/P LAPAROSCOPIC SLEEVE GASTRECTOMY: ICD-10-CM

## 2023-12-14 DIAGNOSIS — Z71.3 DIETARY COUNSELING: ICD-10-CM

## 2023-12-14 DIAGNOSIS — Z71.82 EXERCISE COUNSELING: ICD-10-CM

## 2023-12-14 DIAGNOSIS — E66.9 OBESITY, CLASS I, BMI 30-34.9: ICD-10-CM

## 2023-12-14 DIAGNOSIS — L98.7 EXCESS SKIN: ICD-10-CM

## 2023-12-14 PROCEDURE — 1036F TOBACCO NON-USER: CPT | Performed by: SURGERY

## 2023-12-14 PROCEDURE — 3077F SYST BP >= 140 MM HG: CPT | Performed by: SURGERY

## 2023-12-14 PROCEDURE — 1123F ACP DISCUSS/DSCN MKR DOCD: CPT | Performed by: SURGERY

## 2023-12-14 PROCEDURE — 3017F COLORECTAL CA SCREEN DOC REV: CPT | Performed by: SURGERY

## 2023-12-14 PROCEDURE — 99213 OFFICE O/P EST LOW 20 MIN: CPT | Performed by: SURGERY

## 2023-12-14 PROCEDURE — 3078F DIAST BP <80 MM HG: CPT | Performed by: SURGERY

## 2023-12-22 ENCOUNTER — PREP FOR PROCEDURE (OUTPATIENT)
Dept: SURGERY | Age: 68
End: 2023-12-22

## 2023-12-22 DIAGNOSIS — Z12.11 ENCOUNTER FOR SCREENING COLONOSCOPY: ICD-10-CM

## 2023-12-22 DIAGNOSIS — Z98.84 S/P LAPAROSCOPIC SLEEVE GASTRECTOMY: ICD-10-CM

## 2023-12-22 DIAGNOSIS — Z86.010 PERSONAL HISTORY OF COLONIC POLYPS: ICD-10-CM

## 2023-12-22 DIAGNOSIS — E66.01 MORBID OBESITY (HCC): ICD-10-CM

## 2023-12-22 DIAGNOSIS — E66.9 OBESITY, CLASS I, BMI 30-34.9: ICD-10-CM

## 2023-12-22 PROBLEM — E66.811 OBESITY, CLASS I, BMI 30-34.9: Status: ACTIVE | Noted: 2023-12-22

## 2023-12-22 PROBLEM — Z86.0100 PERSONAL HISTORY OF COLONIC POLYPS: Status: ACTIVE | Noted: 2023-12-22

## 2024-01-21 PROBLEM — Z12.11 ENCOUNTER FOR SCREENING COLONOSCOPY: Status: RESOLVED | Noted: 2023-12-22 | Resolved: 2024-01-21

## 2024-01-29 ENCOUNTER — TELEPHONE (OUTPATIENT)
Dept: SURGERY | Age: 69
End: 2024-01-29

## 2024-01-29 RX ORDER — SOD SULF/POT CHLORIDE/MAG SULF 1.479 G
TABLET ORAL
Qty: 24 TABLET | Refills: 0 | Status: SHIPPED | OUTPATIENT
Start: 2024-01-29

## 2024-01-29 NOTE — PERIOP NOTE
Patient verified name, , and procedure.    Type: 1a; abbreviated assessment per anesthesia guidelines    Labs per anesthesia: none    Instructed pt that they will be notified the day before their procedure by the GI Lab for time of arrival if their procedure is [place] and Pre-op for [place] cases. Arrival times should be called by 5 pm. If no phone is received the patient should contact their respective hospital. The GI lab telephone number is [phone] and ES Pre-op is [phone].     Follow diet and prep instructions per office including NPO status.  If patient has NOT received instructions from office patient is advised to call surgeon office, verbalizes understanding.    Bath or shower the night before and the am of surgery with non-mositurizing soap. No lotions, oils, powders, cologne on skin. No make up, eye make up or jewelry. Wear loose fitting comfortable, clean clothing.     Must have adult present in building the entire time .     Medications for the day of procedure Atenolol.    The following discharge instructions reviewed with patient: medication given during procedure may cause drowsiness for several hours, therefore, do not drive or operate machinery for remainder of the day. You may not drink alcohol on the day of your procedure, please resume regular diet and activity unless otherwise directed. You may experience abdominal distention for several hours that is relieved by the passage of gas. Contact your physician if you have any of the following: fever or chills, severe abdominal pain or excessive amount of bleeding or a large amount when having a bowel movement. Occasional specks of blood with bowel movement would not be unusual.

## 2024-01-29 NOTE — TELEPHONE ENCOUNTER
Called Alexandru regarding [employer]. Provided Pharmacist, eze Bowen/ [employer] discount card information; pending discount activation via the patient.  Called patient regarding [employer] & the discount activation; unable to L/V/M (no VM set up).

## 2024-01-30 ENCOUNTER — TELEPHONE (OUTPATIENT)
Dept: SURGERY | Age: 69
End: 2024-01-30

## 2024-01-30 PROBLEM — Z12.11 ENCOUNTER FOR SCREENING COLONOSCOPY: Status: ACTIVE | Noted: 2023-12-22

## 2024-01-30 NOTE — TELEPHONE ENCOUNTER
Patient called regarding [employer] discount instructions & phone #. Provided patient w/ necessary information. Patient stated that she would call back to verify after activating [employer] discount card.    Called [facility] pharmacy & spoke w/ Frank; confirmed patient pick-up.    Tried to call patient; unable to L/V/M.

## 2024-01-30 NOTE — TELEPHONE ENCOUNTER
Spoke w/ patient regarding [employer]; informed patient that she will be able to  prescription this afternoon. Also, reminded patient that she needs to call and activate the [employer] discount in order for it to be applied. Patient verbalized understanding.

## 2024-01-30 NOTE — PROGRESS NOTES
RN called Pt to confirm appointment time of 1108, arrival time 0930, location,  requirement, and instructions for registration at the hospital.  Pt verbalized understanding.

## 2024-01-31 ENCOUNTER — HOSPITAL ENCOUNTER (OUTPATIENT)
Age: 69
Setting detail: OUTPATIENT SURGERY
Discharge: HOME | End: 2024-01-31
Attending: SURGERY | Admitting: SURGERY
Payer: MEDICARE

## 2024-01-31 ENCOUNTER — ANESTHESIA EVENT (OUTPATIENT)
Dept: ENDOSCOPY | Age: 69
End: 2024-01-31
Payer: MEDICARE

## 2024-01-31 ENCOUNTER — ANESTHESIA (OUTPATIENT)
Dept: ENDOSCOPY | Age: 69
End: 2024-01-31
Payer: MEDICARE

## 2024-01-31 VITALS
TEMPERATURE: 98 F | RESPIRATION RATE: 18 BRPM | SYSTOLIC BLOOD PRESSURE: 150 MMHG | HEART RATE: 70 BPM | OXYGEN SATURATION: 100 % | DIASTOLIC BLOOD PRESSURE: 68 MMHG | HEIGHT: 62 IN | WEIGHT: 188 LBS | BODY MASS INDEX: 34.6 KG/M2

## 2024-01-31 LAB
GLUCOSE BLD STRIP.AUTO-MCNC: 93 MG/DL (ref 65–100)
POTASSIUM BLD-SCNC: 3.5 MMOL/L (ref 3.5–5.1)
SERVICE CMNT-IMP: NORMAL

## 2024-01-31 PROCEDURE — 84132 ASSAY OF SERUM POTASSIUM: CPT

## 2024-01-31 PROCEDURE — 6360000002 HC RX W HCPCS: Performed by: NURSE ANESTHETIST, CERTIFIED REGISTERED

## 2024-01-31 PROCEDURE — 2580000003 HC RX 258: Performed by: ANESTHESIOLOGY

## 2024-01-31 PROCEDURE — 3700000001 HC ADD 15 MINUTES (ANESTHESIA): Performed by: SURGERY

## 2024-01-31 PROCEDURE — 7100000011 HC PHASE II RECOVERY - ADDTL 15 MIN: Performed by: SURGERY

## 2024-01-31 PROCEDURE — 3609027000 HC COLONOSCOPY: Performed by: SURGERY

## 2024-01-31 PROCEDURE — 2709999900 HC NON-CHARGEABLE SUPPLY: Performed by: SURGERY

## 2024-01-31 PROCEDURE — 7100000010 HC PHASE II RECOVERY - FIRST 15 MIN: Performed by: SURGERY

## 2024-01-31 PROCEDURE — 82962 GLUCOSE BLOOD TEST: CPT

## 2024-01-31 PROCEDURE — 3700000000 HC ANESTHESIA ATTENDED CARE: Performed by: SURGERY

## 2024-01-31 RX ORDER — PROPOFOL 10 MG/ML
INJECTION, EMULSION INTRAVENOUS PRN
Status: DISCONTINUED | OUTPATIENT
Start: 2024-01-31 | End: 2024-01-31 | Stop reason: SDUPTHER

## 2024-01-31 RX ORDER — SODIUM CHLORIDE, SODIUM LACTATE, POTASSIUM CHLORIDE, CALCIUM CHLORIDE 600; 310; 30; 20 MG/100ML; MG/100ML; MG/100ML; MG/100ML
INJECTION, SOLUTION INTRAVENOUS CONTINUOUS
Status: DISCONTINUED | OUTPATIENT
Start: 2024-01-31 | End: 2024-01-31 | Stop reason: HOSPADM

## 2024-01-31 RX ORDER — LIDOCAINE HYDROCHLORIDE 10 MG/ML
1 INJECTION, SOLUTION INFILTRATION; PERINEURAL
Status: DISCONTINUED | OUTPATIENT
Start: 2024-01-31 | End: 2024-01-31 | Stop reason: HOSPADM

## 2024-01-31 RX ORDER — SODIUM CHLORIDE 9 MG/ML
INJECTION, SOLUTION INTRAVENOUS PRN
Status: DISCONTINUED | OUTPATIENT
Start: 2024-01-31 | End: 2024-01-31 | Stop reason: HOSPADM

## 2024-01-31 RX ORDER — SODIUM CHLORIDE 0.9 % (FLUSH) 0.9 %
5-40 SYRINGE (ML) INJECTION EVERY 12 HOURS SCHEDULED
Status: DISCONTINUED | OUTPATIENT
Start: 2024-01-31 | End: 2024-01-31 | Stop reason: HOSPADM

## 2024-01-31 RX ORDER — SODIUM CHLORIDE 0.9 % (FLUSH) 0.9 %
5-40 SYRINGE (ML) INJECTION PRN
Status: DISCONTINUED | OUTPATIENT
Start: 2024-01-31 | End: 2024-01-31 | Stop reason: HOSPADM

## 2024-01-31 RX ADMIN — SODIUM CHLORIDE, POTASSIUM CHLORIDE, SODIUM LACTATE AND CALCIUM CHLORIDE: 600; 310; 30; 20 INJECTION, SOLUTION INTRAVENOUS at 09:18

## 2024-01-31 RX ADMIN — PROPOFOL 50 MG: 10 INJECTION, EMULSION INTRAVENOUS at 10:46

## 2024-01-31 RX ADMIN — PROPOFOL 140 MCG/KG/MIN: 10 INJECTION, EMULSION INTRAVENOUS at 10:47

## 2024-01-31 RX ADMIN — PROPOFOL 50 MG: 10 INJECTION, EMULSION INTRAVENOUS at 10:56

## 2024-01-31 ASSESSMENT — PAIN - FUNCTIONAL ASSESSMENT
PAIN_FUNCTIONAL_ASSESSMENT: 0-10
PAIN_FUNCTIONAL_ASSESSMENT: NONE - DENIES PAIN

## 2024-01-31 NOTE — INTERVAL H&P NOTE
Update History & Physical    The patient's History and Physical of 1/24/24 was reviewed with the patient and I examined the patient. There was no change. The surgical site was confirmed by the patient and me.     Plan: The risks, benefits, expected outcome, and alternative to the recommended procedure have been discussed with the patient. Patient understands and wants to proceed with the procedure.     Electronically signed by XIOMARA DE LA ROSA MD on 1/31/2024 at 9:37 AM

## 2024-01-31 NOTE — OP NOTE
[facility]  OPERATIVE REPORT    Name:  GRETA LEVY  MR#:  [id number]  :  1955  ACCOUNT #:  [id number]  DATE OF SERVICE:  2024    I know the patient from a vertical sleeve gastrectomy done on 2020.    PREOPERATIVE DIAGNOSIS:  Personal history of colon polyps.    POSTOPERATIVE DIAGNOSES:  1.  Poor prep.  2.  There was vegetable matter seen throughout the entire colon, also pan diverticular disease with extensive diverticular disease in the descending and sigmoid colon and diverticula seen throughout the entire colon, even in the cecum.  3.  No masses or polyps seen.    PROCEDURE PERFORMED:  Colonoscopy.    SURGEON:  Francisco Javier Grace MD    ASSISTANT:  None.    ANESTHESIA:  Monitored anesthesia care with Dr. Ramos and Carlos A Garcia, the nurse anesthetist.    COMPLICATIONS:  None.    SPECIMENS REMOVED:  None.    IMPLANTS:  None.    ESTIMATED BLOOD LOSS:  None.    DRAINS:  None.    HISTORY:  This is a 68-year-old female who I know from previous sleeve gastrectomy on 2020.  We followed her for the Bariatrics.  Ro Blevins referred her for a colonoscopy.  I saw the patient in my office and went through a bowel prep.  She was given the explicit instructions not have any vegetable matter for 5 days.  The patient underwent a bowel prep by her report on 2024, came into the [facility].  She was placed on the stretcher in the left lateral decubitus position.  A time-out was done identifying the patient, surgeon, procedure and her birth date of 1955.  When everyone in the room agreed, we began the procedure.  Monitored anesthesia care was done by Dr. Ramos and Carlos A Garcia, the nurse anesthetist.  Once the sedative effect had taken hold, an adult colonoscope was advanced from the anus to the cecum.  Overall, was very poor prep.  There was vegetable matter seen throughout the entire colon from the rectum all the way to the cecum, was quite

## 2024-01-31 NOTE — DISCHARGE INSTRUCTIONS
Gastrointestinal Colonoscopy/Flexible Sigmoidoscopy - Lower Exam Discharge Instructions  Call Dr. Grace at [phone] for any problems or questions.  Contact the doctor’s office for follow up appointment as directed  Medication may cause drowsiness for several hours, therefore, do not drive or operate machinery for remainder of the day.  No alcohol today.  Do not make any important decisions such signing legal paperwork.  Ordinarily, you may resume regular diet and activity after exam unless otherwise specified by your physician.  Because of air put into your colon during exam, you may experience some abdominal distension, relieved by the passage of gas, for several hours.  Contact your physician if you have any of the following:  Excessive amount of bleeding - large amount when having a bowel movement.  Occasional specks of blood with bowel movement would not be unusual.  Severe abdominal pain  Fever or Chills            Instructions given to Allyson Gray and other family members.

## 2024-01-31 NOTE — PROGRESS NOTES
Patient discharged. Passing flatus. No acute distress noted. Escorted to car, with family by Zaire PEERZ.

## 2024-01-31 NOTE — ANESTHESIA PRE PROCEDURE
Department of Anesthesiology  Preprocedure Note       Name:  Allyson Gray   Age:  68 y.o.  :  1955                                          MRN:  [id number]         Date:  2024      Surgeon: Surgeon(s):  Francisco Javier Grace MD    Procedure: Procedure(s):  COLORECTAL CANCER SCREENING, NOT HIGH RISK / BMI 34    Medications prior to admission:   Prior to Admission medications    Medication Sig Start Date End Date Taking? Authorizing Provider   Sodium Sulfate-Mag Sulfate-KCl (SUTAB) 0557-502-771 MG TABS ALL CLEAR LIQUIDS the DAY PRIOR TO PROCEDURE. TAKE DAY 1 - DOSE 1 @ 4:30pm and DAY 2 - DOSE 2 @ 8:30pm SAME DAY. 24   Eloise Lopez, APRN - NP   atenolol (TENORMIN) 100 MG tablet Take 1 tablet by mouth 2 times daily 20   Automatic Reconciliation, Ar   ergocalciferol (ERGOCALCIFEROL) 1.25 MG (79704 UT) capsule Take 1 capsule twice a week x 4 weeks, then once a week thereafter  Patient not taking: Reported on 2024   Automatic Reconciliation, Ar   furosemide (LASIX) 20 MG tablet Take 1 tablet by mouth daily as needed 20   Automatic Reconciliation, Ar   lisinopril-hydroCHLOROthiazide (PRINZIDE;ZESTORETIC) 10-12.5 MG per tablet Take 1 tablet by mouth 2 times daily 20   Automatic Reconciliation, Ar   meclizine (ANTIVERT) 25 MG tablet Take 1 tablet by mouth 3 times daily as needed 20   Automatic Reconciliation, Ar   meloxicam (MOBIC) 7.5 MG tablet Take 1 tablet by mouth daily 3/21/19   Automatic Reconciliation, Ar   metoclopramide (REGLAN) 5 MG tablet Take 1 tablet by mouth 4 times daily  Patient not taking: Reported on 2024   Automatic Reconciliation, Ar   omeprazole (PRILOSEC) 40 MG delayed release capsule Take 1 capsule by mouth daily 11/10/20   Automatic Reconciliation, Ar   ondansetron (ZOFRAN) 8 MG tablet Take 1 tablet by mouth every 8 hours as needed 11/10/20   Automatic Reconciliation, Ar   potassium chloride (KLOR-CON M) 20 MEQ extended release tablet Take 1

## 2024-02-29 PROBLEM — Z12.11 ENCOUNTER FOR SCREENING COLONOSCOPY: Status: RESOLVED | Noted: 2023-12-22 | Resolved: 2024-02-29

## 2024-11-18 ENCOUNTER — OFFICE VISIT (OUTPATIENT)
Dept: SURGERY | Age: 69
End: 2024-11-18

## 2024-11-18 VITALS
HEART RATE: 69 BPM | DIASTOLIC BLOOD PRESSURE: 77 MMHG | BODY MASS INDEX: 27.25 KG/M2 | WEIGHT: 149 LBS | SYSTOLIC BLOOD PRESSURE: 142 MMHG

## 2024-11-18 DIAGNOSIS — Z86.39 HISTORY OF MORBID OBESITY: ICD-10-CM

## 2024-11-18 DIAGNOSIS — Z13.21 ENCOUNTER FOR VITAMIN DEFICIENCY SCREENING: ICD-10-CM

## 2024-11-18 DIAGNOSIS — E66.01 MORBID OBESITY: Primary | ICD-10-CM

## 2024-11-18 DIAGNOSIS — E66.811 OBESITY, CLASS I, BMI 30-34.9: ICD-10-CM

## 2024-11-18 DIAGNOSIS — Z71.3 DIETARY COUNSELING: ICD-10-CM

## 2024-11-18 DIAGNOSIS — Z71.82 EXERCISE COUNSELING: ICD-10-CM

## 2024-11-18 DIAGNOSIS — E66.01 MORBID OBESITY: ICD-10-CM

## 2024-11-18 DIAGNOSIS — Z98.84 S/P LAPAROSCOPIC SLEEVE GASTRECTOMY: ICD-10-CM

## 2024-11-18 PROBLEM — E11.9 DIET-CONTROLLED DIABETES MELLITUS (HCC): Status: ACTIVE | Noted: 2017-10-31

## 2024-11-18 PROBLEM — I25.10 CORONARY ATHEROSCLEROSIS: Status: ACTIVE | Noted: 2023-06-15

## 2024-11-18 PROBLEM — N18.31 STAGE 3A CHRONIC KIDNEY DISEASE (HCC): Status: ACTIVE | Noted: 2024-08-30

## 2024-11-18 PROBLEM — Z90.3 S/P GASTRIC SLEEVE PROCEDURE: Status: ACTIVE | Noted: 2019-08-06

## 2024-11-18 PROBLEM — N18.4 CHRONIC RENAL DISEASE, STAGE IV (HCC): Status: ACTIVE | Noted: 2024-08-06

## 2024-11-18 PROBLEM — T80.92XA: Status: ACTIVE | Noted: 2023-06-15

## 2024-11-18 PROBLEM — R13.10 DYSPHAGIA, UNSPECIFIED: Status: ACTIVE | Noted: 2023-11-07

## 2024-11-18 PROBLEM — N28.9 RENAL IMPAIRMENT: Status: ACTIVE | Noted: 2023-06-15

## 2024-11-18 PROBLEM — H35.379 EPIRETINAL MEMBRANE: Status: ACTIVE | Noted: 2019-05-21

## 2024-11-18 PROBLEM — D64.9 ANEMIA: Status: ACTIVE | Noted: 2024-09-05

## 2024-11-18 PROBLEM — F34.1 DYSTHYMIA: Status: ACTIVE | Noted: 2017-10-31

## 2024-11-18 PROBLEM — E78.5 TYPE 2 DIABETES MELLITUS WITH HYPERLIPIDEMIA (HCC): Status: ACTIVE | Noted: 2020-08-06

## 2024-11-18 PROBLEM — M43.10 DEGENERATIVE SPONDYLOLISTHESIS: Status: ACTIVE | Noted: 2023-11-02

## 2024-11-18 PROBLEM — E78.5 HYPERLIPIDEMIA: Status: ACTIVE | Noted: 2023-06-15

## 2024-11-18 PROBLEM — K21.9 GASTROESOPHAGEAL REFLUX DISEASE WITHOUT ESOPHAGITIS: Status: ACTIVE | Noted: 2021-03-30

## 2024-11-18 PROBLEM — F32.A MILD DEPRESSION: Status: ACTIVE | Noted: 2018-11-07

## 2024-11-18 PROBLEM — E11.69 TYPE 2 DIABETES MELLITUS WITH HYPERLIPIDEMIA (HCC): Status: ACTIVE | Noted: 2020-08-06

## 2024-11-18 PROBLEM — E78.5 DYSLIPIDEMIA: Status: ACTIVE | Noted: 2020-11-12

## 2024-11-18 LAB
25(OH)D3 SERPL-MCNC: 37 NG/ML (ref 30–100)
ALBUMIN SERPL-MCNC: 3.7 G/DL (ref 3.2–4.6)
ALBUMIN/GLOB SERPL: 1.1 (ref 1–1.9)
ALP SERPL-CCNC: 80 U/L (ref 35–104)
ALT SERPL-CCNC: 6 U/L (ref 8–45)
ANION GAP SERPL CALC-SCNC: 10 MMOL/L (ref 7–16)
AST SERPL-CCNC: 18 U/L (ref 15–37)
BASOPHILS # BLD: 0 K/UL (ref 0–0.2)
BASOPHILS NFR BLD: 0 % (ref 0–2)
BILIRUB SERPL-MCNC: 0.4 MG/DL (ref 0–1.2)
BUN SERPL-MCNC: 23 MG/DL (ref 8–23)
CALCIUM SERPL-MCNC: 9.5 MG/DL (ref 8.8–10.2)
CHLORIDE SERPL-SCNC: 102 MMOL/L (ref 98–107)
CO2 SERPL-SCNC: 27 MMOL/L (ref 20–29)
CREAT SERPL-MCNC: 1.18 MG/DL (ref 0.6–1.1)
DIFFERENTIAL METHOD BLD: ABNORMAL
EOSINOPHIL # BLD: 0 K/UL (ref 0–0.8)
EOSINOPHIL NFR BLD: 1 % (ref 0.5–7.8)
ERYTHROCYTE [DISTWIDTH] IN BLOOD BY AUTOMATED COUNT: 13.8 % (ref 11.9–14.6)
FERRITIN SERPL-MCNC: 211 NG/ML (ref 8–388)
FOLATE SERPL-MCNC: 4.5 NG/ML (ref 3.1–17.5)
GLOBULIN SER CALC-MCNC: 3.4 G/DL (ref 2.3–3.5)
GLUCOSE SERPL-MCNC: 78 MG/DL (ref 70–99)
HCT VFR BLD AUTO: 33.3 % (ref 35.8–46.3)
HGB BLD-MCNC: 10.4 G/DL (ref 11.7–15.4)
IMM GRANULOCYTES # BLD AUTO: 0 K/UL (ref 0–0.5)
IMM GRANULOCYTES NFR BLD AUTO: 0 % (ref 0–5)
IRON SERPL-MCNC: 69 UG/DL (ref 35–100)
LYMPHOCYTES # BLD: 1.3 K/UL (ref 0.5–4.6)
LYMPHOCYTES NFR BLD: 50 % (ref 13–44)
MAGNESIUM SERPL-MCNC: 2.1 MG/DL (ref 1.8–2.4)
MCH RBC QN AUTO: 28.6 PG (ref 26.1–32.9)
MCHC RBC AUTO-ENTMCNC: 31.2 G/DL (ref 31.4–35)
MCV RBC AUTO: 91.5 FL (ref 82–102)
MONOCYTES # BLD: 0.3 K/UL (ref 0.1–1.3)
MONOCYTES NFR BLD: 11 % (ref 4–12)
NEUTS SEG # BLD: 1 K/UL (ref 1.7–8.2)
NEUTS SEG NFR BLD: 38 % (ref 43–78)
NRBC # BLD: 0 K/UL (ref 0–0.2)
PLATELET # BLD AUTO: 195 K/UL (ref 150–450)
PMV BLD AUTO: 11.3 FL (ref 9.4–12.3)
POTASSIUM SERPL-SCNC: 3.7 MMOL/L (ref 3.5–5.1)
PROT SERPL-MCNC: 7 G/DL (ref 6.3–8.2)
RBC # BLD AUTO: 3.64 M/UL (ref 4.05–5.2)
SODIUM SERPL-SCNC: 139 MMOL/L (ref 136–145)
TSH, 3RD GENERATION: 0.74 UIU/ML (ref 0.27–4.2)
VIT B12 SERPL-MCNC: 376 PG/ML (ref 193–986)
WBC # BLD AUTO: 2.7 K/UL (ref 4.3–11.1)

## 2024-11-18 PROCEDURE — APPSS30 APP SPLIT SHARED TIME 16-30 MINUTES: Performed by: PHYSICIAN ASSISTANT

## 2024-11-18 RX ORDER — FOLIC ACID 1 MG/1
1000 TABLET ORAL DAILY
COMMUNITY

## 2024-11-18 RX ORDER — ALLOPURINOL 100 MG/1
100 TABLET ORAL DAILY
COMMUNITY
Start: 2024-05-30

## 2024-11-18 RX ORDER — EZETIMIBE 10 MG/1
10 TABLET ORAL DAILY
COMMUNITY
Start: 2024-05-30 | End: 2025-05-30

## 2024-11-18 RX ORDER — SEMAGLUTIDE 2.68 MG/ML
INJECTION, SOLUTION SUBCUTANEOUS
COMMUNITY

## 2024-11-18 NOTE — PROGRESS NOTES
Francisco Javier Grace MD   Bariatric & Advanced Laparoscopic Surgery & Endoscopy  [address], [address]  [place] [address]  Phone [phone]   Fax [phone]    Patient: Allyson Gray MRN: [id number]     YOB: 1955  Age: 69 y.o.  Sex: female      PCP: Ro Blevins APRN - TERRY   Date:  11/18/2024      4 years post-op visit after a sleeve gastrectomy was done on 11/17/2020.   She has lost 39 lbs since her last office visit.    Weight History Graph    Surgeon: Sanjay   DOS: 11/17/2020   Procedure: Sleeve   Pre-op weight: 239   Ideal body weight: 145   Excess body weight: 94      Last Surgical Weight Loss:      11/9/2022     9:18 PM 11/10/2022     3:51 PM 12/14/2023     4:02 PM 11/18/2024     3:05 PM   Surgical Weight Loss Tracker   Ideal Body Weight 145 lb      Surgery Date 11/17/2020      Pre-Surgical Height 5' 4\"      Pre-Surgical Weight 239 lb      Pre Surgery BMI 41.02      Weight to Lose 94 lb      Date  11/10/2022 12/14/2023 11/18/2024   Height  5' 2\" 5' 2\" 5' 2\"   Weight  168 lb 188 lb 149 lb   BMI  30.72 34.38 27.25   Weight Change  -71 lb 20 lb -39 lb   Total Weight Change  -71 lb -51 lb -90 lb   % EBWL  76% 54% 96%        Evaluation of pre-operative co-morbid conditions:     Diabetes RS   Hypertension RS   Hyperlipidemia RS     Clinical Assessment and Physical Exam:    Doing well 4 years post op. She reports that she has been compliant with diet and is exercising via walking daily. She admits to epigastric pain with eating, vomiting with over eating, and dysphagia with certain foods. She is scheduled to [facility] for repeat EGD and further evaluation.     Protein:  50-60 gms/day  Fluids:    40-50 ounces/day  Exercise:  walking 7x per week for 60 minutes  Admits to abdominal pain. No nausea. Admits to vomiting.   No fever or chills.   Denies reflux. Admits to dysphagia.  Regular bowel movements.   Tolerating protein first diet without

## 2024-11-18 NOTE — PROGRESS NOTES
[facility] NUTRITION REASSESSMENT  Assessment:   4 years post-op VSG. Pt consuming ~60g protein/d and ~40-50oz fluid/d. Pt is eating at least 3x/d. Pt is drinking water and green tea. Pt is exercising via walking daily. Pt is taking MVI and Ca supplements as recommended.    Diet Recall:   Breakfast: Egg sandwich   Lunch: Egg sandwich   Dinner: Salad     Intervention:   Evaluated diet recall.  Encouraged continued and increased activity.      Encouraged strength training for maintenance     Monitoring and Evaluation:  Monitor for continued safe, supervised weight loss for VSG.   F/U per MD Maddie Nina MBA. RD, LD

## 2024-11-21 LAB — VIT B6 SERPL-MCNC: 3.8 UG/L (ref 3.4–65.2)

## 2024-11-22 LAB
NIACIN SERPL-MCNC: <5 NG/ML (ref 0–5)
NICOTINAMIDE SERPL-MCNC: 12.8 NG/ML (ref 5.2–72.1)

## 2024-11-23 LAB — VIT B1 BLD-SCNC: 50.1 NMOL/L (ref 66.5–200)

## 2024-12-10 DIAGNOSIS — E51.9 THIAMINE DEFICIENCY: Primary | ICD-10-CM

## 2025-04-14 ENCOUNTER — TELEPHONE (OUTPATIENT)
Age: 70
End: 2025-04-14

## 2025-04-14 NOTE — TELEPHONE ENCOUNTER
Called and advised patient has not been seen since 2020 and would need to reestablish care in order to address risk assessment. She is going to speak with patient and have her call if she wishes to schedule with our office.

## 2025-04-14 NOTE — TELEPHONE ENCOUNTER
Cardiac Clearance        Physician or Practice Requesting:Weight management   : Nursing Triage   Contact Phone Number: [phone]  Fax Number: [phone]  Date of Surgery/Procedure: TBD  Type of Surgery or Procedure: Bariatric Surgery   Type of Anesthesia: General   Type of Clearance Requested: risk assessment and any medication hold   Medication to Hold:?  Days to Hold: ?

## 2025-07-23 NOTE — PROGRESS NOTES
[facility] CARDIOLOGY  [address] [place] [address], [address]  [place] [address]  PHONE: [phone]      25    NAME:  Allyson Gray  : 1955  MRN: [id number]         SUBJECTIVE:   Allyson Gray is a 70 y.o. female seen for a consultation visit regarding the following:     Chief Complaint   Patient presents with    New Patient    Cardiac Clearance    Hypertension    Hyperlipidemia            HPI:  Consultation is requested by the patient for evaluation of New Patient, Cardiac Clearance, Hypertension, and Hyperlipidemia   .    Patient requests evaluation. She was last seen in  for perioperative assessment prior to bariatric procedure, hypertensive urgency in setting of decongestant use and acute sinusitis.  Her weight is down 100 lbs since she was last here.     She returns to request perioperative risk assessment prior to repair of a paraesophageal hernia.  She is exercising regularly without symptoms.  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            Past cardiac history:    - Stress MPI, hypertensive response, breast attenuation, no ischemia, normal LV   2017 - normal echocardiogram   2019- normal resting and exercise ARIAS's with diffuse mild atherosclerosis             Cardiac Medications       Antihypertensive Combinations       lisinopril-hydroCHLOROthiazide (PRINZIDE;ZESTORETIC) 10-12.5 MG per tablet Take 1 tablet by mouth 2 times daily       Beta Blockers Cardio-Selective       atenolol (TENORMIN) 100 MG tablet Take 1 tablet by mouth 2 times daily       Loop Diuretics       furosemide (LASIX) 20 MG tablet Take 1 tablet by mouth daily as needed       HMG CoA Reductase Inhibitors       pravastatin (PRAVACHOL) 80 MG tablet Take 1 tablet by mouth       Intestinal Cholesterol Absorption Inhibitors       ezetimibe (ZETIA) 10 MG tablet Take 1 tablet by mouth daily          Diabetic Medications       Incretin Mimetic Agents       OZEMPIC, 2 MG/DOSE,

## 2025-07-24 ENCOUNTER — OFFICE VISIT (OUTPATIENT)
Age: 70
End: 2025-07-24
Payer: MEDICARE

## 2025-07-24 VITALS
HEART RATE: 61 BPM | BODY MASS INDEX: 28.34 KG/M2 | SYSTOLIC BLOOD PRESSURE: 130 MMHG | WEIGHT: 154 LBS | DIASTOLIC BLOOD PRESSURE: 72 MMHG | HEIGHT: 62 IN

## 2025-07-24 DIAGNOSIS — I10 PRIMARY HYPERTENSION: ICD-10-CM

## 2025-07-24 DIAGNOSIS — Z01.818 PRE-OP EVALUATION: Primary | ICD-10-CM

## 2025-07-24 PROCEDURE — 1160F RVW MEDS BY RX/DR IN RCRD: CPT | Performed by: INTERNAL MEDICINE

## 2025-07-24 PROCEDURE — 3075F SYST BP GE 130 - 139MM HG: CPT | Performed by: INTERNAL MEDICINE

## 2025-07-24 PROCEDURE — 3017F COLORECTAL CA SCREEN DOC REV: CPT | Performed by: INTERNAL MEDICINE

## 2025-07-24 PROCEDURE — 1123F ACP DISCUSS/DSCN MKR DOCD: CPT | Performed by: INTERNAL MEDICINE

## 2025-07-24 PROCEDURE — 1159F MED LIST DOCD IN RCRD: CPT | Performed by: INTERNAL MEDICINE

## 2025-07-24 PROCEDURE — 1036F TOBACCO NON-USER: CPT | Performed by: INTERNAL MEDICINE

## 2025-07-24 PROCEDURE — 1090F PRES/ABSN URINE INCON ASSESS: CPT | Performed by: INTERNAL MEDICINE

## 2025-07-24 PROCEDURE — 99204 OFFICE O/P NEW MOD 45 MIN: CPT | Performed by: INTERNAL MEDICINE

## 2025-07-24 PROCEDURE — G8400 PT W/DXA NO RESULTS DOC: HCPCS | Performed by: INTERNAL MEDICINE

## 2025-07-24 PROCEDURE — 93000 ELECTROCARDIOGRAM COMPLETE: CPT | Performed by: INTERNAL MEDICINE

## 2025-07-24 PROCEDURE — G8427 DOCREV CUR MEDS BY ELIG CLIN: HCPCS | Performed by: INTERNAL MEDICINE

## 2025-07-24 PROCEDURE — G8417 CALC BMI ABV UP PARAM F/U: HCPCS | Performed by: INTERNAL MEDICINE

## 2025-07-24 PROCEDURE — 3078F DIAST BP <80 MM HG: CPT | Performed by: INTERNAL MEDICINE

## 2025-07-24 ASSESSMENT — ENCOUNTER SYMPTOMS: SHORTNESS OF BREATH: 0

## (undated) DEVICE — APPLICATOR LAP 35 CM 2 RIGID VISTASEAL

## (undated) DEVICE — ENDOSCOPIC KIT 1.1+ OP4 CA DE 2 GWN AAMI LEVEL 3

## (undated) DEVICE — SINGLE BASIN: Brand: CARDINAL HEALTH

## (undated) DEVICE — YANKAUER,BULB TIP,W/O VENT,RIGID,STERILE: Brand: MEDLINE

## (undated) DEVICE — SYRINGE MEDICAL 3ML CLEAR PLASTIC STANDARD NON CONTROL LUERLOCK TIP DISPOSABLE

## (undated) DEVICE — CANNULA NSL ORAL AD FOR CAPNOFLEX CO2 O2 AIRLFE

## (undated) DEVICE — RELOAD STPL L60MM H1.5-3.6MM REG TISS BLU GRIPPING SURF B

## (undated) DEVICE — KENDALL RADIOLUCENT FOAM MONITORING ELECTRODE RECTANGULAR SHAPE: Brand: KENDALL

## (undated) DEVICE — BLOCK BITE AD 60FR W/ VELC STRP ADDRESSES MOST PT AND

## (undated) DEVICE — GAUZE,SPONGE,4"X4",12PLY,WOVEN,NS,LF: Brand: MEDLINE

## (undated) DEVICE — 2, DISPOSABLE SUCTION/IRRIGATOR WITHOUT DISPOSABLE TIP: Brand: STRYKEFLOW

## (undated) DEVICE — TROCAR: Brand: KII FIOS FIRST ENTRY

## (undated) DEVICE — RETRIEVER ENDOSCP L230CM DIA2.5MM NET W3XL5.5CM MIN WRK CHN

## (undated) DEVICE — TROCAR: Brand: KII® SLEEVE

## (undated) DEVICE — CONNECTOR TBNG OD5-7MM O2 END DISP

## (undated) DEVICE — POUCH SPEC RETRV SHFT 15MM 13X23CM GRN POLYUR DBL RNG HNDL

## (undated) DEVICE — STAPLER SKIN L440MM 32MM LNG 12 FIRING B FRM PWR + GRIPPING

## (undated) DEVICE — DERMABOND SKIN ADH 0.7ML -- DERMABOND ADVANCED 12/BX

## (undated) DEVICE — BLLN KT O RING ENDOSCP US --

## (undated) DEVICE — GLOVE SURG SZ 75 CRM LTX FREE POLYISOPRENE POLYMER BEAD ANTI

## (undated) DEVICE — REM POLYHESIVE ADULT PATIENT RETURN ELECTRODE: Brand: VALLEYLAB

## (undated) DEVICE — (D)PREP SKN CHLRAPRP APPL 26ML -- CONVERT TO ITEM 371833

## (undated) DEVICE — NEEDLE HYPO 21GA L1.5IN INTRAMUSCULAR S STL LATCH BVL UP

## (undated) DEVICE — SYR 3ML LL TIP 1/10ML GRAD --

## (undated) DEVICE — TUBING INSUFFLATION SMK EVAC HI FLO SET PNEUMOCLEAR

## (undated) DEVICE — SINGLE PORT MANIFOLD: Brand: NEPTUNE 2

## (undated) DEVICE — APPLIER CLP L SHFT DIA12MM 20 ROT MULT LIGACLP

## (undated) DEVICE — SUT ETHBND 0 30IN SH GRN --

## (undated) DEVICE — NDL PRT INJ NSAF BLNT 18GX1.5 --

## (undated) DEVICE — SEALER TISS L45CM DIA5MM ARTC ADV BPLR STR TIP LAP APPRCH

## (undated) DEVICE — MOUTHPIECE ENDOSCP 20X27MM --

## (undated) DEVICE — TROCAR: Brand: KII® OPTICAL ACCESS SYSTEM

## (undated) DEVICE — SYR 5ML 1/5 GRAD LL NSAF LF --

## (undated) DEVICE — LAPAROSCOPIC TROCAR SLEEVE/SINGLE USE: Brand: KII® OPTICAL ACCESS SYSTEM

## (undated) DEVICE — SEALANT TISS 4 CC FIBRIN VISTASEAL

## (undated) DEVICE — AIRLIFE™ OXYGEN TUBING 7 FEET (2.1 M) CRUSH RESISTANT OXYGEN TUBING, VINYL TIPPED: Brand: AIRLIFE™

## (undated) DEVICE — GASTRIC SLEEVE: Brand: MEDLINE INDUSTRIES, INC.

## (undated) DEVICE — CONTAINER SPEC HISTOLOGY 900ML POLYPR

## (undated) DEVICE — VISIGI 3D®  CALIBRATION SYSTEM  SIZE 40FR SLEEVE/STD: Brand: BOEHRINGER® VISIGI™ 3D SLEEVE GASTRECTOMY CALIBRATION SYSTEM, SIZE 40FR

## (undated) DEVICE — SUTURE MCRYL SZ 4-0 L27IN ABSRB UD L19MM PS-2 1/2 CIR PRIM Y426H

## (undated) DEVICE — BUTTON SWITCH PENCIL BLADE ELECTRODE, HOLSTER: Brand: EDGE

## (undated) DEVICE — SYR LR LCK 1ML GRAD NSAF 30ML --

## (undated) DEVICE — SYRINGE MED 10ML LUERLOCK TIP W/O SFTY DISP

## (undated) DEVICE — DRAPE,TOP,102X53,STERILE: Brand: MEDLINE

## (undated) DEVICE — CARDINAL HEALTH FLEXIBLE LIGHT HANDLE COVER: Brand: CARDINAL HEALTH

## (undated) DEVICE — SYRINGE, LUER SLIP, STERILE, 60ML: Brand: MEDLINE

## (undated) DEVICE — GARMENT,MEDLINE,DVT,INT,CALF,LG, GEN2: Brand: MEDLINE

## (undated) DEVICE — TROCAR ENDOSCP L100MM DIA15MM BLDELSS STBL SL ENDOPATH XCEL

## (undated) DEVICE — 2000CC GUARDIAN II: Brand: GUARDIAN

## (undated) DEVICE — SOLUTION IV 1000ML 0.9% SOD CHL

## (undated) DEVICE — DUETTE, MULTI-BAND MUCOSECTOMY: Brand: DUETTE